# Patient Record
Sex: MALE | Race: WHITE | NOT HISPANIC OR LATINO | Employment: OTHER | URBAN - METROPOLITAN AREA
[De-identification: names, ages, dates, MRNs, and addresses within clinical notes are randomized per-mention and may not be internally consistent; named-entity substitution may affect disease eponyms.]

---

## 2017-08-17 DIAGNOSIS — E03.9 HYPOTHYROIDISM, UNSPECIFIED TYPE: ICD-10-CM

## 2017-08-17 NOTE — TELEPHONE ENCOUNTER
LEVOTHYROXINE     Last Written Prescription Date: 10/25/16  Last Quantity: 90, # refills: 3  Last Office Visit with G, P or [facility] prescribing provider: 10/25/16        TSH   Date Value Ref Range Status   11/11/2016 2.12 0.40 - 4.00 mU/L Final

## 2017-08-22 RX ORDER — LEVOTHYROXINE SODIUM 88 UG/1
88 TABLET ORAL DAILY
Qty: 90 TABLET | Refills: 0 | Status: SHIPPED | OUTPATIENT
Start: 2017-08-22 | End: 2017-11-15

## 2017-10-05 ENCOUNTER — HOSPITAL ENCOUNTER (OUTPATIENT)
Dept: GENERAL RADIOLOGY | Facility: CLINIC | Age: 52
Discharge: HOME | End: 2017-10-05
Attending: FAMILY MEDICINE | Admitting: FAMILY MEDICINE
Payer: COMMERCIAL

## 2017-10-05 ENCOUNTER — OFFICE VISIT (OUTPATIENT)
Dept: FAMILY MEDICINE | Facility: CLINIC | Age: 52
End: 2017-10-05
Payer: COMMERCIAL

## 2017-10-05 VITALS
OXYGEN SATURATION: 98 % | SYSTOLIC BLOOD PRESSURE: 128 MMHG | HEART RATE: 99 BPM | BODY MASS INDEX: 29.85 KG/M2 | DIASTOLIC BLOOD PRESSURE: 60 MMHG | WEIGHT: 218.6 LBS | TEMPERATURE: 96 F

## 2017-10-05 DIAGNOSIS — E03.9 HYPOTHYROIDISM, UNSPECIFIED TYPE: ICD-10-CM

## 2017-10-05 DIAGNOSIS — R07.1 CHEST PAIN ON BREATHING: ICD-10-CM

## 2017-10-05 DIAGNOSIS — J20.9 ACUTE BRONCHITIS, UNSPECIFIED ORGANISM: Primary | ICD-10-CM

## 2017-10-05 LAB
BASOPHILS # BLD AUTO: 0 10E9/L (ref 0–0.2)
BASOPHILS NFR BLD AUTO: 0.5 %
DIFFERENTIAL METHOD BLD: NORMAL
EOSINOPHIL # BLD AUTO: 0.2 10E9/L (ref 0–0.7)
EOSINOPHIL NFR BLD AUTO: 2.7 %
ERYTHROCYTE [DISTWIDTH] IN BLOOD BY AUTOMATED COUNT: 12.7 % (ref 10–15)
HCT VFR BLD AUTO: 49.5 % (ref 40–53)
HGB BLD-MCNC: 16.2 G/DL (ref 13.3–17.7)
IMM GRANULOCYTES # BLD: 0 10E9/L (ref 0–0.4)
IMM GRANULOCYTES NFR BLD: 0.5 %
LYMPHOCYTES # BLD AUTO: 2 10E9/L (ref 0.8–5.3)
LYMPHOCYTES NFR BLD AUTO: 26.5 %
MCH RBC QN AUTO: 31.3 PG (ref 26.5–33)
MCHC RBC AUTO-ENTMCNC: 32.7 G/DL (ref 31.5–36.5)
MCV RBC AUTO: 96 FL (ref 78–100)
MONOCYTES # BLD AUTO: 0.8 10E9/L (ref 0–1.3)
MONOCYTES NFR BLD AUTO: 11 %
NEUTROPHILS # BLD AUTO: 4.4 10E9/L (ref 1.6–8.3)
NEUTROPHILS NFR BLD AUTO: 58.8 %
PLATELET # BLD AUTO: 185 10E9/L (ref 150–450)
RBC # BLD AUTO: 5.17 10E12/L (ref 4.4–5.9)
TROPONIN I SERPL-MCNC: <0.015 UG/L (ref 0–0.04)
TSH SERPL DL<=0.005 MIU/L-ACNC: 1.2 MU/L (ref 0.4–4)
WBC # BLD AUTO: 7.5 10E9/L (ref 4–11)

## 2017-10-05 PROCEDURE — 84484 ASSAY OF TROPONIN QUANT: CPT | Performed by: FAMILY MEDICINE

## 2017-10-05 PROCEDURE — 71020 XR CHEST 2 VW: CPT | Mod: TC

## 2017-10-05 PROCEDURE — 36415 COLL VENOUS BLD VENIPUNCTURE: CPT | Performed by: FAMILY MEDICINE

## 2017-10-05 PROCEDURE — 84443 ASSAY THYROID STIM HORMONE: CPT | Performed by: FAMILY MEDICINE

## 2017-10-05 PROCEDURE — 85025 COMPLETE CBC W/AUTO DIFF WBC: CPT | Performed by: FAMILY MEDICINE

## 2017-10-05 PROCEDURE — 99214 OFFICE O/P EST MOD 30 MIN: CPT | Performed by: FAMILY MEDICINE

## 2017-10-05 RX ORDER — CODEINE PHOSPHATE AND GUAIFENESIN 10; 100 MG/5ML; MG/5ML
1-2 SOLUTION ORAL EVERY 6 HOURS PRN
Qty: 240 ML | Refills: 0 | Status: SHIPPED | OUTPATIENT
Start: 2017-10-05 | End: 2017-10-16

## 2017-10-05 NOTE — NURSING NOTE
"Chief Complaint   Patient presents with     Sinus Problem     Sinus congestion      Chest Pain     chest pain        Initial /60 (BP Location: Right arm, Patient Position: Chair, Cuff Size: Adult Large)  Pulse 99  Temp 96  F (35.6  C) (Tympanic)  Wt 218 lb 9.6 oz (99.2 kg)  SpO2 98%  BMI 29.85 kg/m2 Estimated body mass index is 29.85 kg/(m^2) as calculated from the following:    Height as of 10/25/16: 5' 11.75\" (1.822 m).    Weight as of this encounter: 218 lb 9.6 oz (99.2 kg).  Medication Reconciliation: complete    "

## 2017-10-05 NOTE — PROGRESS NOTES
"  SUBJECTIVE:   Dom Durham is a 52 year old male who presents to clinic today for the following health issues:  Patient c/o sinus pressure x 1 wk.     Patient believes that he may have had a heart attack on Saturday.  States that his heart felt \"fuzzy\" on Saturday. Sunday he was having chest pain.  Patient struck his own chest with his fists in order to stop the chest pain. Patient did not go to the ER.  He is now fatigued.     Acute Illness   Acute illness concerns: Sinus pressure   Onset: 1 wk     Fever: YES    Chills/Sweats: YES    Headache (location?): no     Sinus Pressure:YES    Conjunctivitis:  no    Ear Pain: Ears itch     Rhinorrhea: YES    Congestion: YES    Sore Throat: no      Cough: YES    Wheeze: YES, clears his throat     Decreased Appetite: YES    Nausea: no     Vomiting: no     Diarrhea:  YES    Dysuria/Freq.: no     Fatigue/Achiness: YES    Sick/Strep Exposure: no      Therapies Tried and outcome:             Problem list and histories reviewed & adjusted, as indicated.  Additional history: as documented        Reviewed and updated as needed this visit by clinical staff     Reviewed and updated as needed this visit by Provider        SUBJECTIVE:  Dom  is a 52 year old male who presents for:  One week of sinus pressure and congestion cough noticed some chest pain this weekend. Slightly short of breath. He's had fevers and chills. No history of chest problems or heart problems. Nonsmoker. Chest pain was not with activity. He's been fatigued and rundown as noted above.    Past Medical History:   Diagnosis Date     History of ankle fracture     5 times left, 2 times right     Hypertension      Thyroid dysfunction      Past Surgical History:   Procedure Laterality Date     ANKLE SURGERY  1984     COLONOSCOPY N/A 6/10/2016    Procedure: COLONOSCOPY;  Surgeon: Jarek Chaudhari MD;  Location:  GI     ESOPHAGOSCOPY, GASTROSCOPY, DUODENOSCOPY (EGD), COMBINED  5/24/2013    Procedure: " COMBINED ESOPHAGOSCOPY, GASTROSCOPY, DUODENOSCOPY (EGD), BIOPSY SINGLE OR MULTIPLE;;  Surgeon: Timothy Jennings MD;  Location: [facility]     LAPAROSCOPY DIAGNOSTIC (GENERAL)  8/14/2013    Procedure: LAPAROSCOPY DIAGNOSTIC (GENERAL);  Diagnostic Laparoscopy;  Surgeon: Stu Arias MD;  Location: [facility] OR     SHOULDER SURGERY       Social History   Substance Use Topics     Smoking status: Never Smoker     Smokeless tobacco: Never Used     Alcohol use 0.0 oz/week     0 Standard drinks or equivalent per week      Comment: everyday 5-6 vodka/ 6/10/16 states he isn't drinking     Current Outpatient Prescriptions   Medication Sig Dispense Refill     amoxicillin-clavulanate (AUGMENTIN) 875-125 MG per tablet Take 1 tablet by mouth 2 times daily 20 tablet 0     guaiFENesin-codeine (ROBITUSSIN AC) 100-10 MG/5ML SOLN solution Take 5-10 mLs by mouth every 6 hours as needed for cough 240 mL 0     levothyroxine (SYNTHROID/LEVOTHROID) 88 MCG tablet Take 1 tablet (88 mcg) by mouth daily Appointment needed for additional refills. 90 tablet 0       REVIEW OF SYSTEMS:   5 point ROS negative except as noted above in HPI, including Gen., Resp, CV, GI &  system review.     OBJECTIVE:  Vitals: /60 (BP Location: Right arm, Patient Position: Chair, Cuff Size: Adult Large)  Pulse 99  Temp 96  F (35.6  C) (Tympanic)  Wt 218 lb 9.6 oz (99.2 kg)  SpO2 98%  BMI 29.85 kg/m2  BMI= Body mass index is 29.85 kg/(m^2).  He appears in no distress. Throat with no drainage little reddened. Ears has some serous fluid. Neck supple no adenopathy. Lungs with bilateral wheezing and rhonchi. Frequent coughing with deep breathing. Heart with a regular rhythm S1-S2 no murmur. Extremities normal. Skin clear. Chest x-ray shows no infiltrates. WBC and differential is normal. Hemoglobin normal. Troponin was done and was normal.    ASSESSMENT:  #1 bronchitis #2 chest pain #3 hypothyroidism    PLAN:  I reassured him that I did not think this was cardiac in  origin. He has significant bronchial findings. Plus a negative troponin and the chest pain was not with activity. Will put him on Augmentin 875 twice a day some Robitussin with codeine. He is to follow-up if he developed further chest pain or not improving. He was due for thyroid testing and this was done.        Francisco Quintanilla MD  [facility]

## 2017-10-05 NOTE — MR AVS SNAPSHOT
After Visit Summary   10/5/2017    Dom Durham    MRN: [id number]           Patient Information     Date Of Birth          1965        Visit Information        Provider Department      10/5/2017 8:00 AM Francisco Quintanilla MD [facility]        Today's Diagnoses     Acute bronchitis, unspecified organism    -  1    Chest pain on breathing        Hypothyroidism, unspecified type           Follow-ups after your visit        Your next 10 appointments already scheduled     Oct 09, 2017  9:00 AM CDT   Office Visit with Ibrahima Casper MD   [facility]    [address] [id number] [phone]           Bring a current list of meds and any records pertaining to this visit. For Physicals, please bring immunization records and any forms needing to be filled out. Please arrive 10 minutes early to complete paperwork.            Oct 13, 2017  1:30 PM CDT   Nurse Only with NE FLU CLINIC   [facility] ([facility])    [address] [place] [phone] [phone]              Future tests that were ordered for you today     Open Future Orders        Priority Expected Expires Ordered    XR Chest 2 Views Routine 10/5/2017 10/5/2018 10/5/2017            Who to contact     If you have questions or need follow up information about today's clinic visit or your schedule please contact [facility] directly at [phone].  Normal or non-critical lab and imaging results will be communicated to you by MyChart, letter or phone within 4 business days after the clinic has received the results. If you do not hear from us within 7 days, please contact the clinic through MyChart or phone. If you have a critical or abnormal lab result, we will notify you by phone as soon as possible.  Submit refill requests through [employer] or call your pharmacy and they will forward  "the refill request to us. Please allow 3 business days for your refill to be completed.          Additional Information About Your Visit        Tristar[employer]Tracker Information     [employer] lets you send messages to your doctor, view your test results, renew your prescriptions, schedule appointments and more. To sign up, go to [email or web address] . Click on \"Log in\" on the left side of the screen, which will take you to the Welcome page. Then click on \"Sign up Now\" on the right side of the page.     You will be asked to enter the access code listed below, as well as some personal information. Please follow the directions to create your username and password.     Your access code is: [email or web address]  Expires: 1/3/2018  2:12 PM     Your access code will  in 90 days. If you need help or a new code, please call your [facility] clinic or [phone].        Care EveryWhere ID     This is your Care EveryWhere ID. This could be used by other organizations to access your [facility] medical records  [id number]        Your Vitals Were     Pulse Temperature Pulse Oximetry BMI (Body Mass Index)          99 96  F (35.6  C) (Tympanic) 98% 29.85 kg/m2         Blood Pressure from Last 3 Encounters:   10/05/17 128/60   10/25/16 130/90   06/10/16 119/83    Weight from Last 3 Encounters:   10/05/17 218 lb 9.6 oz (99.2 kg)   10/25/16 234 lb 12.8 oz (106.5 kg)   09/28/15 222 lb 12.8 oz (101.1 kg)              We Performed the Following     CBC with platelets differential     Troponin I     TSH with free T4 reflex          Today's Medication Changes          These changes are accurate as of: 10/5/17  2:12 PM.  If you have any questions, ask your nurse or doctor.               Start taking these medicines.        Dose/Directions    amoxicillin-clavulanate 875-125 MG per tablet   Commonly known as:  AUGMENTIN   Used for:  Acute bronchitis, unspecified organism   Started by:  Francisco Quintanilla MD        Dose:  1 tablet   Take 1 tablet by mouth 2 " times daily   Quantity:  20 tablet   Refills:  0       guaiFENesin-codeine 100-10 MG/5ML Soln solution   Commonly known as:  ROBITUSSIN AC   Used for:  Acute bronchitis, unspecified organism   Started by:  Francisco Quintanilla MD        Dose:  1-2 tsp.   Take 5-10 mLs by mouth every 6 hours as needed for cough   Quantity:  240 mL   Refills:  0            Where to get your medicines      [place] [facility] [place] [address]  [address] [place] [address]     Phone:  [phone]     amoxicillin-clavulanate 875-125 MG per tablet         Some of these will need a paper prescription and others can be bought over the counter.  Ask your nurse if you have questions.     Bring a paper prescription for each of these medications     guaiFENesin-codeine 100-10 MG/5ML Soln solution                Primary Care Provider Office Phone # Fax #    Ibrahima Casper [phone] [address]   [facility] [place] [address]        Equal Access to Services     [facility]: Hadii aad ku hadasho Soomaali, waaxda luqadaha, qaybta kaalmada adeegyada, waxay idiin hayaan becka dunham . So [facility] [place] [phone].    ATENCIÓN: Si habla español, tiene a wallis disposición servicios gratuitos de asistencia lingüística. Llame al [phone].    We comply with applicable federal civil rights laws and [place] laws. We do not discriminate on the basis of race, color, national origin, age, disability, sex, sexual orientation, or gender identity.            Thank you!     Thank you for choosing [facility]  for your care. Our goal is always to provide you with excellent care. Hearing back from our patients is one way we can continue to improve our services. Please take a few minutes to complete the written survey that you may receive in the mail after your visit with us. Thank you!             Your Updated Medication List - Protect others around you:  Learn how to safely use, store and throw away your medicines at www.disposemymeds.org.          This list is accurate as of: 10/5/17  2:12 PM.  Always use your most recent med list.                   Brand Name Dispense Instructions for use Diagnosis    amoxicillin-clavulanate 875-125 MG per tablet    AUGMENTIN    20 tablet    Take 1 tablet by mouth 2 times daily    Acute bronchitis, unspecified organism       guaiFENesin-codeine 100-10 MG/5ML Soln solution    ROBITUSSIN AC    240 mL    Take 5-10 mLs by mouth every 6 hours as needed for cough    Acute bronchitis, unspecified organism       levothyroxine 88 MCG tablet    SYNTHROID/LEVOTHROID    90 tablet    Take 1 tablet (88 mcg) by mouth daily Appointment needed for additional refills.    Hypothyroidism, unspecified type

## 2017-10-09 ENCOUNTER — TELEPHONE (OUTPATIENT)
Dept: FAMILY MEDICINE | Facility: CLINIC | Age: 52
End: 2017-10-09

## 2017-10-09 ENCOUNTER — OFFICE VISIT (OUTPATIENT)
Dept: FAMILY MEDICINE | Facility: CLINIC | Age: 52
End: 2017-10-09
Payer: COMMERCIAL

## 2017-10-09 ENCOUNTER — HOSPITAL ENCOUNTER (OUTPATIENT)
Dept: CT IMAGING | Facility: CLINIC | Age: 52
Discharge: HOME | End: 2017-10-09
Attending: FAMILY MEDICINE | Admitting: FAMILY MEDICINE
Payer: COMMERCIAL

## 2017-10-09 VITALS
HEART RATE: 86 BPM | HEIGHT: 71 IN | OXYGEN SATURATION: 100 % | BODY MASS INDEX: 31.22 KG/M2 | SYSTOLIC BLOOD PRESSURE: 126 MMHG | RESPIRATION RATE: 20 BRPM | TEMPERATURE: 98.3 F | DIASTOLIC BLOOD PRESSURE: 70 MMHG | WEIGHT: 223 LBS

## 2017-10-09 DIAGNOSIS — R05.9 COUGH: Primary | ICD-10-CM

## 2017-10-09 DIAGNOSIS — R05.9 COUGH: ICD-10-CM

## 2017-10-09 DIAGNOSIS — K76.0 STEATOSIS OF LIVER: ICD-10-CM

## 2017-10-09 DIAGNOSIS — R06.02 SHORTNESS OF BREATH: ICD-10-CM

## 2017-10-09 DIAGNOSIS — R53.83 FATIGUE, UNSPECIFIED TYPE: ICD-10-CM

## 2017-10-09 LAB
ALBUMIN SERPL-MCNC: 4.3 G/DL (ref 3.4–5)
ALP SERPL-CCNC: 80 U/L (ref 40–150)
ALT SERPL W P-5'-P-CCNC: 60 U/L (ref 0–70)
ANION GAP SERPL CALCULATED.3IONS-SCNC: 8 MMOL/L (ref 3–14)
AST SERPL W P-5'-P-CCNC: 44 U/L (ref 0–45)
BILIRUB SERPL-MCNC: 0.4 MG/DL (ref 0.2–1.3)
BUN SERPL-MCNC: 11 MG/DL (ref 7–30)
CALCIUM SERPL-MCNC: 9.5 MG/DL (ref 8.5–10.1)
CHLORIDE SERPL-SCNC: 105 MMOL/L (ref 94–109)
CO2 SERPL-SCNC: 26 MMOL/L (ref 20–32)
CREAT SERPL-MCNC: 0.85 MG/DL (ref 0.66–1.25)
FERRITIN SERPL-MCNC: 375 NG/ML (ref 26–388)
GFR SERPL CREATININE-BSD FRML MDRD: >90 ML/MIN/1.7M2
GLUCOSE SERPL-MCNC: 90 MG/DL (ref 70–99)
HETEROPH AB SER QL: NEGATIVE
IRON SATN MFR SERPL: 42 % (ref 15–46)
IRON SERPL-MCNC: 122 UG/DL (ref 35–180)
POTASSIUM SERPL-SCNC: 4 MMOL/L (ref 3.4–5.3)
PROT SERPL-MCNC: 7.8 G/DL (ref 6.8–8.8)
SODIUM SERPL-SCNC: 139 MMOL/L (ref 133–144)
TIBC SERPL-MCNC: 288 UG/DL (ref 240–430)

## 2017-10-09 PROCEDURE — 82728 ASSAY OF FERRITIN: CPT | Performed by: FAMILY MEDICINE

## 2017-10-09 PROCEDURE — 99214 OFFICE O/P EST MOD 30 MIN: CPT | Performed by: FAMILY MEDICINE

## 2017-10-09 PROCEDURE — 86709 HEPATITIS A IGM ANTIBODY: CPT | Performed by: FAMILY MEDICINE

## 2017-10-09 PROCEDURE — 83550 IRON BINDING TEST: CPT | Performed by: FAMILY MEDICINE

## 2017-10-09 PROCEDURE — 87340 HEPATITIS B SURFACE AG IA: CPT | Performed by: FAMILY MEDICINE

## 2017-10-09 PROCEDURE — 71260 CT THORAX DX C+: CPT

## 2017-10-09 PROCEDURE — 83540 ASSAY OF IRON: CPT | Performed by: FAMILY MEDICINE

## 2017-10-09 PROCEDURE — 36415 COLL VENOUS BLD VENIPUNCTURE: CPT | Performed by: FAMILY MEDICINE

## 2017-10-09 PROCEDURE — 25000125 ZZHC RX 250: Performed by: RADIOLOGY

## 2017-10-09 PROCEDURE — 81332 SERPINA1 GENE: CPT | Mod: 90 | Performed by: FAMILY MEDICINE

## 2017-10-09 PROCEDURE — 86708 HEPATITIS A ANTIBODY: CPT | Performed by: FAMILY MEDICINE

## 2017-10-09 PROCEDURE — 86645 CMV ANTIBODY IGM: CPT | Performed by: FAMILY MEDICINE

## 2017-10-09 PROCEDURE — 82103 ALPHA-1-ANTITRYPSIN TOTAL: CPT | Mod: 90 | Performed by: FAMILY MEDICINE

## 2017-10-09 PROCEDURE — 80053 COMPREHEN METABOLIC PANEL: CPT | Performed by: FAMILY MEDICINE

## 2017-10-09 PROCEDURE — 86308 HETEROPHILE ANTIBODY SCREEN: CPT | Performed by: FAMILY MEDICINE

## 2017-10-09 PROCEDURE — 99000 SPECIMEN HANDLING OFFICE-LAB: CPT | Performed by: FAMILY MEDICINE

## 2017-10-09 PROCEDURE — 86704 HEP B CORE ANTIBODY TOTAL: CPT | Performed by: FAMILY MEDICINE

## 2017-10-09 PROCEDURE — 86644 CMV ANTIBODY: CPT | Performed by: FAMILY MEDICINE

## 2017-10-09 PROCEDURE — 86706 HEP B SURFACE ANTIBODY: CPT | Performed by: FAMILY MEDICINE

## 2017-10-09 PROCEDURE — 25000128 H RX IP 250 OP 636: Performed by: RADIOLOGY

## 2017-10-09 RX ORDER — IOPAMIDOL 755 MG/ML
500 INJECTION, SOLUTION INTRAVASCULAR ONCE
Status: COMPLETED | OUTPATIENT
Start: 2017-10-09 | End: 2017-10-09

## 2017-10-09 RX ADMIN — IOPAMIDOL 80 ML: 755 INJECTION, SOLUTION INTRAVENOUS at 11:37

## 2017-10-09 RX ADMIN — SODIUM CHLORIDE 70 ML: 9 INJECTION, SOLUTION INTRAVENOUS at 11:37

## 2017-10-09 ASSESSMENT — PAIN SCALES - GENERAL: PAINLEVEL: NO PAIN (0)

## 2017-10-09 NOTE — MR AVS SNAPSHOT
After Visit Summary   10/9/2017    Dom Durham    MRN: [id number]           Patient Information     Date Of Birth          1965        Visit Information        Provider Department      10/9/2017 9:00 AM Ibrahima Casper MD [facility]        Today's Diagnoses     Cough    -  1    Shortness of breath        Steatosis of liver        Fatigue, unspecified type           Follow-ups after your visit        Your next 10 appointments already scheduled     Oct 12, 2017  8:10 AM CDT   US ABDOMEN COMPLETE with PHUS1   [facility] ([facility])    [address] [id number] [phone]           Please bring a list of your medicines (including vitamins, minerals and over-the-counter drugs). Also, tell your doctor about any allergies you may have. Wear comfortable clothes and leave your valuables at home.  Adults: No eating or drinking for 8 hours before the exam. You may take medicine with a small sip of water.  Children: - Children 6+ years: No food or drink for 6 hours before exam. - Children 1-5 years: No food or drink for 4 hours before exam. - Infants, breast-fed: may have breast milk up to 2 hours before exam. - Infants, formula: may have bottle until 4 hours before exam.  Please call the Imaging Department at your exam site with any questions.            Oct 13, 2017  1:30 PM CDT   Nurse Only with NE FLU CLINIC   [facility]    [address] [place] [id number] [phone]              Future tests that were ordered for you today     Open Future Orders        Priority Expected Expires Ordered    US Abdomen Complete Routine  10/9/2018 10/9/2017    CT Chest Pulmonary Embolism w Contrast Routine  10/9/2018 10/9/2017            Who to contact     If you have questions or need follow up information about today's clinic visit or your schedule please contact  "[facility] directly at [phone].  Normal or non-critical lab and imaging results will be communicated to you by MyChart, letter or phone within 4 business days after the clinic has received the results. If you do not hear from us within 7 days, please contact the clinic through [employer] or phone. If you have a critical or abnormal lab result, we will notify you by phone as soon as possible.  Submit refill requests through [employer] or call your pharmacy and they will forward the refill request to us. Please allow 3 business days for your refill to be completed.          Additional Information About Your Visit        apiOmatharKony Information     [employer] lets you send messages to your doctor, view your test results, renew your prescriptions, schedule appointments and more. To sign up, go to [email or web address] . Click on \"Log in\" on the left side of the screen, which will take you to the Welcome page. Then click on \"Sign up Now\" on the right side of the page.     You will be asked to enter the access code listed below, as well as some personal information. Please follow the directions to create your username and password.     Your access code is: [email or web address]  Expires: 1/3/2018  2:12 PM     Your access code will  in 90 days. If you need help or a new code, please call your [place] clinic or [phone].        Care EveryWhere ID     This is your Care EveryWhere ID. This could be used by other organizations to access your [facility] medical records  [id number]        Your Vitals Were     Pulse Temperature Respirations Height Pulse Oximetry BMI (Body Mass Index)    86 98.3  F (36.8  C) (Temporal) 20 5' 11\" (1.803 m) 100% 31.1 kg/m2       Blood Pressure from Last 3 Encounters:   10/09/17 126/70   10/05/17 128/60   10/25/16 130/90    Weight from Last 3 Encounters:   10/09/17 223 lb (101.2 kg)   10/05/17 218 lb 9.6 oz (99.2 kg)   10/25/16 234 lb 12.8 oz (106.5 kg)              We Performed the " Following     Alpha 1 antitryp mohinder reflex to pheno     CMV Antibody IgG     CMV antibody IgM     Comprehensive metabolic panel     Ferritin     Hepatitis A antibody IgM     Hepatitis Antibody A IgG     Hepatitis B core antibody     Hepatitis B Surface Antibody     Hepatitis B surface antigen     Iron and iron binding capacity     Mononucleosis screen        Primary Care Provider Office Phone # Fax #    Ibrahima Praful Casper [phone] [address] [place] [address]        Equal Access to Services     BRANDON REZA : Hadii aad ku hadasho Soomaali, waaxda luqadaha, qaybta kaalmada adeegyada, waxay idiin hayaan adeeg kharash la'aan ah. So [facility] [phone].    ATENCIÓN: Si pino stark, tiene a wallis disposición servicios gratuitos de asistencia lingüística. Llame al [phone].    We comply with applicable federal civil rights laws and [place] laws. We do not discriminate on the basis of race, color, national origin, age, disability, sex, sexual orientation, or gender identity.            Thank you!     Thank you for choosing [facility]  for your care. Our goal is always to provide you with excellent care. Hearing back from our patients is one way we can continue to improve our services. Please take a few minutes to complete the written survey that you may receive in the mail after your visit with us. Thank you!             Your Updated Medication List - Protect others around you: Learn how to safely use, store and throw away your medicines at www.disposemymeds.org.          This list is accurate as of: 10/9/17  4:51 PM.  Always use your most recent med list.                   Brand Name Dispense Instructions for use Diagnosis    amoxicillin-clavulanate 875-125 MG per tablet    AUGMENTIN    20 tablet    Take 1 tablet by mouth 2 times daily    Acute bronchitis, unspecified organism       guaiFENesin-codeine 100-10 MG/5ML Soln solution    ROBITUSSIN AC    240 mL    Take  5-10 mLs by mouth every 6 hours as needed for cough    Acute bronchitis, unspecified organism       levothyroxine 88 MCG tablet    SYNTHROID/LEVOTHROID    90 tablet    Take 1 tablet (88 mcg) by mouth daily Appointment needed for additional refills.    Hypothyroidism, unspecified type

## 2017-10-09 NOTE — PROGRESS NOTES
"  SUBJECTIVE:   Dom Durham is a 52 year old male who presents to clinic today for the following health issues:      Recheck, bronchitis, Needs note for work.     Problem list and histories reviewed & adjusted, as indicated.  Additional history: as documented    Reviewed and updated as needed this visit by clinical staff  Tobacco  Allergies  Meds  Problems  Med Hx  Surg Hx  Fam Hx  Soc Hx        Reviewed and updated as needed this visit by Provider  Allergies  Meds  Problems       Still not feeling well.  Very fatigued and weak.  dyspnea on exertion without chest pain or pressure.  Gets sweaty when he exerts himself.  Coughs a lot with this.  Feels like it is in his lungs.  His sinuses are clears.  Illness started on 9/28, 11 days ago.      Saw Dr. Quintanilla 4 days ago.  Got Augmentin but it has not helped him much at all.  Has been sleeping a lot due to the fatigue.    Having chills and night sweats.      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      Patient drinks about 4-5 mixed drinks/day, has not been drinking any since illness started 11 days ago.  , monogamous relationship.    TSH done at last visit and was within normal limits.      ROS:  10 point ROS of systems including Constitutional, Eyes, Respiratory, Cardiovascular, Gastroenterology, Genitourinary, Integumentary, Muscularskeletal, Psychiatric were all negative except for pertinent positives noted in my HPI.     OBJECTIVE:                                                    /70  Pulse 86  Temp 98.3  F (36.8  C) (Temporal)  Resp 20  Ht 5' 11\" (1.803 m)  Wt 223 lb (101.2 kg)  SpO2 100%  BMI 31.1 kg/m2  Body mass index is 31.1 kg/(m^2).  GENERAL APPEARANCE: obese, alert, no distress and fatigued  RESP: lungs clear to auscultation - no rales, rhonchi or wheezes  CV: regular rates " and rhythm, normal S1 S2, no S3 or S4 and no murmur, click or rub  ABDOMEN: abdominal obesity, soft, without hepatosplenomegaly or masses and bowel sounds normal    CT of chest:  Impression:   1. No evidence of pulmonary embolus or other findings to suggest an  etiology of the patient's symptoms.  2. Hepatic steatosis.  3. Minimal gynecomastia.  4. Sequela of prior granulomatous disease in the chest and spleen.          ASSESSMENT/PLAN:                                                        ICD-10-CM    1. Cough R05 Alpha 1 antitryp mohinder reflex to pheno     Mononucleosis screen     CMV Antibody IgG     CMV antibody IgM     CANCELED: CT Chest w Contrast   2. Shortness of breath R06.02 Alpha 1 antitryp mohinder reflex to pheno     Mononucleosis screen     CMV Antibody IgG     CMV antibody IgM     CANCELED: CT Chest w Contrast   3. Steatosis of liver K76.0 Alpha 1 antitryp mohinder reflex to pheno     Hepatitis B surface antigen     Hepatitis Antibody A IgG     Hepatitis B Surface Antibody     Hepatitis B core antibody     Hepatitis A antibody IgM     Comprehensive metabolic panel     Ferritin     Iron and iron binding capacity     Mononucleosis screen     CMV Antibody IgG     CMV antibody IgM     CANCELED: Hepatitis C Screen Reflex to HCV RNA Quant and Genotype   4. Fatigue, unspecified type R53.83 Mononucleosis screen     CMV Antibody IgG     CMV antibody IgM     PLAN:  1.  Patient has new onset fever, chills sweats and dyspnea on exertion that started suddenly 11 days ago.  Previously seen 4 days ago and had normal troponin and TSH and was given course of Augmentin without improvement and perhaps has started to feel worse.    2.  CT scan of chest negative for lung/chest causes but did note steatosis of the liver.  Above labs and ultrasound of right upper quadrant ordered to investigate this finding further as well as other potential causes of fatigue.  We did discuss possible causes of his steatosis including his heavy  alcohol use.      Follow up with Provider - we will contact patient once all lab work and ultrasound have been completed to discuss next steps in treatment and/or workup.     Ibrahima Casper MD   [facility]

## 2017-10-09 NOTE — NURSING NOTE
"Chief Complaint   Patient presents with     URI     seen [facility] on 10-5 still not feeling well to go back to work.  He states he does not to go back to work yet.        Initial /70  Pulse 86  Temp 98.3  F (36.8  C) (Temporal)  Resp 20  Ht 5' 11\" (1.803 m)  Wt 223 lb (101.2 kg)  SpO2 100%  BMI 31.1 kg/m2 Estimated body mass index is 31.1 kg/(m^2) as calculated from the following:    Height as of this encounter: 5' 11\" (1.803 m).    Weight as of this encounter: 223 lb (101.2 kg).  Medication Reconciliation: complete    "

## 2017-10-09 NOTE — TELEPHONE ENCOUNTER
Reason for Call:  Other call back    Detailed comments: Pt is calling back stating his employment did not get the letter. Can you please refax it? The fax # was on the bottom of the paper. Please call pt when this has been faxed.     Phone Number Patient can be reached at: Home number on file [phone] (home)    Best Time: anytime    Can we leave a detailed message on this number? YES    Call taken on 10/9/2017 at 2:13 PM by Lucie Slaughter

## 2017-10-10 LAB
CMV IGG SERPL QL IA: 0.2 AI (ref 0–0.8)
CMV IGM SERPL QL IA: <0.2 AI (ref 0–0.8)
HAV IGG SER QL IA: NONREACTIVE
HAV IGM SERPL QL IA: NONREACTIVE
HBV CORE AB SERPL QL IA: NONREACTIVE
HBV SURFACE AB SERPL IA-ACNC: 0.5 M[IU]/ML
HBV SURFACE AG SERPL QL IA: NONREACTIVE

## 2017-10-10 NOTE — TELEPHONE ENCOUNTER
Pt is calling back stating the note was faxed yesterday needs to have a diagnosis. His employment is faxing the note back to you needing a diagnosis. The first note noted bronchitis and they were ok with that, but now yesterday there's no diagnosis again. Please call pt back and advise.

## 2017-10-12 ENCOUNTER — HOSPITAL ENCOUNTER (OUTPATIENT)
Dept: ULTRASOUND IMAGING | Facility: CLINIC | Age: 52
Discharge: HOME | End: 2017-10-12
Attending: FAMILY MEDICINE | Admitting: FAMILY MEDICINE
Payer: COMMERCIAL

## 2017-10-12 DIAGNOSIS — K76.0 STEATOSIS OF LIVER: ICD-10-CM

## 2017-10-12 LAB
A1AT PHENOTYP SERPL-IMP: NORMAL
A1AT SERPL-MCNC: NORMAL MG/DL
A1AT SS SERPL-MCNC: NORMAL G/L
A1AT SZ SERPL-MCNC: NORMAL G/L
A1AT ZZ SERPL-MCNC: NORMAL G/L
SPECIMEN SOURCE: NORMAL

## 2017-10-12 PROCEDURE — 76700 US EXAM ABDOM COMPLETE: CPT

## 2017-10-13 NOTE — PROGRESS NOTES
Please notify patient of results.  We are just waiting for his alpha-1 antitrypsin antibody to come back.  All other labs normal.      Ibrahima Casper MD

## 2017-10-13 NOTE — PROGRESS NOTES
Please notify patient of results.  Ultrasound shows only fatty infiltration of liver.  Please find out how he is feeling.  If not doing well, set up follow-up appointment on Monday.    Ibrahima Casper MD

## 2017-10-16 ENCOUNTER — OFFICE VISIT (OUTPATIENT)
Dept: FAMILY MEDICINE | Facility: CLINIC | Age: 52
End: 2017-10-16
Payer: COMMERCIAL

## 2017-10-16 VITALS
RESPIRATION RATE: 18 BRPM | HEART RATE: 80 BPM | WEIGHT: 217 LBS | TEMPERATURE: 98.4 F | OXYGEN SATURATION: 99 % | DIASTOLIC BLOOD PRESSURE: 72 MMHG | BODY MASS INDEX: 30.38 KG/M2 | HEIGHT: 71 IN | SYSTOLIC BLOOD PRESSURE: 128 MMHG

## 2017-10-16 DIAGNOSIS — T50.905A ADVERSE EFFECT OF DRUG, INITIAL ENCOUNTER: Primary | ICD-10-CM

## 2017-10-16 PROCEDURE — 99213 OFFICE O/P EST LOW 20 MIN: CPT | Performed by: FAMILY MEDICINE

## 2017-10-16 ASSESSMENT — PAIN SCALES - GENERAL: PAINLEVEL: NO PAIN (0)

## 2017-10-16 NOTE — MR AVS SNAPSHOT
"              After Visit Summary   10/16/2017    Dom Durham    MRN: [id number]           Patient Information     Date Of Birth          1965        Visit Information        Provider Department      10/16/2017 2:00 PM Ibrahima Casper MD [facility]        Today's Diagnoses     Adverse effect of drug, initial encounter    -  1       Follow-ups after your visit        Who to contact     If you have questions or need follow up information about today's clinic visit or your schedule please contact [facility] directly at [phone].  Normal or non-critical lab and imaging results will be communicated to you by TaDawebhart, letter or phone within 4 business days after the clinic has received the results. If you do not hear from us within 7 days, please contact the clinic through [employer] or phone. If you have a critical or abnormal lab result, we will notify you by phone as soon as possible.  Submit refill requests through InboundWriter or call your pharmacy and they will forward the refill request to us. Please allow 3 business days for your refill to be completed.          Additional Information About Your Visit        MyChart Information     InboundWriter lets you send messages to your doctor, view your test results, renew your prescriptions, schedule appointments and more. To sign up, go to [email or web address] . Click on \"Log in\" on the left side of the screen, which will take you to the Welcome page. Then click on \"Sign up Now\" on the right side of the page.     You will be asked to enter the access code listed below, as well as some personal information. Please follow the directions to create your username and password.     Your access code is: [email or web address]  Expires: 1/3/2018  2:12 PM     Your access code will  in 90 days. If you need help or a new code, please call your [facility] or [phone].        Care EveryWhere ID     This is your Care EveryWhere ID. This " "could be used by other organizations to access your [facility] medical records  [id number]        Your Vitals Were     Pulse Temperature Respirations Height Pulse Oximetry BMI (Body Mass Index)    80 98.4  F (36.9  C) (Temporal) 18 5' 11\" (1.803 m) 99% 30.27 kg/m2       Blood Pressure from Last 3 Encounters:   10/16/17 128/72   10/09/17 126/70   10/05/17 128/60    Weight from Last 3 Encounters:   10/16/17 217 lb (98.4 kg)   10/09/17 223 lb (101.2 kg)   10/05/17 218 lb 9.6 oz (99.2 kg)              Today, you had the following     No orders found for display         Today's Medication Changes          These changes are accurate as of: 10/16/17  5:06 PM.  If you have any questions, ask your nurse or doctor.               Stop taking these medicines if you haven't already. Please contact your care team if you have questions.     amoxicillin-clavulanate 875-125 MG per tablet   Commonly known as:  AUGMENTIN   Stopped by:  Ibrahima Casper MD           guaiFENesin-codeine 100-10 MG/5ML Soln solution   Commonly known as:  ROBITUSSIN AC   Stopped by:  Ibrahima Casper MD                    Primary Care Provider Office Phone # Fax #    Ibrahima Casper [phone] [address] [place] [address]        Equal Access to Services     [facility] AH: Hadii aad ku hadasho Soomaali, waaxda luqadaha, qaybta kaalmada adeegyada, omega mesa. So [place] [phone].    ATENCIÓN: Si habla español, tiene a wallis disposición servicios gratuitos de asistencia lingüística. Llame al [phone].    We comply with applicable federal civil rights laws and [place] laws. We do not discriminate on the basis of race, color, national origin, age, disability, sex, sexual orientation, or gender identity.            Thank you!     Thank you for choosing [facility]  for your care. Our goal is always to provide you with excellent care. Hearing back from our patients " is one way we can continue to improve our services. Please take a few minutes to complete the written survey that you may receive in the mail after your visit with us. Thank you!             Your Updated Medication List - Protect others around you: Learn how to safely use, store and throw away your medicines at www.disposemymeds.org.          This list is accurate as of: 10/16/17  5:06 PM.  Always use your most recent med list.                   Brand Name Dispense Instructions for use Diagnosis    levothyroxine 88 MCG tablet    SYNTHROID/LEVOTHROID    90 tablet    Take 1 tablet (88 mcg) by mouth daily Appointment needed for additional refills.    Hypothyroidism, unspecified type

## 2017-10-16 NOTE — PROGRESS NOTES
"  SUBJECTIVE:   Dom Durham is a 52 year old male who presents to clinic today for the following health issues:      Med reaction to the Augmentin.  He is better since Friday.      Problem list and histories reviewed & adjusted, as indicated.  Additional history: as documented    Reviewed and updated as needed this visit by clinical staff  Tobacco  Allergies  Meds  Problems  Med Hx  Surg Hx  Fam Hx  Soc Hx        Reviewed and updated as needed this visit by Provider  Allergies  Meds  Problems         Patient here to review labs and ultrasound done for workup of his abdominal pain and shortness of breath.  He notes that he is actually feeling better after he stopped Augmentin prescription that was given to him for bronchitis.  He stopped this medication 3 days ago and he has gradually improved.  All of his labs have come back within normal limits with except of alpha-1-antitripsyn which is still pending.  His shortness of breath has improved.      ultrasound of liver confirmed fatty liver, but no other abnormalities.      ROS:  10 point ROS of systems including Constitutional, Eyes, Respiratory, Cardiovascular, Gastroenterology, Genitourinary, Integumentary, Muscularskeletal, Psychiatric were all negative except for pertinent positives noted in my HPI.     OBJECTIVE:                                                    /72  Pulse 80  Temp 98.4  F (36.9  C) (Temporal)  Resp 18  Ht 5' 11\" (1.803 m)  Wt 217 lb (98.4 kg)  SpO2 99%  BMI 30.27 kg/m2  Body mass index is 30.27 kg/(m^2).  GENERAL APPEARANCE: healthy, alert and no distress  RESP: lungs clear to auscultation - no rales, rhonchi or wheezes  CV: regular rates and rhythm, normal S1 S2, no S3 or S4 and no murmur, click or rub  ABDOMEN: soft, nontender, without hepatosplenomegaly or masses and bowel sounds normal          ASSESSMENT/PLAN:                                                        ICD-10-CM    1. Adverse effect of drug, " initial encounter T88.7XXA      PLAN:  1.  Patient's symptoms seem to have resolved with cessation of Augmentin.  He will continue to monitor his symptoms and follow-up as needed if they do not continue to improve.  He was reassured by all of his normal labs and ultrasound    Ibrahima Casper MD   [facility]

## 2017-10-16 NOTE — LETTER
October 16, 2017    To Whom it may Concern:    Dom Durham was seen today in clinic.  He is improving and I feel that he should be able to return to work on Wednesday, October 18th.  At that point I recommend a trial of full duty and he will follow-up with me only if this does not work out for him.    If you have any further questions, please contact me at the number above.    Sincerely,        Ibrahima Casper MD

## 2017-10-16 NOTE — NURSING NOTE
"Chief Complaint   Patient presents with     Abdominal Pain     stopped the Augmentin  now feels better.         Initial /72  Pulse 80  Temp 98.4  F (36.9  C) (Temporal)  Resp 18  Ht 5' 11\" (1.803 m)  Wt 217 lb (98.4 kg)  SpO2 99%  BMI 30.27 kg/m2 Estimated body mass index is 30.27 kg/(m^2) as calculated from the following:    Height as of this encounter: 5' 11\" (1.803 m).    Weight as of this encounter: 217 lb (98.4 kg).  Medication Reconciliation: complete    "

## 2017-10-17 LAB — LAB SCANNED RESULT: NORMAL

## 2017-10-19 ENCOUNTER — TELEPHONE (OUTPATIENT)
Dept: FAMILY MEDICINE | Facility: CLINIC | Age: 52
End: 2017-10-19

## 2017-10-19 NOTE — TELEPHONE ENCOUNTER
----- Message from Ibrahima Casper MD sent at 10/19/2017  8:49 AM CDT -----  Please notify patient of results.  His alpha 1 antitrypsin testing came back normal.  I hope he is continuing to feel better.      Ibrahima Casper MD

## 2017-10-19 NOTE — TELEPHONE ENCOUNTER
Patient returned call, message per Dr Casper was relayed to patient, patient states he is definitely feeling better, no further questions.  Thank you,  Alejandra Boyce  Patient Representative

## 2017-10-19 NOTE — PROGRESS NOTES
Please notify patient of results.  His alpha 1 antitrypsin testing came back normal.  I hope he is continuing to feel better.      Ibrahima Casper MD

## 2017-11-15 DIAGNOSIS — E03.9 HYPOTHYROIDISM, UNSPECIFIED TYPE: ICD-10-CM

## 2017-11-21 RX ORDER — LEVOTHYROXINE SODIUM 88 UG/1
88 TABLET ORAL DAILY
Qty: 90 TABLET | Refills: 0 | Status: SHIPPED | OUTPATIENT
Start: 2017-11-21 | End: 2018-02-16

## 2017-11-21 NOTE — TELEPHONE ENCOUNTER
Requested Prescriptions   Pending Prescriptions Disp Refills     levothyroxine (SYNTHROID/LEVOTHROID) 88 MCG tablet [Pharmacy Med Name: LEVOTHYROXINE 88 MCG TABLET 88 TAB] 90 tablet 0     Sig: TAKE 1 TABLET (88 MCG) BY MOUTH DAILY APPOINTMENT NEEDED FOR ADDITIONAL REFILLS    Thyroid Protocol Passed    11/20/2017  4:02 PM       Passed - Patient is 12 years or older       Passed - Recent or future visit with authorizing provider's specialty    Patient had office visit in the last year or has a visit in the next 30 days with authorizing provider.  See chart review.              Passed - Normal TSH on file in past 12 months    Recent Labs   Lab Test  10/05/17   0829   TSH  1.20

## 2017-11-21 NOTE — TELEPHONE ENCOUNTER
Prescription approved per [facility] Refill Protocol.    Cheri Nieto RN  [facility]

## 2018-02-16 DIAGNOSIS — E03.9 HYPOTHYROIDISM, UNSPECIFIED TYPE: ICD-10-CM

## 2018-02-16 NOTE — TELEPHONE ENCOUNTER
Last Written Prescription Date:  11/21/17  Last Fill Quantity: 90,  # refills: 0   Last office visit: 10/16/2017 with prescribing provider:  Tremayne   Future Office Visit:

## 2018-02-20 RX ORDER — LEVOTHYROXINE SODIUM 88 UG/1
TABLET ORAL
Qty: 90 TABLET | Refills: 1 | Status: SHIPPED | OUTPATIENT
Start: 2018-02-20 | End: 2018-08-10

## 2018-02-20 NOTE — TELEPHONE ENCOUNTER
"Requested Prescriptions   Pending Prescriptions Disp Refills     levothyroxine (SYNTHROID/LEVOTHROID) 88 MCG tablet [Pharmacy Med Name: LEVOTHYROXINE 88 MCG TABLET 88 TAB] 90 tablet 0     Sig: TAKE ONE TABLET BY MOUTH ONCE DAILY    Thyroid Protocol Passed    2/16/2018  9:54 AM       Passed - Patient is 12 years or older       Passed - Recent or future visit with authorizing provider's specialty    Patient had office visit in the last year or has a visit in the next 30 days with authorizing provider.  See \"Patient Info\" tab in inbasket, or \"Choose Columns\" in Meds & Orders section of the refill encounter.            Passed - Normal TSH on file in past 12 months    Recent Labs   Lab Test  10/05/17   0829   TSH  1.20              Rx refilled per RN protocol.  Lona Anna, MARICRUZN, RN    "

## 2018-08-10 DIAGNOSIS — E03.9 HYPOTHYROIDISM, UNSPECIFIED TYPE: ICD-10-CM

## 2018-08-10 RX ORDER — LEVOTHYROXINE SODIUM 88 UG/1
88 TABLET ORAL DAILY
Qty: 90 TABLET | Refills: 0 | Status: SHIPPED | OUTPATIENT
Start: 2018-08-10 | End: 2018-11-26

## 2018-08-10 NOTE — TELEPHONE ENCOUNTER
Prescription approved per [facility] Refill Protocol.    Ellen Claudio RN

## 2018-08-10 NOTE — TELEPHONE ENCOUNTER
"Requested Prescriptions   Pending Prescriptions Disp Refills     levothyroxine (SYNTHROID/LEVOTHROID) 88 MCG tablet [Pharmacy Med Name: LEVOTHYROXINE 88 MCG TABLET 88 TAB] 90 tablet 1     Sig: TAKE ONE TABLET BY MOUTH ONCE DAILY    Thyroid Protocol Passed    8/10/2018 11:10 AM       Passed - Patient is 12 years or older       Passed - Recent (12 mo) or future (30 days) visit within the authorizing provider's specialty    Patient had office visit in the last 12 months or has a visit in the next 30 days with authorizing provider or within the authorizing provider's specialty.  See \"Patient Info\" tab in inbasket, or \"Choose Columns\" in Meds & Orders section of the refill encounter.           Passed - Normal TSH on file in past 12 months    Recent Labs   Lab Test  10/05/17   0829   TSH  1.20              Last Written Prescription Date:  2/20/2018  Last Fill Quantity: 90,  # refills: 1   Last office visit: 10/16/2017 with prescribing provider:  10/16/2017   Future Office Visit:      "

## 2018-11-26 DIAGNOSIS — E03.9 HYPOTHYROIDISM, UNSPECIFIED TYPE: ICD-10-CM

## 2018-11-26 NOTE — LETTER
November 30, 2018      Dom Durham  [address]  [place] [address]        Dear Dom,     We have been trying to reach you, you are due to be seen to recheck your thyroid. Please call and make an appt.       Sincerely,        Ibrahima Casper MD

## 2018-11-28 RX ORDER — LEVOTHYROXINE SODIUM 88 UG/1
88 TABLET ORAL DAILY
Qty: 30 TABLET | Refills: 0 | Status: SHIPPED | OUTPATIENT
Start: 2018-11-28 | End: 2018-12-14

## 2018-11-28 NOTE — TELEPHONE ENCOUNTER
"Requested Prescriptions   Pending Prescriptions Disp Refills     levothyroxine (SYNTHROID/LEVOTHROID) 88 MCG tablet [Pharmacy Med Name: LEVOTHYROXINE 88MCG TAB 88 TAB] 90 tablet 0     Sig: TAKE 1 TABLET (88 MCG) BY MOUTH DAILY    Thyroid Protocol Failed    11/26/2018 12:10 PM       Failed - Recent (12 mo) or future (30 days) visit within the authorizing provider's specialty    Patient had office visit in the last 12 months or has a visit in the next 30 days with authorizing provider or within the authorizing provider's specialty.  See \"Patient Info\" tab in inbasket, or \"Choose Columns\" in Meds & Orders section of the refill encounter.             Failed - Normal TSH on file in past 12 months    Recent Labs   Lab Test  10/05/17   0829   TSH  1.20             Passed - Patient is 12 years or older        Last Written Prescription Date:  8/10/18  Last Fill Quantity: 90,  # refills: 0   Last office visit: 10/16/2017 with prescribing provider:  Has not been seen for Hypothyroidism since 2016.   Future Office Visit:      Rx refilled per RN protocol.  1 month    Will forward to schedulers to schedule patient for OV.  Lona Anna RN      "

## 2018-11-28 NOTE — TELEPHONE ENCOUNTER
Tried to call pt- no answer & no voicemail to LM. Will try again later.  Thank you,  Sima Goodson- Pt Rep.

## 2018-11-29 NOTE — TELEPHONE ENCOUNTER
2nd attempt to reach pt- still no answer and unable to LM. Routing back to team to send letter.  Thank you,  Sima Goodson- Pt Rep.

## 2018-11-30 NOTE — TELEPHONE ENCOUNTER
Pt called. Informed pt of message below. Pt stated he will call back to [facility] an appt.

## 2018-12-14 ENCOUNTER — OFFICE VISIT (OUTPATIENT)
Dept: FAMILY MEDICINE | Facility: CLINIC | Age: 53
End: 2018-12-14
Payer: COMMERCIAL

## 2018-12-14 VITALS
HEIGHT: 72 IN | SYSTOLIC BLOOD PRESSURE: 120 MMHG | OXYGEN SATURATION: 99 % | DIASTOLIC BLOOD PRESSURE: 68 MMHG | BODY MASS INDEX: 30.48 KG/M2 | WEIGHT: 225 LBS | RESPIRATION RATE: 18 BRPM | TEMPERATURE: 98.1 F | HEART RATE: 68 BPM

## 2018-12-14 DIAGNOSIS — Z71.89 COUNSELING REGARDING ADVANCED DIRECTIVES: ICD-10-CM

## 2018-12-14 DIAGNOSIS — E03.9 HYPOTHYROIDISM, UNSPECIFIED TYPE: Primary | ICD-10-CM

## 2018-12-14 DIAGNOSIS — Z11.4 SCREENING FOR HIV (HUMAN IMMUNODEFICIENCY VIRUS): ICD-10-CM

## 2018-12-14 LAB — TSH SERPL DL<=0.005 MIU/L-ACNC: 2.76 MU/L (ref 0.4–4)

## 2018-12-14 PROCEDURE — 84443 ASSAY THYROID STIM HORMONE: CPT | Performed by: FAMILY MEDICINE

## 2018-12-14 PROCEDURE — 36415 COLL VENOUS BLD VENIPUNCTURE: CPT | Performed by: FAMILY MEDICINE

## 2018-12-14 PROCEDURE — 90750 HZV VACC RECOMBINANT IM: CPT | Performed by: FAMILY MEDICINE

## 2018-12-14 PROCEDURE — 99213 OFFICE O/P EST LOW 20 MIN: CPT | Mod: 25 | Performed by: FAMILY MEDICINE

## 2018-12-14 PROCEDURE — 87389 HIV-1 AG W/HIV-1&-2 AB AG IA: CPT | Performed by: FAMILY MEDICINE

## 2018-12-14 PROCEDURE — 90471 IMMUNIZATION ADMIN: CPT | Performed by: FAMILY MEDICINE

## 2018-12-14 RX ORDER — LEVOTHYROXINE SODIUM 88 UG/1
88 TABLET ORAL DAILY
Qty: 90 TABLET | Refills: 3 | Status: SHIPPED | OUTPATIENT
Start: 2018-12-14 | End: 2019-12-31

## 2018-12-14 SDOH — HEALTH STABILITY: MENTAL HEALTH: HOW OFTEN DO YOU HAVE 6 OR MORE DRINKS ON ONE OCCASION?: WEEKLY

## 2018-12-14 ASSESSMENT — PAIN SCALES - GENERAL: PAINLEVEL: NO PAIN (0)

## 2018-12-14 ASSESSMENT — MIFFLIN-ST. JEOR: SCORE: 1893.59

## 2018-12-14 NOTE — LETTER
December 18, 2018      Dom Durham  [address]  [place] [address]        Dear ,    We are writing to inform you of your test results.    Your test results fall within the expected range(s) or remain unchanged from previous results.  Please continue with current treatment plan.    Resulted Orders   TSH   Result Value Ref Range    TSH 2.76 0.40 - 4.00 mU/L   HIV Antigen Antibody Combo   Result Value Ref Range    HIV Antigen Antibody Combo Nonreactive NR^Nonreactive          Comment:      HIV-1 p24 Ag & HIV-1/HIV-2 Ab Not Detected       If you have any questions or concerns, please call the clinic at the number listed above.       Sincerely,        Ibrahima Casper MD

## 2018-12-14 NOTE — PROGRESS NOTES
SUBJECTIVE:  Jigar is a 53 year old male who comes in today for recheck on his hypothyroidism.  He has not had any issues or problems regarding this disease.  He is due for a TSH recheck.    ROS:  10 point ROS of systems including Constitutional, Eyes, HENT, Respiratory, Cardiovascular, Gastroenterology, Genitourinary, Integumentary, Muscularskeletal, Psychiatric were all negative except for pertinent positives noted in my HPI.     OBJECTIVE:  /68   Pulse 68   Temp 98.1  F (36.7  C) (Temporal)   Resp 18   Ht 1.829 m (6')   Wt 102.1 kg (225 lb)   SpO2 99%   BMI 30.52 kg/m    Body mass index is 30.52 kg/m .  Physical Exam   Constitutional: He appears well-developed and well-nourished.   Cardiovascular: Normal rate, regular rhythm, S1 normal, S2 normal and normal heart sounds.   No murmur heard.  Pulmonary/Chest: Effort normal and breath sounds normal. No respiratory distress. He has no wheezes. He has no rhonchi. He has no rales.   Neurological: He is alert.   Psychiatric: He has a normal mood and affect. His behavior is normal. Judgment and thought content normal.       ASSESSMENT/ORDERS:    ICD-10-CM    1. Hypothyroidism, unspecified type E03.9 levothyroxine (SYNTHROID/LEVOTHROID) 88 MCG tablet     TSH   2. Counseling regarding advanced directives Z71.89 EA ADD'L VACCINE   3. Screening for HIV (human immunodeficiency virus) Z11.4 HIV Antigen Antibody Combo     PLAN:  1.  Patient's Synthroid dose was refilled at current dose and he will get his TSH rechecked today.  2.  Patient is due for other health maintenance as noted above.  He got his first Shingrix dose today and he was encouraged to come back in 2-6 months for his second dose.    Follow up with Provider - Return in about 1 year (around 12/14/2019) for preventative visit (Physical).      Ibrahima Casper MD

## 2018-12-15 LAB — HIV 1+2 AB+HIV1 P24 AG SERPL QL IA: NONREACTIVE

## 2019-12-28 ENCOUNTER — TELEPHONE (OUTPATIENT)
Dept: FAMILY MEDICINE | Facility: CLINIC | Age: 54
End: 2019-12-28

## 2019-12-28 DIAGNOSIS — E03.9 HYPOTHYROIDISM, UNSPECIFIED TYPE: ICD-10-CM

## 2019-12-28 NOTE — LETTER
[address] [address]  [phone]        December 31, 2019    Dom Durham  [address]  [place] [address]          Dear Dom,     This letter is to inform you that we have filled your synthroid x1 cause you are due to see Dr. Casper for a followup visit.  Please call [phone] to make that appt ASAP.     Sincerely,        Ibrahima Casper MD

## 2019-12-31 RX ORDER — LEVOTHYROXINE SODIUM 88 UG/1
88 TABLET ORAL DAILY
Qty: 30 TABLET | Refills: 0 | Status: SHIPPED | OUTPATIENT
Start: 2019-12-31 | End: 2021-10-15

## 2019-12-31 NOTE — TELEPHONE ENCOUNTER
"Requested Prescriptions   Pending Prescriptions Disp Refills     levothyroxine (SYNTHROID/LEVOTHROID) 88 MCG tablet [Pharmacy Med Name: LEVOTHYROXINE SODIUM 88 MCG 88 TAB] 90 tablet 3     Sig: TAKE 1 TABLET (88 MCG) BY MOUTH DAILY   Last Written Prescription Date:  12/14/18  Last Fill Quantity: 90,  # refills: 3   Last office visit: 12/14/2018 with prescribing provider:     Future Office Visit:        Thyroid Protocol Failed - 12/28/2019  9:53 AM        Failed - Recent (12 mo) or future (30 days) visit within the authorizing provider's specialty     Patient has had an office visit with the authorizing provider or a provider within the authorizing providers department within the previous 12 mos or has a future within next 30 days. See \"Patient Info\" tab in inbasket, or \"Choose Columns\" in Meds & Orders section of the refill encounter.              Failed - Normal TSH on file in past 12 months     Recent Labs   Lab Test 12/14/18  1152   TSH 2.76              Passed - Patient is 12 years or older        Passed - Medication is active on med list            "

## 2019-12-31 NOTE — TELEPHONE ENCOUNTER
Rx refilled per RN protocol.  1 month    Will forward to schedulers to schedule patient for OV.  Lona Anna RN

## 2021-10-01 ENCOUNTER — OFFICE VISIT (OUTPATIENT)
Dept: FAMILY MEDICINE | Facility: CLINIC | Age: 56
End: 2021-10-01
Payer: COMMERCIAL

## 2021-10-01 VITALS
HEART RATE: 81 BPM | RESPIRATION RATE: 16 BRPM | TEMPERATURE: 98.6 F | OXYGEN SATURATION: 98 % | SYSTOLIC BLOOD PRESSURE: 155 MMHG | DIASTOLIC BLOOD PRESSURE: 106 MMHG

## 2021-10-01 DIAGNOSIS — B96.89 ACUTE BRONCHITIS, BACTERIAL: Primary | ICD-10-CM

## 2021-10-01 DIAGNOSIS — I10 BENIGN ESSENTIAL HYPERTENSION: ICD-10-CM

## 2021-10-01 DIAGNOSIS — J20.8 ACUTE BRONCHITIS, BACTERIAL: Primary | ICD-10-CM

## 2021-10-01 PROBLEM — L30.9 DERMATITIS OF EXTERNAL EAR: Status: ACTIVE | Noted: 2020-01-24

## 2021-10-01 PROBLEM — H91.93 BILATERAL HEARING LOSS: Status: ACTIVE | Noted: 2020-01-24

## 2021-10-01 PROBLEM — Z86.0100 HX OF COLONIC POLYP: Status: ACTIVE | Noted: 2020-01-24

## 2021-10-01 PROCEDURE — 99214 OFFICE O/P EST MOD 30 MIN: CPT | Performed by: NURSE PRACTITIONER

## 2021-10-01 RX ORDER — AZITHROMYCIN 250 MG/1
TABLET, FILM COATED ORAL
Qty: 6 TABLET | Refills: 0 | Status: SHIPPED | OUTPATIENT
Start: 2021-10-01 | End: 2021-10-06

## 2021-10-01 RX ORDER — AMLODIPINE BESYLATE 5 MG/1
5 TABLET ORAL
COMMUNITY
Start: 2021-09-27 | End: 2021-10-15

## 2021-10-01 NOTE — PATIENT INSTRUCTIONS
I have prescribed an antibiotic for you today. You will take 2 tablets today then 1 tablet daily with food for 5 days total. Please take a probiotic or yogurt while on this medication as this will help protect the good bacteria in your colon. Drink plenty of fluids and rest. I also recommend a nightly humidifier if you have one available.    If symptoms are not improving with treatment plan please return to clinic for further evaluation.

## 2021-10-01 NOTE — PROGRESS NOTES
Assessment & Plan     Acute bronchitis, bacterial  Home care instructions were reviewed with the patient. The risks, benefits and treatment options of prescribed medications or other treatments have been discussed with the patient. The patient verbalized their understanding and should call or follow up if no improvement or if they develop further problems.      - azithromycin (ZITHROMAX) 250 MG tablet; Take 2 tablets (500 mg) by mouth daily for 1 day, THEN 1 tablet (250 mg) daily for 4 days.    Benign essential hypertension  Patient just recently restarted taking Norvasc 5 mg daily he has been monitoring his blood pressure at home.  He will return to clinic when symptoms have improved for follow-up blood pressure management.               Patient Instructions   I have prescribed an antibiotic for you today. You will take 2 tablets today then 1 tablet daily with food for 5 days total. Please take a probiotic or yogurt while on this medication as this will help protect the good bacteria in your colon. Drink plenty of fluids and rest. I also recommend a nightly humidifier if you have one available.    If symptoms are not improving with treatment plan please return to clinic for further evaluation.        No follow-ups on file.    KASIE Agarwal [facility]    Prashanth Kimball is a 56 year old who presents for the following health issues     HPI     Acute Illness  Acute illness concerns: cough  Onset/Duration: 1 week  Symptoms:  Fever: YES- thinks on and off last Thursday, had a covid test on Monday - negative  Chills/Sweats: YES- last weekend  Headache (location?): YES  Sinus Pressure: YES  Conjunctivitis:  no  Ear Pain: no  Rhinorrhea: YES  Congestion: YES  Sore Throat: no  Cough: YES-productive of green sputum, with shortness of breath, improving over time  Wheeze: YES  Decreased Appetite: YES  Nausea: no  Vomiting: no  Diarrhea: no  Dysuria/Freq.: no  Dysuria or Hematuria:  no  Fatigue/Achiness: YES  Sick/Strep Exposure: no  Therapies tried:  advil     Patient with approximately 7-day history of acute lung infection states that he has been coughing up thick mucus and feeling achy and tired.  He does have sinusitis and congestion.  Denies ear pain    Elevated blood pressure today patient states he just restarted his blood pressure medication he had been off for some time but a blood pressure monitor for home states that he has been running high and decided to put himself back on his medication that he took himself off of which is Norvasc 5 mg daily.  Denies chest pain headache.    Review of Systems   Constitutional, HEENT, cardiovascular, pulmonary, GI, , musculoskeletal, neuro, skin, endocrine and psych systems are negative, except as otherwise noted.      Objective    BP (!) 155/106   Pulse 81   Temp 98.6  F (37  C)   Resp 16   SpO2 98%   There is no height or weight on file to calculate BMI.  Physical Exam   GENERAL: alert and fatigued  EYES: Eyes grossly normal to inspection, PERRL and conjunctivae and sclerae normal  HENT: normal cephalic/atraumatic, ear canals and TM's normal, nose and mouth without ulcers or lesions, rhinorrhea yellow, oropharynx clear, oral mucous membranes moist and sinuses: not tender  NECK: no adenopathy, no asymmetry, masses, or scars and thyroid normal to palpation  RESP: bronchial breath sounds throughout  CV: regular rate and rhythm, normal S1 S2, no S3 or S4, no murmur, click or rub, no peripheral edema and peripheral pulses strong  MS: no gross musculoskeletal defects noted, no edema  SKIN: no suspicious lesions or rashes  NEURO: Normal strength and tone, mentation intact and speech normal  PSYCH: mentation appears normal, affect normal/bright

## 2021-10-01 NOTE — LETTER
[address] [address]  Phone: [phone]  Fax: [phone]    October 1, 2021        Dom Durham  [address]  [place] [address]          To whom it may concern:    RE: Dom Durham    Patient was seen and treated today at our clinic and missed work. Due to acute bacterial lung infection. Patient may return to work when symptoms have improved likely 2-5 days.     Please contact me for questions or concerns.      Sincerely,        KASIE Agarwal CNP

## 2021-10-07 ENCOUNTER — NURSE TRIAGE (OUTPATIENT)
Dept: NURSING | Facility: CLINIC | Age: 56
End: 2021-10-07

## 2021-10-07 NOTE — TELEPHONE ENCOUNTER
Diagnosed with lung infection on Friday. Given 5 days of antibiotics and a note to return to work in 2-5 days. Still not feeling well. Looking for more medication/antibiotics AND a note faxed to his work to extend the likely dee of returning in another 2-5 days. When he clears his throat, he chokes. At work he can't continue to swallow what he coughs up and feels sick, lungs hurt.  Fax for work:  [phone]. Attention to Pola Bryan.  Please call patient at home:  [phone]. He saw Danuta Eagle CNP. Dr Ibrahima Casper is his primary.  Thank you,  Gavi Tariq RN  [facility]    Reason for Disposition    [1] Caller has NON-URGENT medication question about med that PCP prescribed AND [2] triager unable to answer question    Additional Information    Negative: Drug overdose and triager unable to answer question    Negative: Caller requesting information unrelated to medicine    Negative: Caller requesting a prescription for Strep throat and has a positive culture result    Negative: Rash while taking a medication or within 3 days of stopping it    Negative: Immunization reaction suspected    Negative: [1] Asthma and [2] having symptoms of asthma (cough, wheezing, etc.)    Negative: [1] Influenza symptoms AND [2] anti-viral med prescription request, such as Tamiflu    Negative: [1] Symptom of illness (e.g., headache, abdominal pain, earache, vomiting) AND [2] more than mild    Negative: MORE THAN A DOUBLE DOSE of a prescription or over-the-counter (OTC) drug    Negative: [1] DOUBLE DOSE (an extra dose or lesser amount) of over-the-counter (OTC) drug AND [2] any symptoms (e.g., dizziness, nausea, pain, sleepiness)    Negative: [1] DOUBLE DOSE (an extra dose or lesser amount) of prescription drug AND [2] any symptoms (e.g., dizziness, nausea, pain, sleepiness)    Negative: Took another person's prescription drug    Negative: [1] DOUBLE DOSE (an extra dose or lesser amount) of prescription drug  "AND [2] NO symptoms (Exception: a double dose of antibiotics)    Negative: Diabetes drug error or overdose (e.g., took wrong type of insulin or took extra dose)    Negative: [1] Request for URGENT new prescription or refill of \"essential\" medication (i.e., likelihood of harm to patient if not taken) AND [2] triager unable to fill per unit policy    Negative: [1] Prescription not at pharmacy AND [2] was prescribed by PCP recently    Negative: [1] Pharmacy calling with prescription questions AND [2] triager unable to answer question    Negative: [1] Caller has URGENT medication question about med that PCP or specialist prescribed AND [2] triager unable to answer question    Protocols used: MEDICATION QUESTION CALL-A-AH      "

## 2021-10-07 NOTE — TELEPHONE ENCOUNTER
Patient needs in-office visit with repeat evaluation and likely a chest x-ray if he is not feeling better.  I cannot see him until tomorrow at 8:30.    Sending to staff to help with scheduling appointment with me or any available provider.    Ibrahima Casper MD

## 2021-10-08 ENCOUNTER — OFFICE VISIT (OUTPATIENT)
Dept: FAMILY MEDICINE | Facility: CLINIC | Age: 56
End: 2021-10-08
Payer: COMMERCIAL

## 2021-10-08 ENCOUNTER — HOSPITAL ENCOUNTER (OUTPATIENT)
Dept: GENERAL RADIOLOGY | Facility: CLINIC | Age: 56
Discharge: HOME | End: 2021-10-08
Attending: FAMILY MEDICINE | Admitting: FAMILY MEDICINE
Payer: COMMERCIAL

## 2021-10-08 VITALS
BODY MASS INDEX: 30.03 KG/M2 | SYSTOLIC BLOOD PRESSURE: 132 MMHG | WEIGHT: 221.4 LBS | TEMPERATURE: 97.9 F | RESPIRATION RATE: 20 BRPM | HEART RATE: 100 BPM | DIASTOLIC BLOOD PRESSURE: 86 MMHG | OXYGEN SATURATION: 99 %

## 2021-10-08 DIAGNOSIS — E03.9 HYPOTHYROIDISM, UNSPECIFIED TYPE: ICD-10-CM

## 2021-10-08 DIAGNOSIS — R05.9 COUGH: ICD-10-CM

## 2021-10-08 DIAGNOSIS — R06.02 SHORTNESS OF BREATH: Primary | ICD-10-CM

## 2021-10-08 DIAGNOSIS — I10 ESSENTIAL HYPERTENSION: ICD-10-CM

## 2021-10-08 DIAGNOSIS — R06.02 SHORTNESS OF BREATH: ICD-10-CM

## 2021-10-08 LAB
ANION GAP SERPL CALCULATED.3IONS-SCNC: 6 MMOL/L (ref 3–14)
BASOPHILS # BLD AUTO: 0.1 10E3/UL (ref 0–0.2)
BASOPHILS NFR BLD AUTO: 1 %
BUN SERPL-MCNC: 18 MG/DL (ref 7–30)
CALCIUM SERPL-MCNC: 8.9 MG/DL (ref 8.5–10.1)
CHLORIDE BLD-SCNC: 105 MMOL/L (ref 94–109)
CHOLEST SERPL-MCNC: 229 MG/DL
CO2 SERPL-SCNC: 25 MMOL/L (ref 20–32)
CREAT SERPL-MCNC: 1.06 MG/DL (ref 0.66–1.25)
CRP SERPL-MCNC: <2.9 MG/L (ref 0–8)
EOSINOPHIL # BLD AUTO: 0.3 10E3/UL (ref 0–0.7)
EOSINOPHIL NFR BLD AUTO: 3 %
ERYTHROCYTE [DISTWIDTH] IN BLOOD BY AUTOMATED COUNT: 12.6 % (ref 10–15)
FASTING STATUS PATIENT QL REPORTED: NO
GFR SERPL CREATININE-BSD FRML MDRD: 77 ML/MIN/1.73M2
GLUCOSE BLD-MCNC: 101 MG/DL (ref 70–99)
HCT VFR BLD AUTO: 47 % (ref 40–53)
HDLC SERPL-MCNC: 50 MG/DL
HGB BLD-MCNC: 16 G/DL (ref 13.3–17.7)
IMM GRANULOCYTES # BLD: 0 10E3/UL
IMM GRANULOCYTES NFR BLD: 0 %
LDLC SERPL CALC-MCNC: 151 MG/DL
LYMPHOCYTES # BLD AUTO: 1.8 10E3/UL (ref 0.8–5.3)
LYMPHOCYTES NFR BLD AUTO: 20 %
MCH RBC QN AUTO: 32.5 PG (ref 26.5–33)
MCHC RBC AUTO-ENTMCNC: 34 G/DL (ref 31.5–36.5)
MCV RBC AUTO: 95 FL (ref 78–100)
MONOCYTES # BLD AUTO: 0.7 10E3/UL (ref 0–1.3)
MONOCYTES NFR BLD AUTO: 8 %
NEUTROPHILS # BLD AUTO: 6.1 10E3/UL (ref 1.6–8.3)
NEUTROPHILS NFR BLD AUTO: 68 %
NONHDLC SERPL-MCNC: 179 MG/DL
NRBC # BLD AUTO: 0 10E3/UL
NRBC BLD AUTO-RTO: 0 /100
PLATELET # BLD AUTO: 191 10E3/UL (ref 150–450)
POTASSIUM BLD-SCNC: 4.4 MMOL/L (ref 3.4–5.3)
RBC # BLD AUTO: 4.93 10E6/UL (ref 4.4–5.9)
SODIUM SERPL-SCNC: 136 MMOL/L (ref 133–144)
TRIGL SERPL-MCNC: 138 MG/DL
TSH SERPL DL<=0.005 MIU/L-ACNC: 3.18 MU/L (ref 0.4–4)
WBC # BLD AUTO: 9 10E3/UL (ref 4–11)

## 2021-10-08 PROCEDURE — 80048 BASIC METABOLIC PNL TOTAL CA: CPT | Performed by: FAMILY MEDICINE

## 2021-10-08 PROCEDURE — 71046 X-RAY EXAM CHEST 2 VIEWS: CPT

## 2021-10-08 PROCEDURE — 36415 COLL VENOUS BLD VENIPUNCTURE: CPT | Performed by: FAMILY MEDICINE

## 2021-10-08 PROCEDURE — 99214 OFFICE O/P EST MOD 30 MIN: CPT | Performed by: FAMILY MEDICINE

## 2021-10-08 PROCEDURE — 80061 LIPID PANEL: CPT | Performed by: FAMILY MEDICINE

## 2021-10-08 PROCEDURE — 85025 COMPLETE CBC W/AUTO DIFF WBC: CPT | Performed by: FAMILY MEDICINE

## 2021-10-08 PROCEDURE — 86140 C-REACTIVE PROTEIN: CPT | Performed by: FAMILY MEDICINE

## 2021-10-08 PROCEDURE — 84443 ASSAY THYROID STIM HORMONE: CPT | Performed by: FAMILY MEDICINE

## 2021-10-08 RX ORDER — PREDNISONE 20 MG/1
60 TABLET ORAL DAILY
Qty: 15 TABLET | Refills: 0 | Status: SHIPPED | OUTPATIENT
Start: 2021-10-08 | End: 2021-10-13

## 2021-10-08 RX ORDER — INHALER, ASSIST DEVICES
SPACER (EA) MISCELLANEOUS
Qty: 1 EACH | Refills: 3 | Status: SHIPPED | OUTPATIENT
Start: 2021-10-08 | End: 2023-01-23

## 2021-10-08 RX ORDER — ALBUTEROL SULFATE 90 UG/1
2 AEROSOL, METERED RESPIRATORY (INHALATION) EVERY 4 HOURS PRN
Qty: 18 G | Refills: 1 | Status: SHIPPED | OUTPATIENT
Start: 2021-10-08 | End: 2023-01-23

## 2021-10-08 ASSESSMENT — PAIN SCALES - GENERAL: PAINLEVEL: NO PAIN (0)

## 2021-10-08 NOTE — LETTER
October 8, 2021    To Whom it may Concern:    Dom Durham was seen today in clinic.  He is unable to work and will be reevaluated on Friday 10/15/2021 to see when he will be able to return and with any restrictions he may need.    If you have any further questions, please contact me at the number above.    Sincerely,        Ibrahima Casper MD

## 2021-10-08 NOTE — PROGRESS NOTES
Assessment & Plan     ASSESSMENT/ORDERS:    ICD-10-CM    1. Shortness of breath  R06.02 Lipid panel reflex to direct LDL Fasting     CRP, inflammation     CBC with platelets and differential     XR Chest 2 Views     Lipid panel reflex to direct LDL Fasting     CRP, inflammation     CBC with platelets and differential     albuterol (PROAIR HFA/PROVENTIL HFA/VENTOLIN HFA) 108 (90 Base) MCG/ACT inhaler     spacer (OPTICHAMBER DOMINIC) holding chamber     predniSONE (DELTASONE) 20 MG tablet     CANCELED: XR Chest 2 Views   2. Cough  R05.9 Lipid panel reflex to direct LDL Fasting     CRP, inflammation     CBC with platelets and differential     XR Chest 2 Views     Lipid panel reflex to direct LDL Fasting     CRP, inflammation     CBC with platelets and differential     albuterol (PROAIR HFA/PROVENTIL HFA/VENTOLIN HFA) 108 (90 Base) MCG/ACT inhaler     spacer (OPTICHAMBER DOMINIC) holding chamber     predniSONE (DELTASONE) 20 MG tablet     CANCELED: XR Chest 2 Views   3. Hypothyroidism, unspecified type  E03.9 TSH with free T4 reflex     TSH with free T4 reflex   4. Essential hypertension  I10 Basic metabolic panel  (Ca, Cl, CO2, Creat, Gluc, K, Na, BUN)     Basic metabolic panel  (Ca, Cl, CO2, Creat, Gluc, K, Na, BUN)     PLAN:  1.  Chest x-ray and labs negative for signs of pneumonia.  Trial of prednisone with albuterol inhaler/spacer to see if possible reactive airway from history of environmental exposure and recent viral upper respiratory illness.  Letter for no work until seen for follow-up evaluation in a week.  Can review hypertension and thyroid medication at that time.                  Return in about 1 week (around 10/15/2021).    Ibrahima Casper MD  [facility] is a 56 year old who presents for the following health issues     HPI     Chief Complaint   Patient presents with     RECHECK     bronchitis; still coughing but better         Patient having  shortness of breath at rest and worsening dyspnea on exertion with coujgh.  Was seen a week ago and diagnosed with bronchitis and given antibiotic.  No chest pressure.  Improving some but knows he is not able to work.  No history of asthma but has brother with it.  No fevers, chills, nausea.  Negative recent COVID-19 testing.    History of hypertension and hypothyroidism.  Has not been seen here for this for a while.  Is interested in changing back to this clinic.      Review of Systems         Objective    BP (!) 128/98   Pulse 100   Temp 97.9  F (36.6  C) (Temporal)   Resp 20   Wt 100.4 kg (221 lb 6.4 oz)   SpO2 99%   BMI 30.03 kg/m    Body mass index is 30.03 kg/m .  Physical Exam  Constitutional:       Appearance: He is well-developed.   HENT:      Head: Normocephalic.      Right Ear: Tympanic membrane, ear canal and external ear normal.      Left Ear: Tympanic membrane, ear canal and external ear normal.      Nose: No mucosal edema or rhinorrhea.      Right Sinus: No maxillary sinus tenderness or frontal sinus tenderness.      Left Sinus: No maxillary sinus tenderness or frontal sinus tenderness.      Mouth/Throat:      Mouth: Mucous membranes are not dry.      Pharynx: Uvula midline. No oropharyngeal exudate.      Tonsils: No tonsillar exudate.   Eyes:      General: Lids are normal.         Right eye: No discharge.         Left eye: No discharge.      Conjunctiva/sclera: Conjunctivae normal.   Neck:      Thyroid: No thyromegaly.   Cardiovascular:      Rate and Rhythm: Normal rate and regular rhythm.      Heart sounds: Normal heart sounds, S1 normal and S2 normal. No murmur heard.     Pulmonary:      Effort: Pulmonary effort is normal. No respiratory distress.      Breath sounds: Normal breath sounds. Decreased air movement present. No decreased breath sounds, wheezing, rhonchi or rales.      Comments: Has obvious shortness of breath when speaking and cannot talk in long sentences.   Musculoskeletal:       Cervical back: Normal range of motion and neck supple.   Lymphadenopathy:      Cervical: No cervical adenopathy.   Skin:     General: Skin is warm.      Findings: No erythema or rash.   Neurological:      Mental Status: He is alert.            Results for orders placed or performed during the hospital encounter of 10/08/21   XR Chest 2 Views     Status: None    Narrative    CHEST TWO VIEWS  10/8/2021 9:30 AM     HISTORY: 56-year-old patient with shortness of breath and cough.       Impression    IMPRESSION: Since October 5, 2017, heart size is normal. No pleural  effusion, pneumothorax, or abnormal area of consolidation.  Irregularity in a lateral left rib, 6th rib, likely a healed rib  fracture.    OSCAR CEDENO MD         SYSTEM ID:  [id number]   Results for orders placed or performed in visit on 10/08/21   TSH with free T4 reflex     Status: Normal   Result Value Ref Range    TSH 3.18 0.40 - 4.00 mU/L   Lipid panel reflex to direct LDL Fasting     Status: Abnormal   Result Value Ref Range    Cholesterol 229 (H) <200 mg/dL    Triglycerides 138 <150 mg/dL    Direct Measure HDL 50 >=40 mg/dL    LDL Cholesterol Calculated 151 (H) <=100 mg/dL    Non HDL Cholesterol 179 (H) <130 mg/dL    Patient Fasting > 8hrs? No     Narrative    Cholesterol  Desirable:  <200 mg/dL    Triglycerides  Normal:  Less than 150 mg/dL  Borderline High:  150-199 mg/dL  High:  200-499 mg/dL  Very High:  Greater than or equal to 500 mg/dL    Direct Measure HDL  Female:  Greater than or equal to 50 mg/dL   Male:  Greater than or equal to 40 mg/dL    LDL Cholesterol  Desirable:  <100mg/dL  Above Desirable:  100-129 mg/dL   Borderline High:  130-159 mg/dL   High:  160-189 mg/dL   Very High:  >= 190 mg/dL    Non HDL Cholesterol  Desirable:  130 mg/dL  Above Desirable:  130-159 mg/dL  Borderline High:  160-189 mg/dL  High:  190-219 mg/dL  Very High:  Greater than or equal to 220 mg/dL   CRP, inflammation     Status: Normal   Result Value Ref Range     CRP Inflammation <2.9 0.0 - 8.0 mg/L   Basic metabolic panel  (Ca, Cl, CO2, Creat, Gluc, K, Na, BUN)     Status: Abnormal   Result Value Ref Range    Sodium 136 133 - 144 mmol/L    Potassium 4.4 3.4 - 5.3 mmol/L    Chloride 105 94 - 109 mmol/L    Carbon Dioxide (CO2) 25 20 - 32 mmol/L    Anion Gap 6 3 - 14 mmol/L    Urea Nitrogen 18 7 - 30 mg/dL    Creatinine 1.06 0.66 - 1.25 mg/dL    Calcium 8.9 8.5 - 10.1 mg/dL    Glucose 101 (H) 70 - 99 mg/dL    GFR Estimate 77 >60 mL/min/1.73m2   CBC with platelets and differential     Status: None   Result Value Ref Range    WBC Count 9.0 4.0 - 11.0 10e3/uL    RBC Count 4.93 4.40 - 5.90 10e6/uL    Hemoglobin 16.0 13.3 - 17.7 g/dL    Hematocrit 47.0 40.0 - 53.0 %    MCV 95 78 - 100 fL    MCH 32.5 26.5 - 33.0 pg    MCHC 34.0 31.5 - 36.5 g/dL    RDW 12.6 10.0 - 15.0 %    Platelet Count 191 150 - 450 10e3/uL    % Neutrophils 68 %    % Lymphocytes 20 %    % Monocytes 8 %    % Eosinophils 3 %    % Basophils 1 %    % Immature Granulocytes 0 %    NRBCs per 100 WBC 0 <1 /100    Absolute Neutrophils 6.1 1.6 - 8.3 10e3/uL    Absolute Lymphocytes 1.8 0.8 - 5.3 10e3/uL    Absolute Monocytes 0.7 0.0 - 1.3 10e3/uL    Absolute Eosinophils 0.3 0.0 - 0.7 10e3/uL    Absolute Basophils 0.1 0.0 - 0.2 10e3/uL    Absolute Immature Granulocytes 0.0 <=0.0 10e3/uL    Absolute NRBCs 0.0 10e3/uL   CBC with platelets and differential     Status: None    Narrative    The following orders were created for panel order CBC with platelets and differential.  Procedure                               Abnormality         Status                     ---------                               -----------         ------                     CBC with platelets and d...[[id number]]                      Final result                 Please view results for these tests on the individual orders.

## 2021-10-08 NOTE — RESULT ENCOUNTER NOTE
"Will have staff send letter to patient with message regarding recent results:  \"Test results indicate your TSH was normal.  The rest of your labs results were discussed at your last visit and are of no concern.\"    Ibrahima Casper MD "

## 2021-10-08 NOTE — LETTER
"October 11, 2021      Dom Durham  [address]  [facility] [address]        Dear ,    We are writing to inform you of your test results.    TSH was normal.  The rest of your labs results were discussed at your last visit and are of no concern.\"       Resulted Orders   TSH with free T4 reflex   Result Value Ref Range    TSH 3.18 0.40 - 4.00 mU/L   Lipid panel reflex to direct LDL Fasting   Result Value Ref Range    Cholesterol 229 (H) <200 mg/dL    Triglycerides 138 <150 mg/dL    Direct Measure HDL 50 >=40 mg/dL    LDL Cholesterol Calculated 151 (H) <=100 mg/dL    Non HDL Cholesterol 179 (H) <130 mg/dL    Patient Fasting > 8hrs? No     Narrative    Cholesterol  Desirable:  <200 mg/dL    Triglycerides  Normal:  Less than 150 mg/dL  Borderline High:  150-199 mg/dL  High:  200-499 mg/dL  Very High:  Greater than or equal to 500 mg/dL    Direct Measure HDL  Female:  Greater than or equal to 50 mg/dL   Male:  Greater than or equal to 40 mg/dL    LDL Cholesterol  Desirable:  <100mg/dL  Above Desirable:  100-129 mg/dL   Borderline High:  130-159 mg/dL   High:  160-189 mg/dL   Very High:  >= 190 mg/dL    Non HDL Cholesterol  Desirable:  130 mg/dL  Above Desirable:  130-159 mg/dL  Borderline High:  160-189 mg/dL  High:  190-219 mg/dL  Very High:  Greater than or equal to 220 mg/dL   CRP, inflammation   Result Value Ref Range    CRP Inflammation <2.9 0.0 - 8.0 mg/L   Basic metabolic panel  (Ca, Cl, CO2, Creat, Gluc, K, Na, BUN)   Result Value Ref Range    Sodium 136 133 - 144 mmol/L    Potassium 4.4 3.4 - 5.3 mmol/L    Chloride 105 94 - 109 mmol/L    Carbon Dioxide (CO2) 25 20 - 32 mmol/L    Anion Gap 6 3 - 14 mmol/L    Urea Nitrogen 18 7 - 30 mg/dL    Creatinine 1.06 0.66 - 1.25 mg/dL    Calcium 8.9 8.5 - 10.1 mg/dL    Glucose 101 (H) 70 - 99 mg/dL    GFR Estimate 77 >60 mL/min/1.73m2      Comment:      As of July 11, 2021, eGFR is calculated by the CKD-EPI creatinine equation, without race adjustment. eGFR " can be influenced by muscle mass, exercise, and diet. The reported eGFR is an estimation only and is only applicable if the renal function is stable.   CBC with platelets and differential   Result Value Ref Range    WBC Count 9.0 4.0 - 11.0 10e3/uL    RBC Count 4.93 4.40 - 5.90 10e6/uL    Hemoglobin 16.0 13.3 - 17.7 g/dL    Hematocrit 47.0 40.0 - 53.0 %    MCV 95 78 - 100 fL    MCH 32.5 26.5 - 33.0 pg    MCHC 34.0 31.5 - 36.5 g/dL    RDW 12.6 10.0 - 15.0 %    Platelet Count 191 150 - 450 10e3/uL    % Neutrophils 68 %    % Lymphocytes 20 %    % Monocytes 8 %    % Eosinophils 3 %    % Basophils 1 %    % Immature Granulocytes 0 %    NRBCs per 100 WBC 0 <1 /100    Absolute Neutrophils 6.1 1.6 - 8.3 10e3/uL    Absolute Lymphocytes 1.8 0.8 - 5.3 10e3/uL    Absolute Monocytes 0.7 0.0 - 1.3 10e3/uL    Absolute Eosinophils 0.3 0.0 - 0.7 10e3/uL    Absolute Basophils 0.1 0.0 - 0.2 10e3/uL    Absolute Immature Granulocytes 0.0 <=0.0 10e3/uL    Absolute NRBCs 0.0 10e3/uL       If you have any questions or concerns, please call the clinic at the number listed above.       Sincerely,      Ibrahima Casper MD

## 2021-10-10 PROBLEM — I10 ESSENTIAL HYPERTENSION: Status: ACTIVE | Noted: 2021-10-10

## 2021-10-15 ENCOUNTER — OFFICE VISIT (OUTPATIENT)
Dept: FAMILY MEDICINE | Facility: CLINIC | Age: 56
End: 2021-10-15
Payer: COMMERCIAL

## 2021-10-15 VITALS
SYSTOLIC BLOOD PRESSURE: 128 MMHG | DIASTOLIC BLOOD PRESSURE: 80 MMHG | RESPIRATION RATE: 20 BRPM | WEIGHT: 220.8 LBS | TEMPERATURE: 97.8 F | OXYGEN SATURATION: 98 % | BODY MASS INDEX: 29.95 KG/M2 | HEART RATE: 72 BPM

## 2021-10-15 DIAGNOSIS — I10 ESSENTIAL HYPERTENSION: ICD-10-CM

## 2021-10-15 DIAGNOSIS — E03.9 HYPOTHYROIDISM, UNSPECIFIED TYPE: ICD-10-CM

## 2021-10-15 DIAGNOSIS — R06.02 SOB (SHORTNESS OF BREATH): ICD-10-CM

## 2021-10-15 DIAGNOSIS — R06.09 DOE (DYSPNEA ON EXERTION): Primary | ICD-10-CM

## 2021-10-15 PROCEDURE — 99214 OFFICE O/P EST MOD 30 MIN: CPT | Performed by: FAMILY MEDICINE

## 2021-10-15 RX ORDER — LEVOTHYROXINE SODIUM 88 UG/1
88 TABLET ORAL DAILY
Qty: 90 TABLET | Refills: 4 | Status: SHIPPED | OUTPATIENT
Start: 2021-10-15 | End: 2022-11-18

## 2021-10-15 RX ORDER — AMLODIPINE BESYLATE 5 MG/1
5 TABLET ORAL DAILY
Qty: 90 TABLET | Refills: 4 | Status: SHIPPED | OUTPATIENT
Start: 2021-10-15 | End: 2022-11-18

## 2021-10-15 ASSESSMENT — PAIN SCALES - GENERAL: PAINLEVEL: NO PAIN (0)

## 2021-10-15 NOTE — LETTER
October 15, 2021    To Whom it may Concern:    Dom Durham was seen today in clinic.  He may return to work without restriction.    If you have any further questions, please contact me at the number above.    Sincerely,        Ibrahima Casper MD

## 2021-10-15 NOTE — PROGRESS NOTES
Assessment & Plan     ASSESSMENT/ORDERS:    ICD-10-CM    1. MOON (dyspnea on exertion)  R06.00 General PFT Lab (Please always keep checked)     Pulmonary Function Test   2. SOB (shortness of breath)  R06.02 General PFT Lab (Please always keep checked)     Pulmonary Function Test   3. Hypothyroidism, unspecified type  E03.9 General PFT Lab (Please always keep checked)     Pulmonary Function Test     levothyroxine (SYNTHROID/LEVOTHROID) 88 MCG tablet   4. Essential hypertension  I10 amLODIPine (NORVASC) 5 MG tablet     PLAN:  1.  Recent history of dyspnea on exertion and shortness of breath with improvement with prednisone and albuterol inhaler.  No past history of asthma and no smoking history.  Recommended pulmonary function testing to better understand the nature of breathing issue.  Follow-up 2 weeks after he has his breathing test to review results and see how things are with frequency of albuterol inhaler.  2.   Medications refilled for all other above noted stable conditions.                  Return in about 2 months (around 12/15/2021) for recheck breathing after testing.    Ibrahima Casper MD  [facility] is a 56 year old who presents for the following health issues     HPI     Concern - recheck pneumonia  Onset: recheck  Description: recheck pneumonia  Intensity: mild  Progression of Symptoms:  improving  Accompanying Signs & Symptoms: none  Previous history of similar problem: no  Precipitating factors:        Worsened by: nothing  Alleviating factors:        Improved by: antibiotics, prednisone, albuterol inhaler  Therapies tried and outcome: Albuterol inhaler, prednisone, antibiotic      He felt albuterol inhaler and prednisone worked well to improve symptoms of dyspnea on exertion and shortness of breath.      Blood pressure better now that he is back on medication.    Review of Systems         Objective    /80 (BP Location: Right arm, Patient  Position: Chair, Cuff Size: Adult Large)   Pulse 72   Temp 97.8  F (36.6  C) (Temporal)   Resp 20   Wt 100.2 kg (220 lb 12.8 oz)   SpO2 98%   BMI 29.95 kg/m    Body mass index is 29.95 kg/m .  Physical Exam  Constitutional:       Appearance: He is well-developed.   Cardiovascular:      Rate and Rhythm: Normal rate and regular rhythm.      Heart sounds: Normal heart sounds, S1 normal and S2 normal. No murmur heard.     Pulmonary:      Effort: Pulmonary effort is normal. No respiratory distress.      Breath sounds: Normal breath sounds. No wheezing, rhonchi or rales.   Neurological:      Mental Status: He is alert.

## 2021-10-15 NOTE — NURSING NOTE
Health Maintenance Due   Topic Date Due     PREVENTIVE CARE VISIT  Never done     MICROALBUMIN  Never done     COVID-19 Vaccine (1) Never done     INFLUENZA VACCINE (1) 09/01/2021     Gely SO LPN

## 2022-01-27 ENCOUNTER — TELEPHONE (OUTPATIENT)
Dept: FAMILY MEDICINE | Facility: CLINIC | Age: 57
End: 2022-01-27

## 2022-01-27 ENCOUNTER — VIRTUAL VISIT (OUTPATIENT)
Dept: FAMILY MEDICINE | Facility: CLINIC | Age: 57
End: 2022-01-27
Payer: COMMERCIAL

## 2022-01-27 DIAGNOSIS — R19.7 DIARRHEA, UNSPECIFIED TYPE: ICD-10-CM

## 2022-01-27 DIAGNOSIS — R06.00 DYSPNEA, UNSPECIFIED TYPE: ICD-10-CM

## 2022-01-27 DIAGNOSIS — G44.209 TENSION HEADACHE: ICD-10-CM

## 2022-01-27 DIAGNOSIS — R50.9 FEVER AND CHILLS: ICD-10-CM

## 2022-01-27 DIAGNOSIS — U07.1 INFECTION DUE TO 2019 NOVEL CORONAVIRUS: Primary | ICD-10-CM

## 2022-01-27 PROCEDURE — 99214 OFFICE O/P EST MOD 30 MIN: CPT | Mod: 95 | Performed by: PHYSICIAN ASSISTANT

## 2022-01-27 RX ORDER — LOPERAMIDE HYDROCHLORIDE 2 MG/1
2 TABLET ORAL 3 TIMES DAILY PRN
Qty: 18 TABLET | Refills: 0 | Status: SHIPPED | OUTPATIENT
Start: 2022-01-27 | End: 2023-01-23

## 2022-01-27 RX ORDER — MELOXICAM 15 MG/1
15 TABLET ORAL DAILY
Qty: 30 TABLET | Refills: 0 | Status: SHIPPED | OUTPATIENT
Start: 2022-01-27 | End: 2023-01-23

## 2022-01-27 NOTE — TELEPHONE ENCOUNTER
Please email AVS and letter from today's visit, January 27, 2022, to the patient's email.    [email or web address]    Thanks,  Aftab Guevara PA-C on 1/27/2022 at 2:44 PM

## 2022-01-27 NOTE — PATIENT INSTRUCTIONS
Patient Education       Coronavirus Disease 2019 (COVID-19): Caring for Yourself or Others  If you or a household member test positive for COVID-19 or have symptoms like fever, cough, fatigue, loss of taste or smell, follow these guidelines for preventing spread of the virus and managing symptoms. This is regardless of your vaccination status.  If you have COVID-19 symptoms    Stay home. Call your healthcare provider and tell them you have COVID-19 symptoms or you have tested positive for COVID-19. Do this before going to any hospital or clinic. Follow your provider's instructions. You will be advised to isolate yourself at home. This is called self-isolation. Isolate even if you don't have COVID-19 symptoms but you test positive. See the CDC's website for current, detailed information about staying home. Also, follow your local area's instructions on testing and staying home.    Stay away from work, school, and public places. Limit physical contact with family members. Limit visitors. Don't kiss anyone or share eating or drinking utensils. Clean surfaces you touch with disinfectant. This is to help prevent the virus from spreading.    If you need to cough or sneeze, do it into a tissue. Then throw the tissue into the trash. If you don't have tissues, cough or sneeze into the bend of your elbow.    Wear a cloth face mask with 2 or more layers of washable, breathable fabric and a nose wire while in public or when indoors with people who don't live with you. Or you can wear a disposable paper mask with a cloth mask on top. Wear the mask so that it covers both your nose, mouth, and chin. Make sure the mask is snug around your nose and the sides of your face.    Don t share food or personal items with people in your household. This includes items like eating and drinking utensils, towels, and bedding.    If you need to go to a hospital or clinic, expect that the healthcare staff will wear protective equipment such as  masks, gowns, gloves, and eye protection. You may be advised to wait in or enter through a separate area. This is to prevent the possible virus from spreading.    Tell the healthcare staff about recent travel. This includes local travel on public transport. Staff may need to find other people you have been in contact with.    Follow all instructions the healthcare staff give you.    If you have been diagnosed with COVID-19    Stay home and isolate. Don t leave your home unless you need to get medical care. Don't go to work, school, or public areas. Don't use public transportation or taxis.    Follow all instructions from your healthcare provider. Call your healthcare provider s office before going. They can prepare and give you instructions. This will help prevent the virus from spreading.    If you need to go to a hospital or clinic, expect that the healthcare staff will wear protective equipment such as masks, gowns, gloves, and eye protection. You may be advised to wait in or enter through a separate area. This is to prevent the possible virus from spreading.    Wear a well-fitted face mask with 2 or more layers and a nose wire. Use either a cloth mask with layers of tightly woven, breathable fabric or a disposable paper mask with a cloth mask on top. This is to protect other people from your germs. If you are not able to wear a mask, your caregivers should. Make sure the mask is snug around your nose and the sides of your face.    Stay away from other people in your home. Stay in a separate room with its own bathroom, if possible.    Stay away from pets and other animals.    Don t share food or personal items with people in your household. This includes items like eating and drinking utensils, towels, and bedding.    If you need to cough or sneeze, do it into a tissue. Then throw the tissue into the trash. If you don't have tissues, cough or sneeze into the bend of your elbow.    Wash your hands  often.    Self-care at home   Prevention  Several vaccines and booster shots are available to prevent COVID-19 or reduce its severity. These vaccines reduce how severe the illness will be if you get the virus. No vaccine is ever 100% effective in preventing any illness, but the COVID-19 vaccines work well and are safe. One vaccine is available for people as young as 5. Booster shots are available for people as young as 12. Expert groups, including ACOG and CDC, advise pregnant or breastfeeding people to be vaccinated. Talk with your healthcare provider about which vaccine is best for you and your family and when you may need a booster shot.  Treatment  Current treatment is mainly aimed at helping your body while it fights the virus. This is known as supportive care. If you have confirmed COVID-19, talk with your healthcare provider. You may qualify for certain medicines approved by the FDA to prevent severe COVID-19 infection.  For serious COVID-19, you may need to stay in the hospital. Supportive care includes:    Getting rest. This helps your body fight the illness.    Staying hydrated.  Drinking liquids is the best way to prevent dehydration. Try to drink 6 to 8 glasses of liquids every day, or as advised by your provider. Also check with your provider about which fluids are best for you. Don't drink fluids that contain caffeine or alcohol.    Taking over-the-counter (OTC) pain medicine. These are used to help ease pain and reduce fever. Follow your healthcare provider's instructions for which OTC medicine to use.  If you've been treated for suspected or confirmed COVID-19, follow all of your healthcare team's instructions. This will include when it's OK to stop self-isolation. You may also get instructions on position changes to help your breathing, such as lying on your belly (prone positioning). If you were treated at a hospital and discharged, you may be sent home with a pulse oximeter. This is a small  electronic device that you clip on your fingertip. It measures the amount of oxygen in your body. Follow your healthcare team's instructions on its use, how they will be in touch with you, and let you know when to call them.  The FDA approved monoclonal antibody therapy for emergency investigational use in certain people who have a positive COVID-19 viral test and have mild to moderate symptoms but are not in the hospital. Your healthcare provider will advise you on whether monoclonal antibody therapy is appropriate for you. It's not approved to prevent COVID-19. It's approved for people 12 years and older who weigh at least 88 pounds (about 40 kgs) and are at high risk for severe COVID-19 and a hospital stay. This includes people who are 65 years and older and people with certain chronic conditions. Monoclonal antibody therapy is not approved for people who:    Are in the hospital with COVID-19, or    Need oxygen therapy for COVID-19, or    Need oxygen therapy for a chronic condition and need to have oxygen flow increased because of COVID-19  If you've had confirmed COVID-19, your healthcare team may ask you to consider donating your plasma. This is called COVID-19 convalescent plasma donation. Plasma from people fully recovered from COVID-19 may contain antibodies to help fight COVID-19 in people who are currently seriously ill with the disease. Experts don't know the safety of COVID-19 convalescent plasma or how well it works. Research continues. The FDA has approved it for emergency use in certain people with serious or life-threatening COVID-19.  Home care for a sick person     Follow all instructions from healthcare staff.    Wash your hands often.    Wear protective clothing as advised.    Make sure the sick person wears a mask. If they can't wear a mask, don't stay in the same room with the person. If you must be in the same room, wear a face mask. When wearing a mask, make sure that it covers both the nose  and mouth.    Keep track of the sick person s symptoms.    Clean home surfaces often with disinfectant. This includes phones, kitchen counters, fridge door handle, bathroom surfaces, and others.    Don t let anyone share household items with the sick person. This includes eating and drinking tools, towels, sheets, or blankets.    Clean fabrics and laundry thoroughly.    Keep other people and pets away from the sick person.    When you can stop isolation  When you have COVID-19, with or without symptoms, you should stay away from other people. This is called isolation.  For normally healthy children or adults with COVID-19 symptoms, CDC advises stopping isolation when all 3 of these are true:  1. You have had no fever for at least 24 hours. This means no fever without medicine that reduces fever, such as acetaminophen, for at least 24 hours.  2. If you have symptoms, your symptoms such as cough or trouble breathing have improved.  3. It has been at least 5 days since your first symptoms started.  For people who have COVID-19 but no symptoms, isolation can stop 5 days after the first positive test. To calculate your 5-day isolation period, day 0 is your first day of symptoms. Day 1 is the first full day after your symptoms developed. You can leave isolation after 5 full days.  People who have tested positive (even without symptoms) should wear a well-fitting mask 5 more days after leaving isolation. This is day 6 through day 10.  If you have a weak immune system and COVID-19, or if you've had severe COVID-19,  your instructions on when to stop isolation will be somewhat different. Some conditions and treatments can cause a weak immune system. These include cancer treatment, bone marrow or organ transplants, and conditions such as HIV or other immune system disorders. You may be advised to self-isolate up to 20 days after your symptoms first started. Your healthcare provider may want to retest you for COVID-19. Follow  your provider's instructions.  CDC mask guidance  Consider the CDC's guidance and your local community's instructions on face masks.    Wear a well-fitting mask that is snug around your nose, mouth, and chin.    Choose a mask with a nose wire. Bend the wire over your nose to fit close to your face. This prevents air from leaking out of the top of the mask.    Cloth masks may help prevent people who have COVID-19 from spreading the virus to others.    Cloth masks are most likely to reduce COVID-19 spread when masks are widely used by people who are out in the public.    Use 2 layers of material for your mask. Wear a cloth mask that has multiple layers of fabric or wear a disposable mask underneath a cloth mask.    Wear a mask inside your house if you live with someone who has symptoms of COVID-19 or has tested positive for COVID-19.    CDC advises all people older than 2 who are not fully vaccinated to wear a mask and stay 6 feet away from others while in public.    CDC advises people with a weak immune system, even if fully vaccinated, to continue wearing masks and to stay 6 feet away from others while in public.    In  through grade 12 classes, CDC advises indoor masking for all teachers, staff, students ages 2 and older, and visitors to schools, regardless of vaccination status.    Like other viruses, the virus that causes COVID-19 changes (mutates) all the time. This leads to variants that are easily spread, including the delta variant. To protect against variants, CDC advises all people over age 2, including those who are fully vaccinated, to wear a mask indoors in public settings if you are in an area of high numbers of COVID-19 cases. See the CDC's county data website for current transmission information in your area.  Certain people should not wear a face mask. This includes:    Children younger than 2 years old    Anyone with a health, developmental, or mental health condition that can be made  worse by wearing a mask    Anyone who is unconscious or unable to remove the face covering without help  See the CDC's mask guidance.  When to call your healthcare provider  Call your healthcare provider right away if a sick person has any of these:    Trouble breathing    Pain or pressure in chest  If a sick person has any of these, call 911:    Trouble breathing that gets worse    Pain or pressure in chest that gets worse    Blue tint to lips or face    Fast or irregular heartbeat    Confusion or trouble waking    Fainting or loss of consciousness    Coughing up blood  Going home from the hospital  If you were diagnosed with COVID-19 and were recently discharged from the hospital:    Follow the instructions above for self-care and isolation.    Follow the hospital healthcare team s specific instructions.    Ask questions if anything is unclear to you. Write down answers so you remember them.  Date last modified: 1/7/2022  [employer] last reviewed this educational content on 12/1/2021 2000-2022 [employer]. All rights reserved. This information is not intended as a substitute for professional medical care. Always follow your healthcare professional's instructions.

## 2022-01-27 NOTE — LETTER
January 27, 2022      Dom Durham  [address]  [facility] [address]        To Whom It May Concern,       Dom was evaluated via virtual visit today, January 27, 2022, for covid 19 infection. He tested positive for covid 19 on Monday 01/24/2022 through a test at work. He has been advised to quarantine for 10 days, or if symptoms persist, until 24 hour symptom free.       Sincerely,        Aftab Guevara PA-C

## 2022-01-27 NOTE — PROGRESS NOTES
Jigar is a 56 year old who is being evaluated via a billable telephone visit.      What phone number would you like to be contacted at? [phone]  How would you like to obtain your AVS? Mail a copy    Assessment & Plan     Infection due to 2019 novel coronavirus  Patient tested positive on 01/24, but symptoms began on 01/21. Unfortunately, he is outside of the window to start oral treatments. He has been managing his illness with rest and OTC therapies. He was given education on the importance of staying well hydrated and maintaining a balanced diet. Educational information attached to AVS.     Dyspnea, unspecified type  He does have an albuterol inhaler, but has not been using it. Educated on the MOA of TAZ. I advised that he use this as he has been experiencing SOB with light activity. If he does not improve as expected he will call and we can add oral steroid. I did advise that the patient  an oximeter and reviewed what the threshold is for seeking emergent care if O2 sats drop too low.     Tension headache  Denies alarm symptoms. Reviewed home therapies for management of headaches including ice/cool compress. Patient will have mobic to use as needed. Cautioned against use of NSAIDs while taking this medication. He can use tylenol every 4-6 hours as needed.   - meloxicam (MOBIC) 15 MG tablet; Take 1 tablet (15 mg) by mouth daily (do not use with other NSAIDs (ibuprofen, advil, motrin, aleve, naproxen, aspirin, etc)    Fever and chills  Patient will continue use of antipyretic medication regularly to control fever. Stressed importance of staying well hydrated and eating balanced diet.   - meloxicam (MOBIC) 15 MG tablet; Take 1 tablet (15 mg) by mouth daily (do not use with other NSAIDs (ibuprofen, advil, motrin, aleve, naproxen, aspirin, etc)    Diarrhea, unspecified type  Patient did have an episode of fecal incontinence while passing gas yesterday. He has not had a BM since this occurrence. He will have  loperamide to use as needed to manage loose stools. Cautioned about overuse of this medication as it can cause constipation. He will operate on bland diet and will slowly introduce normal diet as tolerated.   - loperamide (IMODIUM A-D) 2 MG tablet; Take 1 tablet (2 mg) by mouth 3 times daily as needed for diarrhea    GWENDOLYN Cheung [facility] CARRIE Kimball is a 56 year old who presents for the following health issues     HPI     Acute Illness  Acute illness concerns: Positive covid test at work  Onset/Duration: 6 days  Symptoms:  Fever: no  Chills/Sweats: YES  Headache (location?): YES  Sinus Pressure: no  Conjunctivitis:  no  Ear Pain: no  Rhinorrhea: no  Congestion: no  Sore Throat: no  Cough: YES - a little bit  Wheeze: no  Shortness of Breath  Decreased Appetite: YES  Nausea: no  Vomiting: no  Diarrhea: YES  Dysuria/Freq.: no  Dysuria or Hematuria: no  Fatigue/Achiness: YES  Sick/Strep Exposure: no  Therapies tried and outcome: Advil     Patient is a 56 year old male who calls in today with report of positive covid test through his employer. He said that his symptoms began on Friday with feeling chills. Recalls that after getting home from work he slept for an extended period of time, overnight Friday to Saturday. While sleeping he would wake every couple of hours to change his clothing as he was breaking into sweats. On Sunday the symptoms seemed to improve a small amount, so he felt that he could return to work on Monday 01/24. He says that his employer saw him and made him complete a covid test which returned positive and he was sent home. Over this week he has struggled with headache, gastrointestinal upset and fatigue. It is hard to lay in bed due to discomfort and he reports that he will switche from recliner to bed throughout the day. He has noticed that walking to his recliner, which is in a heated workshop, will cause shortness of breath and fatigue. He has a low  tolerance to light activity which is not surprising. I recommended that he  an oximeter and monitor his oxygen saturation. He will seek emergent care if O2 sats drop below 90%. I also recommended that he begin using his inhaler as he has not done this yet. He was under the impression it was for coughing.     [email or web address]    Review of Systems   Constitutional, HEENT, cardiovascular, pulmonary, gi and gu systems are negative, except as otherwise noted.      Objective           Vitals:  No vitals were obtained today due to virtual visit.    Physical Exam   healthy, no distress and fatigued  PSYCH: Alert and oriented times 3; coherent speech, normal   rate and volume, able to articulate logical thoughts, able   to abstract reason, no tangential thoughts, no hallucinations   or delusions  His affect is normal  RESP: No cough, no audible wheezing, able to talk in full sentences  Remainder of exam unable to be completed due to telephone visits          Phone call duration: 15 minutes

## 2022-02-16 DIAGNOSIS — U07.1 INFECTION DUE TO 2019 NOVEL CORONAVIRUS: Primary | ICD-10-CM

## 2022-02-16 RX ORDER — LEVALBUTEROL TARTRATE 45 UG/1
1-2 AEROSOL, METERED ORAL EVERY 4 HOURS PRN
Qty: 15 G | Refills: 0 | Status: SHIPPED | OUTPATIENT
Start: 2022-02-16 | End: 2023-01-23

## 2022-02-16 NOTE — TELEPHONE ENCOUNTER
Patient's ALBUTEROL (V) INH  isn't covered by insurance this year. The new cover medication is Albutero; sulfate CFC-free aerosol (except NDC [id number]), levalbuterol tartrate CFC-free aerosol.  Thank you.  Edna

## 2022-09-08 ENCOUNTER — TELEPHONE (OUTPATIENT)
Dept: FAMILY MEDICINE | Facility: CLINIC | Age: 57
End: 2022-09-08

## 2022-09-08 DIAGNOSIS — Z86.0100 HX OF COLONIC POLYP: Primary | ICD-10-CM

## 2022-09-08 NOTE — TELEPHONE ENCOUNTER
Pt calling about getting a colonoscopy. He states that he got a letter in the mail stating that he is due. He said they found a polyp at last screening so they told him to come back in 5 years (due last year). He is looking for an order to be placed so he can schedule one.

## 2022-09-08 NOTE — TELEPHONE ENCOUNTER
Orders signed.  Sending back to team to follow-up on notification and/or subsequent scheduling.    Ibrahima Casper MD

## 2022-09-09 ENCOUNTER — TELEPHONE (OUTPATIENT)
Dept: GASTROENTEROLOGY | Facility: CLINIC | Age: 57
End: 2022-09-09

## 2022-09-09 NOTE — TELEPHONE ENCOUNTER
Let patient know the colonoscopy order was in and speciality clinic should be call. Also asked if he would like to schedule his preventative care visit. He is going to call at a later time.  Edna WHITTINGTON

## 2022-09-09 NOTE — TELEPHONE ENCOUNTER
Screening Questions    BlueKIND OF PREP RedLOCATION [review exclusion criteria] GreenSEDATION TYPE      1. Are you active on mychart? N    2. What insurance is in the chart? BCBS     3.   Ordering/Referring Provider: Ibrahima Casper MD    4. BMI   (If greater than 40 review exclusion criteria [PAC APPT IF [MAC] @ [facility])  29.8  [If yes, BMI OVER 40-EXTENDED PREP]      **(Sedation review/consideration needed)**  Do you have a legal guardian or Medical Power of    and/or are you able to give consent for your medical care?     Y SELF    5. Have you had a positive covid test in the last 90 days?   N -     6.  Are you currently on dialysis?   N [ If yes, G-PREP & HOSPITAL setting ONLY]     7.  Do you have chronic kidney disease?  N [ If yes, G-PREP ]    8.   Do you have a diagnosis of diabetes?   N   [ If yes, G-PREP ]    9.  On a regular basis do you go 3-5 days between bowel movements?   N   [ If yes, EXTENDED PREP]    10.  Are you taking any prescription pain medications on a routine schedule?    N -  [ If yes, EXTENDED PREP] [If yes, MAC]      11.   Do you have any chemical dependencies such as alcohol, street drugs, or methadone?    N [If yes, MAC]    12.   Do you have any history of post-traumatic stress syndrome, severe anxiety or history of psychosis?    N  [If yes, MAC]    13.  [FEMALES] Are you currently pregnant?     If yes, how many weeks?       Respiratory/Heart Screening:  [If yes to any of the following HOSPITAL setting only]     14. Do you have Pulmonary Hypertension [Lungs]?   N       15. Do you have UNCONTROLLED asthma?   N     16.  Do you use daily home oxygen?  N      17. Do you have mod to severe Obstructive Sleep Apnea?         (OKAY @  [facility]  [facility]  [facility]  [facility]  [facility] - if pt is not on OXYGEN)  N      18.   Have you had a heart or lung transplant?   N      19.   Have you had a stroke or Transient ischemic attack (TIA - aka  mini stroke ) within 6 months?  (If yes, please review exclusion  criteria)  N     20.   In the past 6 months, have you had any heart related issues including cardiomyopathy or heart attack?   N           If yes, did it require cardiac stenting or other implantable device?   N      21.   Do you have any implantable devices in your body (pacemaker, defib, LVAD)? (If yes, please review exclusion criteria)  N   22.  Do you take the medication Phentermine?  NO        23. Do you take nitroglycerin?   N           If yes, how often?   (if yes, HOSPITAL setting ONLY)    24.  Are you currently taking any blood thinners?    [If yes, INFORM patient to follw  w/ ORDERING PROVIDER FOR BRIDGING INSTRUCTIONS]     N    25.   Do you transfer independently?                (If NO, please HOSPITAL setting ONLY)  Y    26.   Preferred LOCAL Pharmacy for Pre Prescription:      [facility]      Scheduling Details  (Please ask for phone number if not scheduled by patient)      Caller : Jer  Date of Procedure: 12/1/22  Surgeon: Refugio  Location:         Sedation Type: MAC l   Conscious Sedation- Needs  for 6 hours after the procedure  MAC/General-Needs  for 24 hours after procedure    n :[Pre-op Required] at [facility]  [facility] and OR for MAC sedation   (advise patient they will need a pre-op WITH IN 30 DAYS of procedure date)     Type of Procedure Scheduled:   Lower Endoscopy [Colonoscopy]    Which Colonoscopy Prep was Sent?:   [facility] ALBRIGHT CF PATIENTS & [facility] PATIENTS NEEDS EXTENDED PREP       Informed patient they will need an adult  y  Cannot take any type of public or medical transportation alone    Pre-Procedure [facility]id test [facility] or Externally: at home test  **INFORMED OF HOME TESTING & LAB OPTION**        Confirmed Nurse will call to complete assessment y    Additional comments:

## 2022-09-12 RX ORDER — BISACODYL 5 MG
TABLET, DELAYED RELEASE (ENTERIC COATED) ORAL
Qty: 4 TABLET | Refills: 0 | Status: SHIPPED | OUTPATIENT
Start: 2022-09-12 | End: 2023-07-13

## 2022-11-15 DIAGNOSIS — I10 ESSENTIAL HYPERTENSION: ICD-10-CM

## 2022-11-15 DIAGNOSIS — E03.9 HYPOTHYROIDISM, UNSPECIFIED TYPE: ICD-10-CM

## 2022-11-17 NOTE — TELEPHONE ENCOUNTER
"Requested Prescriptions   Pending Prescriptions Disp Refills    amLODIPine (NORVASC) 5 MG tablet [Pharmacy Med Name: amlodipine 5 mg tablet] 90 tablet 4     Sig: TAKE ONE TABLET (5 MG) BY MOUTH DAILY       Calcium Channel Blockers Protocol  Failed - 11/15/2022  9:30 AM        Failed - Blood pressure under 140/90 in past 12 months     BP Readings from Last 3 Encounters:   10/15/21 128/80   10/08/21 132/86   10/01/21 (!) 155/106                 Failed - Normal serum creatinine on file in past 12 months     Recent Labs   Lab Test 10/08/21  0941   CR 1.06       Ok to refill medication if creatinine is low          Passed - Recent (12 mo) or future (30 days) visit within the authorizing provider's specialty     Patient has had an office visit with the authorizing provider or a provider within the authorizing providers department within the previous 12 mos or has a future within next 30 days. See \"Patient Info\" tab in inbasket, or \"Choose Columns\" in Meds & Orders section of the refill encounter.              Passed - Medication is active on med list        Passed - Patient is age 18 or older          levothyroxine (SYNTHROID/LEVOTHROID) 88 MCG tablet [Pharmacy Med Name: levothyroxine 88 mcg tablet] 90 tablet 4     Sig: TAKE ONE TABLET (88 MCG) BY MOUTH DAILY       Thyroid Protocol Failed - 11/15/2022  9:30 AM        Failed - Normal TSH on file in past 12 months     Recent Labs   Lab Test 10/08/21  0941   TSH 3.18              Passed - Patient is 12 years or older        Passed - Recent (12 mo) or future (30 days) visit within the authorizing provider's specialty     Patient has had an office visit with the authorizing provider or a provider within the authorizing providers department within the previous 12 mos or has a future within next 30 days. See \"Patient Info\" tab in inbasket, or \"Choose Columns\" in Meds & Orders section of the refill encounter.              Passed - Medication is active on med list       "       Sima Douglass, BSN, RN

## 2022-11-18 ENCOUNTER — TELEPHONE (OUTPATIENT)
Dept: FAMILY MEDICINE | Facility: CLINIC | Age: 57
End: 2022-11-18

## 2022-11-18 RX ORDER — LEVOTHYROXINE SODIUM 88 UG/1
TABLET ORAL
Qty: 90 TABLET | Refills: 0 | Status: SHIPPED | OUTPATIENT
Start: 2022-11-18 | End: 2023-01-23

## 2022-11-18 RX ORDER — AMLODIPINE BESYLATE 5 MG/1
TABLET ORAL
Qty: 90 TABLET | Refills: 0 | Status: SHIPPED | OUTPATIENT
Start: 2022-11-18 | End: 2023-01-23

## 2022-11-18 NOTE — TELEPHONE ENCOUNTER
Patient needs to come in once a year to review all of his medications and have his blood pressure screened and we will discuss all of the labs he needs to have done as part of his medications and any other screenings he is due to have completed.    He can schedule this as a preventative visit, his insurance will cover this at no cost to him and I gave him a 90 day supply to allow him plenty of time to get this scheduled.    Will have staff notify patient.    Ibrahima Casper MD

## 2022-11-18 NOTE — TELEPHONE ENCOUNTER
Patient was notified of his medication being sent to the pharmacy for 90 day supply on both. Patient is questioning if after the 90 days is he to have labs again? Writer tried to schedule him an office visit and patient declined, stating he is doing fine and would prefer to just do labs if that is what is needed. Please advise. Gricel Gonsalves LPN

## 2022-11-18 NOTE — LETTER
[facility] [address] [address]  Phone: [phone]  Fax: [phone]        November 23, 2022      Dom Durham                                                                                                                                [address] [facility] [address]            Dear Mr. Durham,    We are concerned about your health care.  We recently provided you with a medication refill.  Many medications require routine follow-up with your Doctor.      At this time we ask that you schedule a visit with your primary care team         Thank you   [facility] Care Team  [phone]

## 2022-11-18 NOTE — TELEPHONE ENCOUNTER
Medication refilled x1.  Needs appointment for further refills.  Will have staff notify patient.    Ibrahima Casper MD

## 2022-11-18 NOTE — TELEPHONE ENCOUNTER
Reason for Call:  Other call back/lab appt     Detailed comments: Pt calling to request Levothyroxine 88mcg and Amlodipine 5mg. Pls send to:  [facility] [place] - [address]  Pt also inquiring if he needs lab- if Y, pt would prefer appt before or on 12/1. (scheduled for colonoscopy) that day. Pls call and advise. Ty/kt    Phone Number Patient can be reached at: Home number on file [phone] (home)    Best Time: Anytime    Can we leave a detailed message on this number? Not Applicable    Call taken on 11/18/2022 at 4:32 PM by Yen Appiah

## 2022-11-25 RX ORDER — BISACODYL 5 MG/1
5 TABLET, DELAYED RELEASE ORAL ONCE
Qty: 4 TABLET | Refills: 0 | Status: SHIPPED | OUTPATIENT
Start: 2022-11-25 | End: 2022-11-25

## 2022-11-29 ENCOUNTER — TELEPHONE (OUTPATIENT)
Dept: GASTROENTEROLOGY | Facility: CLINIC | Age: 57
End: 2022-11-29

## 2022-11-29 NOTE — TELEPHONE ENCOUNTER
Caller: Jigar    Procedure: Colon    Date, Location, and Surgeon of Procedure Cancelled: 12/1/22  Shannon    Ordering Provider:Tremayne    Reason for cancel (please be detailed, any staff messages or encounters to note?): Patient sick        Rescheduled: Yes     If rescheduled:    Date: 2/9/23   Location:    Prep Resent: No changes (changes to prep?)   Covid Test Rescheduled: No   Note any change or update to original order/sedation: None

## 2023-01-23 ENCOUNTER — OFFICE VISIT (OUTPATIENT)
Dept: FAMILY MEDICINE | Facility: CLINIC | Age: 58
End: 2023-01-23
Payer: COMMERCIAL

## 2023-01-23 ENCOUNTER — TELEPHONE (OUTPATIENT)
Dept: FAMILY MEDICINE | Facility: CLINIC | Age: 58
End: 2023-01-23

## 2023-01-23 VITALS
BODY MASS INDEX: 29.82 KG/M2 | SYSTOLIC BLOOD PRESSURE: 144 MMHG | DIASTOLIC BLOOD PRESSURE: 90 MMHG | OXYGEN SATURATION: 98 % | TEMPERATURE: 96.2 F | HEART RATE: 64 BPM | HEIGHT: 73 IN | WEIGHT: 225 LBS

## 2023-01-23 DIAGNOSIS — K76.0 FATTY LIVER: ICD-10-CM

## 2023-01-23 DIAGNOSIS — E03.9 HYPOTHYROIDISM, UNSPECIFIED TYPE: ICD-10-CM

## 2023-01-23 DIAGNOSIS — I10 ESSENTIAL HYPERTENSION: Primary | ICD-10-CM

## 2023-01-23 LAB
ALBUMIN SERPL BCG-MCNC: 4.6 G/DL (ref 3.5–5.2)
ALP SERPL-CCNC: 89 U/L (ref 40–129)
ALT SERPL W P-5'-P-CCNC: 123 U/L (ref 10–50)
ANION GAP SERPL CALCULATED.3IONS-SCNC: 10 MMOL/L (ref 7–15)
AST SERPL W P-5'-P-CCNC: 88 U/L (ref 10–50)
BILIRUB SERPL-MCNC: 0.9 MG/DL
BUN SERPL-MCNC: 10 MG/DL (ref 8–23)
CALCIUM SERPL-MCNC: 9.6 MG/DL (ref 8.6–10)
CHLORIDE SERPL-SCNC: 105 MMOL/L (ref 98–107)
CHOLEST SERPL-MCNC: 198 MG/DL
CREAT SERPL-MCNC: 0.87 MG/DL (ref 0.67–1.17)
CREAT UR-MCNC: 240.8 MG/DL
DEPRECATED HCO3 PLAS-SCNC: 26 MMOL/L (ref 22–29)
GFR SERPL CREATININE-BSD FRML MDRD: >90 ML/MIN/1.73M2
GLUCOSE SERPL-MCNC: 95 MG/DL (ref 70–99)
HDLC SERPL-MCNC: 47 MG/DL
LDLC SERPL CALC-MCNC: 133 MG/DL
MICROALBUMIN UR-MCNC: 15.5 MG/L
MICROALBUMIN/CREAT UR: 6.44 MG/G CR (ref 0–17)
NONHDLC SERPL-MCNC: 151 MG/DL
POTASSIUM SERPL-SCNC: 4.3 MMOL/L (ref 3.4–5.3)
PROT SERPL-MCNC: 7.1 G/DL (ref 6.4–8.3)
SODIUM SERPL-SCNC: 141 MMOL/L (ref 136–145)
TRIGL SERPL-MCNC: 90 MG/DL
TSH SERPL DL<=0.005 MIU/L-ACNC: 2.08 UIU/ML (ref 0.3–4.2)

## 2023-01-23 PROCEDURE — 99214 OFFICE O/P EST MOD 30 MIN: CPT | Performed by: FAMILY MEDICINE

## 2023-01-23 PROCEDURE — 84443 ASSAY THYROID STIM HORMONE: CPT | Performed by: FAMILY MEDICINE

## 2023-01-23 PROCEDURE — 36415 COLL VENOUS BLD VENIPUNCTURE: CPT | Performed by: FAMILY MEDICINE

## 2023-01-23 PROCEDURE — 82043 UR ALBUMIN QUANTITATIVE: CPT | Performed by: FAMILY MEDICINE

## 2023-01-23 PROCEDURE — 80053 COMPREHEN METABOLIC PANEL: CPT | Performed by: FAMILY MEDICINE

## 2023-01-23 PROCEDURE — 82570 ASSAY OF URINE CREATININE: CPT | Performed by: FAMILY MEDICINE

## 2023-01-23 PROCEDURE — 80061 LIPID PANEL: CPT | Performed by: FAMILY MEDICINE

## 2023-01-23 RX ORDER — AMLODIPINE BESYLATE 5 MG/1
5 TABLET ORAL DAILY
Qty: 90 TABLET | Refills: 4 | Status: SHIPPED | OUTPATIENT
Start: 2023-01-23 | End: 2023-02-21

## 2023-01-23 RX ORDER — LEVOTHYROXINE SODIUM 88 UG/1
88 TABLET ORAL DAILY
Qty: 90 TABLET | Refills: 4 | Status: SHIPPED | OUTPATIENT
Start: 2023-01-23 | End: 2024-02-26

## 2023-01-23 ASSESSMENT — PAIN SCALES - GENERAL: PAINLEVEL: NO PAIN (0)

## 2023-01-23 NOTE — LETTER
[address] [address]  [phone]        February 8, 2023    Dom Durham  [address]  [facility] [address]          Dear Dom,    We have attempted to reach you by phone as well. Dr. Casper is wondering how your blood pressure readings have been at home. Please call the clinic at [phone] option 2 to update the clinic.     Sincerely,        Ibrahima Casper MD

## 2023-01-23 NOTE — TELEPHONE ENCOUNTER
Per Dr. Casper, please contact patient to see how his blood pressure readings have been at home and route back to him.    Zohreh Taylor, CMA

## 2023-01-23 NOTE — PROGRESS NOTES
"  Assessment & Plan     ASSESSMENT/ORDERS:    ICD-10-CM    1. Essential hypertension  I10 Albumin Random Urine Quantitative with Creat Ratio     Lipid panel reflex to direct LDL Fasting     Comprehensive metabolic panel (BMP + Alb, Alk Phos, ALT, AST, Total. Bili, TP)     amLODIPine (NORVASC) 5 MG tablet      2. Hypothyroidism, unspecified type  E03.9 TSH     levothyroxine (SYNTHROID/LEVOTHROID) 88 MCG tablet      3. Fatty liver  K76.0 Comprehensive metabolic panel (BMP + Alb, Alk Phos, ALT, AST, Total. Bili, TP)        PLAN:  1.  Blood pressure elevated, he will check blood pressure at home and get back to us.  Will have staff check on him in 2 weeks.  2.  Medications refilled for all other above noted stable conditions.  Labs ordered as noted above.  May need to consider ARB if albuminuria present.             BMI:   Estimated body mass index is 30.1 kg/m  as calculated from the following:    Height as of this encounter: 1.842 m (6' 0.5\").    Weight as of this encounter: 102.1 kg (225 lb).   Weight management plan: Discussed healthy diet and exercise guidelines        Return in about 1 year (around 1/23/2024) for next preventative visit (Physical).    Ibrahima Casper MD  [facility] CARRIE Kimball is a 57 year old presenting for the following health issues:  Recheck Medication (Lab work as well)      History of Present Illness       Headaches:   Since the patient's last clinic visit, headaches are: no change  The patient is getting headaches:  Everyday  He is able to do normal daily activities when he has a migraine.  The patient is taking the following rescue/relief medications:  Ibuprofen (Advil, Motrin)   Patient states \"The relief is inconsistent\" from the rescue/relief medications.   The patient is taking the following medications to prevent migraines:  No medications to prevent migraines  In the past 4 weeks, the patient has gone to an Urgent Care or Emergency Room 0 times " "times due to headaches.    Reason for visit:  Meds    He eats 2-3 servings of fruits and vegetables daily.He consumes 3 sweetened beverage(s) daily.He exercises with enough effort to increase his heart rate 10 to 19 minutes per day.  He exercises with enough effort to increase his heart rate 7 days per week.   He is taking medications regularly.           No concerns, blood pressure has been okay at home.  Elevated here today.      History of thyroid disease, on levothyroxine.    Review of Systems         Objective    BP (!) 144/90   Pulse 64   Temp (!) 96.2  F (35.7  C) (Tympanic)   Ht 1.842 m (6' 0.5\")   Wt 102.1 kg (225 lb)   SpO2 98%   BMI 30.10 kg/m    Body mass index is 30.1 kg/m .  Physical Exam  Constitutional:       Appearance: He is well-developed. He is obese.   Cardiovascular:      Rate and Rhythm: Normal rate and regular rhythm.      Heart sounds: Normal heart sounds, S1 normal and S2 normal. No murmur heard.  Pulmonary:      Effort: Pulmonary effort is normal. No respiratory distress.      Breath sounds: Normal breath sounds. No wheezing, rhonchi or rales.   Neurological:      Mental Status: He is alert.                            "

## 2023-01-25 ENCOUNTER — TELEPHONE (OUTPATIENT)
Dept: FAMILY MEDICINE | Facility: CLINIC | Age: 58
End: 2023-01-25
Payer: COMMERCIAL

## 2023-01-25 NOTE — TELEPHONE ENCOUNTER
We have attempted to reach patient by phone, unable to leave message line was busy. Please try again later.     Celine Crawford MA

## 2023-01-25 NOTE — TELEPHONE ENCOUNTER
"----- Message from Ibrahima Casper MD sent at 1/24/2023 10:04 PM CST -----  Will have staff call patient with message regarding recent results:  \"Test results indicate liver function tests are elevated and I recommend he see a liver specialist to evaluate him further for monitoring his nonalcoholic fatty liver disease.  The rest of his labs were good.\"    Ibrahima Casper MD   "

## 2023-01-25 NOTE — RESULT ENCOUNTER NOTE
"Will have staff call patient with message regarding recent results:  \"Test results indicate liver function tests are elevated and I recommend he see a liver specialist to evaluate him further for monitoring his nonalcoholic fatty liver disease.  The rest of his labs were good.\"    Ibrahima Casper MD "

## 2023-01-25 NOTE — LETTER
"January 27, 2023      Jigar Durham  [address]  [facility] [place] [address]        Dear ,    We are writing to inform you of your test results.    \"Test results indicate liver function tests are elevated and I recommend he see a liver specialist to evaluate him further for monitoring his nonalcoholic fatty liver disease.  The rest of his labs were good.\"     Ibrahima Casper MD     Resulted Orders   Albumin Random Urine Quantitative with Creat Ratio   Result Value Ref Range    Albumin Urine mg/L 15.5 mg/L      Comment:      The reference ranges have not been established in urine albumin. The results should be integrated into the clinical context for interpretation.    Albumin Urine mg/g Cr 6.44 0.00 - 17.00 mg/g Cr      Comment:      Microalbuminuria is defined as an albumin:creatinine ratio of 17 to 299 for males and 25 to 299 for females. A ratio of albumin:creatinine of 300 or higher is indicative of overt proteinuria.  Due to biologic variability, positive results should be confirmed by a second, first-morning random or 24-hour timed urine specimen. If there is discrepancy, a third specimen is recommended. When 2 out of 3 results are in the microalbuminuria range, this is evidence for incipient nephropathy and warrants increased efforts at glucose control, blood pressure control, and institution of therapy with an angiotensin-converting-enzyme (ACE) inhibitor (if the patient can tolerate it).      Creatinine Urine mg/dL 240.8 mg/dL      Comment:      The reference ranges have not been established in urine creatinine. The results should be integrated into the clinical context for interpretation.   Lipid panel reflex to direct LDL Fasting   Result Value Ref Range    Cholesterol 198 <200 mg/dL    Triglycerides 90 <150 mg/dL    Direct Measure HDL 47 >=40 mg/dL    LDL Cholesterol Calculated 133 (H) <=100 mg/dL    Non HDL Cholesterol 151 (H) <130 mg/dL    Narrative    Cholesterol  Desirable:  <200 " mg/dL    Triglycerides  Normal:  Less than 150 mg/dL  Borderline High:  150-199 mg/dL  High:  200-499 mg/dL  Very High:  Greater than or equal to 500 mg/dL    Direct Measure HDL  Female:  Greater than or equal to 50 mg/dL   Male:  Greater than or equal to 40 mg/dL    LDL Cholesterol  Desirable:  <100mg/dL  Above Desirable:  100-129 mg/dL   Borderline High:  130-159 mg/dL   High:  160-189 mg/dL   Very High:  >= 190 mg/dL    Non HDL Cholesterol  Desirable:  130 mg/dL  Above Desirable:  130-159 mg/dL  Borderline High:  160-189 mg/dL  High:  190-219 mg/dL  Very High:  Greater than or equal to 220 mg/dL   TSH   Result Value Ref Range    TSH 2.08 0.30 - 4.20 uIU/mL   Comprehensive metabolic panel (BMP + Alb, Alk Phos, ALT, AST, Total. Bili, TP)   Result Value Ref Range    Sodium 141 136 - 145 mmol/L    Potassium 4.3 3.4 - 5.3 mmol/L    Chloride 105 98 - 107 mmol/L    Carbon Dioxide (CO2) 26 22 - 29 mmol/L    Anion Gap 10 7 - 15 mmol/L    Urea Nitrogen 10.0 8.0 - 23.0 mg/dL    Creatinine 0.87 0.67 - 1.17 mg/dL    Calcium 9.6 8.6 - 10.0 mg/dL    Glucose 95 70 - 99 mg/dL    Alkaline Phosphatase 89 40 - 129 U/L    AST 88 (H) 10 - 50 U/L     (H) 10 - 50 U/L    Protein Total 7.1 6.4 - 8.3 g/dL    Albumin 4.6 3.5 - 5.2 g/dL    Bilirubin Total 0.9 <=1.2 mg/dL    GFR Estimate >90 >60 mL/min/1.73m2      Comment:      Effective December 21, 2021 eGFRcr in adults is calculated using the 2021 CKD-EPI creatinine equation which includes age and gender (Kate et al., NEJM, DOI: 10.1056/JVOAwn6599456)       If you have any questions or concerns, please call the clinic at the number listed above.

## 2023-01-27 NOTE — TELEPHONE ENCOUNTER
2nd attempt, unable to reach patient, letter sent with results.     Closing encounter.   Celine Crawford MA

## 2023-02-07 NOTE — TELEPHONE ENCOUNTER
Attempted to reach patient, unable to leave message. Please relay below and gather information needed.     Celine Crawford MA

## 2023-02-08 NOTE — TELEPHONE ENCOUNTER
2nd attempt, unable to reach patient, Routing to provider to advise what to do for next step patient does not have mychart. There is only one number listed and unable to get through.     Would you like us to send a letter?     Celine Crawford MA

## 2023-02-08 NOTE — TELEPHONE ENCOUNTER
Send him a letter and postpone this message for 2 weeks as a reminder to try and call him back again.    Ibrahima Casper MD

## 2023-02-09 ENCOUNTER — HOSPITAL ENCOUNTER (OUTPATIENT)
Facility: CLINIC | Age: 58
Discharge: HOME | End: 2023-02-09
Attending: SURGERY | Admitting: SURGERY
Payer: COMMERCIAL

## 2023-02-09 ENCOUNTER — TELEPHONE (OUTPATIENT)
Dept: FAMILY MEDICINE | Facility: CLINIC | Age: 58
End: 2023-02-09
Payer: COMMERCIAL

## 2023-02-09 ENCOUNTER — ANESTHESIA (OUTPATIENT)
Dept: GASTROENTEROLOGY | Facility: CLINIC | Age: 58
End: 2023-02-09
Payer: COMMERCIAL

## 2023-02-09 ENCOUNTER — ANESTHESIA EVENT (OUTPATIENT)
Dept: GASTROENTEROLOGY | Facility: CLINIC | Age: 58
End: 2023-02-09
Payer: COMMERCIAL

## 2023-02-09 VITALS
OXYGEN SATURATION: 96 % | TEMPERATURE: 98.2 F | HEART RATE: 64 BPM | SYSTOLIC BLOOD PRESSURE: 139 MMHG | RESPIRATION RATE: 20 BRPM | DIASTOLIC BLOOD PRESSURE: 99 MMHG

## 2023-02-09 DIAGNOSIS — Z12.11 SPECIAL SCREENING FOR MALIGNANT NEOPLASMS, COLON: Primary | ICD-10-CM

## 2023-02-09 LAB — COLONOSCOPY: NORMAL

## 2023-02-09 PROCEDURE — 370N000017 HC ANESTHESIA TECHNICAL FEE, PER MIN: Performed by: SURGERY

## 2023-02-09 PROCEDURE — G0105 COLORECTAL SCRN; HI RISK IND: HCPCS | Performed by: SURGERY

## 2023-02-09 PROCEDURE — 258N000003 HC RX IP 258 OP 636: Performed by: NURSE ANESTHETIST, CERTIFIED REGISTERED

## 2023-02-09 PROCEDURE — 250N000009 HC RX 250: Performed by: NURSE ANESTHETIST, CERTIFIED REGISTERED

## 2023-02-09 PROCEDURE — 250N000011 HC RX IP 250 OP 636: Performed by: NURSE ANESTHETIST, CERTIFIED REGISTERED

## 2023-02-09 PROCEDURE — 45378 DIAGNOSTIC COLONOSCOPY: CPT | Performed by: SURGERY

## 2023-02-09 RX ORDER — NALOXONE HYDROCHLORIDE 0.4 MG/ML
0.2 INJECTION, SOLUTION INTRAMUSCULAR; INTRAVENOUS; SUBCUTANEOUS
Status: DISCONTINUED | OUTPATIENT
Start: 2023-02-09 | End: 2023-02-09 | Stop reason: HOSPADM

## 2023-02-09 RX ORDER — LIDOCAINE HYDROCHLORIDE 20 MG/ML
INJECTION, SOLUTION INFILTRATION; PERINEURAL PRN
Status: DISCONTINUED | OUTPATIENT
Start: 2023-02-09 | End: 2023-02-09

## 2023-02-09 RX ORDER — ONDANSETRON 4 MG/1
4 TABLET, ORALLY DISINTEGRATING ORAL EVERY 6 HOURS PRN
Status: DISCONTINUED | OUTPATIENT
Start: 2023-02-09 | End: 2023-02-09 | Stop reason: HOSPADM

## 2023-02-09 RX ORDER — ONDANSETRON 2 MG/ML
4 INJECTION INTRAMUSCULAR; INTRAVENOUS EVERY 6 HOURS PRN
Status: DISCONTINUED | OUTPATIENT
Start: 2023-02-09 | End: 2023-02-09 | Stop reason: HOSPADM

## 2023-02-09 RX ORDER — FLUMAZENIL 0.1 MG/ML
0.2 INJECTION, SOLUTION INTRAVENOUS
Status: DISCONTINUED | OUTPATIENT
Start: 2023-02-09 | End: 2023-02-09 | Stop reason: HOSPADM

## 2023-02-09 RX ORDER — SODIUM CHLORIDE, SODIUM LACTATE, POTASSIUM CHLORIDE, CALCIUM CHLORIDE 600; 310; 30; 20 MG/100ML; MG/100ML; MG/100ML; MG/100ML
INJECTION, SOLUTION INTRAVENOUS CONTINUOUS
Status: DISCONTINUED | OUTPATIENT
Start: 2023-02-09 | End: 2023-02-09 | Stop reason: HOSPADM

## 2023-02-09 RX ORDER — PROPOFOL 10 MG/ML
INJECTION, EMULSION INTRAVENOUS CONTINUOUS PRN
Status: DISCONTINUED | OUTPATIENT
Start: 2023-02-09 | End: 2023-02-09

## 2023-02-09 RX ORDER — PROCHLORPERAZINE MALEATE 5 MG
10 TABLET ORAL EVERY 6 HOURS PRN
Status: DISCONTINUED | OUTPATIENT
Start: 2023-02-09 | End: 2023-02-09 | Stop reason: HOSPADM

## 2023-02-09 RX ORDER — LIDOCAINE 40 MG/G
CREAM TOPICAL
Status: DISCONTINUED | OUTPATIENT
Start: 2023-02-09 | End: 2023-02-09 | Stop reason: HOSPADM

## 2023-02-09 RX ORDER — ONDANSETRON 2 MG/ML
4 INJECTION INTRAMUSCULAR; INTRAVENOUS
Status: DISCONTINUED | OUTPATIENT
Start: 2023-02-09 | End: 2023-02-09 | Stop reason: HOSPADM

## 2023-02-09 RX ORDER — NALOXONE HYDROCHLORIDE 0.4 MG/ML
0.4 INJECTION, SOLUTION INTRAMUSCULAR; INTRAVENOUS; SUBCUTANEOUS
Status: DISCONTINUED | OUTPATIENT
Start: 2023-02-09 | End: 2023-02-09 | Stop reason: HOSPADM

## 2023-02-09 RX ORDER — PROPOFOL 10 MG/ML
INJECTION, EMULSION INTRAVENOUS PRN
Status: DISCONTINUED | OUTPATIENT
Start: 2023-02-09 | End: 2023-02-09

## 2023-02-09 RX ADMIN — LIDOCAINE HYDROCHLORIDE 50 MG: 20 INJECTION, SOLUTION INFILTRATION; PERINEURAL at 07:33

## 2023-02-09 RX ADMIN — PROPOFOL 50 MG: 10 INJECTION, EMULSION INTRAVENOUS at 07:33

## 2023-02-09 RX ADMIN — SODIUM CHLORIDE, POTASSIUM CHLORIDE, SODIUM LACTATE AND CALCIUM CHLORIDE: 600; 310; 30; 20 INJECTION, SOLUTION INTRAVENOUS at 07:01

## 2023-02-09 RX ADMIN — PROPOFOL 20 MG: 10 INJECTION, EMULSION INTRAVENOUS at 07:35

## 2023-02-09 RX ADMIN — PROPOFOL 30 MG: 10 INJECTION, EMULSION INTRAVENOUS at 07:34

## 2023-02-09 RX ADMIN — PROPOFOL 200 MCG/KG/MIN: 10 INJECTION, EMULSION INTRAVENOUS at 07:33

## 2023-02-09 ASSESSMENT — LIFESTYLE VARIABLES: TOBACCO_USE: 0

## 2023-02-09 ASSESSMENT — ACTIVITIES OF DAILY LIVING (ADL): ADLS_ACUITY_SCORE: 35

## 2023-02-09 NOTE — TELEPHONE ENCOUNTER
----- Message from Sarah Jacques RN sent at 2/9/2023  8:15 AM CST -----  Pt here for colonoscopy today. DBP .  Pt reports his dBP also running  at home since his visit with you on 1/23.  This is a courtesy update on behalf of Eileen

## 2023-02-09 NOTE — ANESTHESIA CARE TRANSFER NOTE
Patient: Dom Durham    Procedure: Procedure(s):  COLONOSCOPY       Diagnosis: Hx of colonic polyp [Z86.010]  Diagnosis Additional Information: No value filed.    Anesthesia Type:   MAC     Note:    Oropharynx: oropharynx clear of all foreign objects and spontaneously breathing  Level of Consciousness: awake  Oxygen Supplementation: room air    Independent Airway: airway patency satisfactory and stable  Dentition: dentition unchanged  Vital Signs Stable: post-procedure vital signs reviewed and stable  Report to RN Given: handoff report given  Patient transferred to: Phase II    Handoff Report: Identifed the Patient, Identified the Reponsible Provider, Reviewed the pertinent medical history, Discussed the surgical course, Reviewed Intra-OP anesthesia mangement and issues during anesthesia, Set expectations for post-procedure period and Allowed opportunity for questions and acknowledgement of understanding      Vitals:  Vitals Value Taken Time   /95 02/09/23 0759   Temp     Pulse 66 02/09/23 0759   Resp     SpO2 99 % 02/09/23 0802   Vitals shown include unvalidated device data.    Electronically Signed By: KASIE Abbott CRNA  February 9, 2023  8:10 AM

## 2023-02-09 NOTE — DISCHARGE INSTRUCTIONS
[facility]    Home Care Following Endoscopy    Dr. Huff      Activity:  You have just undergone an endoscopic procedure usually performed with conscious sedation.  Today you received Monitored anesthesia Care (MAC). Do not work or operate machinery (including a car) or drinkalcohol (including beer) or sign legal papers for at least 12 hours.    I encourage you to walk and attempt to pass this air as soon as possible.    Diet:  Return to the diet you were on before your procedure but eat lightly for the first 12-24 hours.  Drink plenty of water.  Resume any regular medications unless otherwise advised by your physician.     You had a CLEAN screen today.  Pain:  You may take Tylenol or Ibuprofen as needed for pain.  Expected Recovery:  You can expect some mild abdominal fullness and/or discomfort due to the air used to inflate your intestinal tract.      Call Your Physician if You Have:    After Colonoscopy:  Worsening persisting abdominal pain which is worse with activity.  Fevers (>101 degrees F), chills or shakes.  Passage of continued blood with bowel movements.     Any questions or concerns about your recovery, please call [phone] or after hours [phone] [facility] Nurse Advice Line (24 hr line).    Follow-up Care:     You should follow up with your Primary Provider as needed.

## 2023-02-09 NOTE — LETTER
[address] [address]  Phone: [phone]  Fax: [phone]        February 10, 2023      Dom Durham                                                                                                                                [address] [facility] [address]            Dear  Anjugulshan,    We have attempted to reach you but have been unsuccessful. Please call the clinic at your convenience to discuss elevated blood pressure and what medications need to be adjusted or added to get back to normal level. If you have any questions or concerns let us know.     Thank you   [facility] Care Team  [phone]

## 2023-02-09 NOTE — ANESTHESIA PREPROCEDURE EVALUATION
Anesthesia Pre-Procedure Evaluation    Patient: Dom Durham   MRN: [id number] : 1965        Procedure : Procedure(s):  COLONOSCOPY          Past Medical History:   Diagnosis Date     History of ankle fracture     5 times left, 2 times right     Hypertension      Thyroid dysfunction       Past Surgical History:   Procedure Laterality Date     ANKLE SURGERY       COLONOSCOPY N/A 6/10/2016    Procedure: COLONOSCOPY;  Surgeon: Jarek Chaudhari MD;  Location: [facility]     ESOPHAGOSCOPY, GASTROSCOPY, DUODENOSCOPY (EGD), COMBINED  2013    Procedure: COMBINED ESOPHAGOSCOPY, GASTROSCOPY, DUODENOSCOPY (EGD), BIOPSY SINGLE OR MULTIPLE;;  Surgeon: Timothy Jennings MD;  Location: [facility]     LAPAROSCOPY DIAGNOSTIC (GENERAL)  2013    Procedure: LAPAROSCOPY DIAGNOSTIC (GENERAL);  Diagnostic Laparoscopy;  Surgeon: Stu Arias MD;  Location: [facility]     SHOULDER SURGERY        Allergies   Allergen Reactions     Augmentin      Dilaudid [Hydromorphone]       Social History     Tobacco Use     Smoking status: Never     Smokeless tobacco: Never   Substance Use Topics     Alcohol use: Yes     Alcohol/week: 6.0 standard drinks     Types: 6 Shots of liquor per week      Wt Readings from Last 1 Encounters:   23 102.1 kg (225 lb)        Anesthesia Evaluation   Pt has had prior anesthetic.     No history of anesthetic complications       ROS/MED HX  ENT/Pulmonary:    (-) tobacco use   Neurologic:  - neg neurologic ROS     Cardiovascular:     (+) hypertension-----Previous cardiac testing   Echo: Date: Results:    Stress Test: Date: Results:    ECG Reviewed: Date: 13 Results:  NSR  Cath: Date: Results:      METS/Exercise Tolerance:     Hematologic:  - neg hematologic  ROS     Musculoskeletal:  - neg musculoskeletal ROS     GI/Hepatic:  - neg GI/hepatic ROS   (+) liver disease,  (-) GERD   Renal/Genitourinary:  - neg Renal ROS     Endo:     (+) thyroid problem, hypothyroidism,     Psychiatric/Substance  Use:  - neg psychiatric ROS     Infectious Disease:  - neg infectious disease ROS     Malignancy:  - neg malignancy ROS     Other:  - neg other ROS          Physical Exam    Airway        Mallampati: II   TM distance: > 3 FB   Neck ROM: full   Mouth opening: > 3 cm    Respiratory Devices and Support         Dental       (+) Modest Abnormalities - crowns, retainers, 1 or 2 missing teeth      Cardiovascular   cardiovascular exam normal       Rhythm and rate: regular and normal     Pulmonary   pulmonary exam normal        breath sounds clear to auscultation           OUTSIDE LABS:  CBC:   Lab Results   Component Value Date    WBC 9.0 10/08/2021    WBC 7.5 10/05/2017    HGB 16.0 10/08/2021    HGB 16.2 10/05/2017    HCT 47.0 10/08/2021    HCT 49.5 10/05/2017     10/08/2021     10/05/2017     BMP:   Lab Results   Component Value Date     01/23/2023     10/08/2021    POTASSIUM 4.3 01/23/2023    POTASSIUM 4.4 10/08/2021    CHLORIDE 105 01/23/2023    CHLORIDE 105 10/08/2021    CO2 26 01/23/2023    CO2 25 10/08/2021    BUN 10.0 01/23/2023    BUN 18 10/08/2021    CR 0.87 01/23/2023    CR 1.06 10/08/2021    GLC 95 01/23/2023     (H) 10/08/2021     COAGS: No results found for: PTT, INR, FIBR  POC: No results found for: BGM, HCG, HCGS  HEPATIC:   Lab Results   Component Value Date    ALBUMIN 4.6 01/23/2023    PROTTOTAL 7.1 01/23/2023     (H) 01/23/2023    AST 88 (H) 01/23/2023    ALKPHOS 89 01/23/2023    BILITOTAL 0.9 01/23/2023     OTHER:   Lab Results   Component Value Date    SHELIA 9.6 01/23/2023    LIPASE 154 08/24/2015    AMYLASE 84 08/24/2015    TSH 2.08 01/23/2023    T4 0.88 09/28/2015    CRP <2.9 10/08/2021       Anesthesia Plan    ASA Status:  2   NPO Status:  NPO Appropriate    Anesthesia Type: MAC.     - Reason for MAC: straight local not clinically adequate   Induction: Intravenous, Propofol.   Maintenance: TIVA.        Consents    Anesthesia Plan(s) and associated risks,  benefits, and realistic alternatives discussed. Questions answered and patient/representative(s) expressed understanding.    - Discussed:     - Discussed with:  Patient      - Extended Intubation/Ventilatory Support Discussed: No.      - Patient is DNR/DNI Status: No    Use of blood products discussed: No .     Postoperative Care       PONV prophylaxis: Background Propofol Infusion     Comments:    Other Comments: The risks and benefits of anesthesia, and the alternatives where applicable, have been discussed with the patient, and they wish to proceed.            KASIE Abbott CRNA

## 2023-02-09 NOTE — TELEPHONE ENCOUNTER
Needs a telephone, video, or office-based visit to discuss his elevated blood pressure and what medications we need to adjust/add to get back to normal level.    Will have staff notify patient and help schedule.    Ibrahima Casper MD

## 2023-02-09 NOTE — H&P
Patient seen for Endoscopy    HPI:  Patient is a 57 year old male with hx polyps. Not taking blood thinning medications. No MI or CVA history. No issues with previous sedation. No recent acute illness.    Review Of Systems    Skin: negative  Ears/Nose/Throat: negative  Respiratory: No shortness of breath, dyspnea on exertion, cough, or hemoptysis  Cardiovascular: negative  Gastrointestinal: negative  Genitourinary: negative  Musculoskeletal: negative  Neurologic: negative  Hematologic/Lymphatic/Immunologic: negative  Endocrine: negative      Past Medical History:   Diagnosis Date     History of ankle fracture     5 times left, 2 times right     Hypertension      Thyroid dysfunction        Past Surgical History:   Procedure Laterality Date     ANKLE SURGERY  1984     COLONOSCOPY N/A 6/10/2016    Procedure: COLONOSCOPY;  Surgeon: Jarek Chaudhari MD;  Location: [facility]     ESOPHAGOSCOPY, GASTROSCOPY, DUODENOSCOPY (EGD), COMBINED  5/24/2013    Procedure: COMBINED ESOPHAGOSCOPY, GASTROSCOPY, DUODENOSCOPY (EGD), BIOPSY SINGLE OR MULTIPLE;;  Surgeon: Timothy Jennings MD;  Location: [facility]     LAPAROSCOPY DIAGNOSTIC (GENERAL)  8/14/2013    Procedure: LAPAROSCOPY DIAGNOSTIC (GENERAL);  Diagnostic Laparoscopy;  Surgeon: Stu Arias MD;  Location: [facility]     SHOULDER SURGERY         Family History   Problem Relation Age of Onset     Hypertension Father      Prostate Cancer Father      Cancer Father         primary not known     Thyroid Disease Sister      Liver Disease Brother      Liver Disease Brother      Colon Cancer No family hx of        Social History     Socioeconomic History     Marital status:      Spouse name: Not on file     Number of children: Not on file     Years of education: Not on file     Highest education level: Not on file   Occupational History     Not on file   Tobacco Use     Smoking status: Never     Smokeless tobacco: Never   Vaping Use     Vaping Use: Never used   Substance and Sexual  Activity     Alcohol use: Yes     Alcohol/week: 6.0 standard drinks     Types: 6 Shots of liquor per week     Drug use: No     Sexual activity: Yes     Partners: Female   Other Topics Concern     Parent/sibling w/ CABG, MI or angioplasty before 65F 55M? Not Asked   Social History Narrative     Not on file     Social Determinants of Health     Financial Resource Strain: Not on file   Food Insecurity: Not on file   Transportation Needs: Not on file   Physical Activity: Not on file   Stress: Not on file   Social Connections: Not on file   Intimate Partner Violence: Not on file   Housing Stability: Not on file       Current Outpatient Medications   Medication Sig Dispense Refill     bisacodyl (DULCOLAX) 5 MG EC tablet Take 2 tablets at 3 pm the day before your procedure. If your procedure is before 11 am, take 2 additional tablets at 8 pm. If your procedure is after 11 am, take 2 additional tablets at 6 am. For additional instructions refer to your colonoscopy prep instructions. (Patient not taking: Reported on 1/23/2023) 4 tablet 0     polyethylene glycol (GOLYTELY) 236 g suspension The night before the exam at 6 pm drink an 8-ounce glass every 15 minutes until the jug is half empty. If you arrive before 11 AM: Drink the other half of the Golytely jug at 11 PM night before procedure. If you arrive after 11 AM: Drink the other half of the Golytely jug at 6 AM day of procedure. For additional instructions refer to your colonoscopy prep instructions. (Patient not taking: Reported on 1/23/2023) 4000 mL 0       Medications and history reviewed    Physical exam:  Vitals: BP (!) 150/105   Pulse 88   Temp 98.2  F (36.8  C) (Temporal)   Resp 16   SpO2 96%   BMI= There is no height or weight on file to calculate BMI.    Constitutional: Healthy, alert, non-distressed   Head: Normo-cephalic, atraumatic, no lesions, masses or tenderness   Cardiovascular: RRR, no new murmurs, +S1, +S2 heart sounds, no clicks, rubs or gallops    Respiratory: CTAB, no rales, rhonchi or wheezing, equal chest rise, good respiratory effort   Gastrointestinal: Soft, non-tender, non distended, no rebound rigidity or guarding, no masses or hernias palpated   : Deferred  Musculoskeletal: Moves all extremities, normal  strength, no deformities noted   Skin: No suspicious lesions or rashes   Psychiatric: Mentation appears normal, affect appropriate   Hematologic/Lymphatic/Immunologic: Normal cervical and supraclavicular lymph nodes   Patient able to get up on table without difficulty.    Labs show:  No results found for this or any previous visit (from the past 24 hour(s)).    Assessment: Endoscopy  Plan: Pt cleared for anesthesia for proposed procedure.    Vitaliy Huff, DO

## 2023-02-09 NOTE — ANESTHESIA POSTPROCEDURE EVALUATION
Patient: Dom Durham    Procedure: Procedure(s):  COLONOSCOPY       Anesthesia Type:  MAC    Note:  Disposition: Outpatient   Postop Pain Control: Uneventful            Sign Out: Well controlled pain   PONV: No   Neuro/Psych: Uneventful            Sign Out: Acceptable/Baseline neuro status   Airway/Respiratory: Uneventful            Sign Out: Acceptable/Baseline resp. status   CV/Hemodynamics: Uneventful            Sign Out: Acceptable CV status   Other NRE: NONE   DID A NON-ROUTINE EVENT OCCUR? No    Event details/Postop Comments:  Pt was happy with anesthesia care.  No complications.  I will follow up with the pt if needed.           Last vitals:  Vitals Value Taken Time   /95 02/09/23 0759   Temp     Pulse 66 02/09/23 0759   Resp     SpO2 99 % 02/09/23 0802   Vitals shown include unvalidated device data.    Electronically Signed By: KASIE Abbott CRNA  February 9, 2023  8:10 AM

## 2023-02-16 ENCOUNTER — TELEPHONE (OUTPATIENT)
Dept: FAMILY MEDICINE | Facility: CLINIC | Age: 58
End: 2023-02-16
Payer: COMMERCIAL

## 2023-02-16 DIAGNOSIS — I10 ESSENTIAL HYPERTENSION: ICD-10-CM

## 2023-02-16 NOTE — TELEPHONE ENCOUNTER
"Message sent from patient of his blood pressure readings. Did message patient on sending future messages through his [employer] to his care team and not to customer service.  Gricel Gonsalves LPN      From: Dom Durham \"Jigar\"   Sent: 2/15/2023   5:37 PM CST   To:  [facility]   Subject: Blood pressure                                     Topic: Tell us how we are doing.       I was told to join My chart and put in my blood pressure readings   Just now I have readings of 161/93  148/91  157/93 136/86 129/99  I did them one after another,  If this is not the way to send my DrJordan a message   Please educate me    Thank You    Jigar Durham        "

## 2023-02-18 ENCOUNTER — TELEPHONE (OUTPATIENT)
Dept: FAMILY MEDICINE | Facility: CLINIC | Age: 58
End: 2023-02-18
Payer: COMMERCIAL

## 2023-02-18 ENCOUNTER — TELEPHONE (OUTPATIENT)
Dept: NURSING | Facility: CLINIC | Age: 58
End: 2023-02-18
Payer: COMMERCIAL

## 2023-02-18 NOTE — TELEPHONE ENCOUNTER
Pt calling about a $400+ bill he received. Charge is related to 1/23 visit with PCP [facility]. Pt states he is not capable of paying this bill.    Pt states this was for his annual physical. FNA advised that he was charged for a visit re: hypertension.    Pt requests if clinic/PCP can change visit to annual physical instead of an acute visit.    Kindly call pt [phone].    Tayla Upton RN/[place] Nurse Advisor

## 2023-02-18 NOTE — TELEPHONE ENCOUNTER
Reason for Call:  Request for results:    Name of test or procedure: LAB results     Date of test of procedure: 01/23    Location of the test or procedure: [facility]    OK to leave the result message on voice mail or with a family member? N/A    Phone number Patient can be reached at:  Home number on file [phone] (home)    Additional comments: PT STATES THAT THE RESULTS WERE SENT OUT, BUT PAGE 2 AND 3 ARE BLANK    Call taken on 2/18/2023 at 4:58 PM by Jah Acosta

## 2023-02-20 NOTE — TELEPHONE ENCOUNTER
New copy of labs printed and placed up to be mailed to the patient. Gricel Gonsalves LPN     hand grasp, leg strength strong and equal bilaterally

## 2023-02-21 ENCOUNTER — MYC MEDICAL ADVICE (OUTPATIENT)
Dept: FAMILY MEDICINE | Facility: CLINIC | Age: 58
End: 2023-02-21
Payer: COMMERCIAL

## 2023-02-21 RX ORDER — AMLODIPINE BESYLATE 10 MG/1
10 TABLET ORAL DAILY
Qty: 30 TABLET | Refills: 1 | Status: SHIPPED | OUTPATIENT
Start: 2023-02-21 | End: 2023-07-11

## 2023-02-21 NOTE — TELEPHONE ENCOUNTER
He needs to increase amlodipine to 10 mg/day.  New prescription sent to his pharmacy reflecting dose change.    Will have staff notify patient and he will need to submit new blood pressure readings in the next 2 weeks.    Ibrahima Casper MD

## 2023-02-22 ENCOUNTER — MYC MEDICAL ADVICE (OUTPATIENT)
Dept: FAMILY MEDICINE | Facility: CLINIC | Age: 58
End: 2023-02-22
Payer: COMMERCIAL

## 2023-02-22 NOTE — TELEPHONE ENCOUNTER
Dr. Casper, sending as an FYI. It sounds like he has checked with an , I did also give him the billing number. Wasn't sure if there is any thoughts you had about this? Thank you!

## 2023-02-24 NOTE — TELEPHONE ENCOUNTER
Patient did not come to see me for a preventative visit, he came for management of his chronic disease.  I cannot bill for preventative care that did not happen, that would be fraudulent.    Ibrahima Casper MD

## 2023-04-16 ENCOUNTER — HEALTH MAINTENANCE LETTER (OUTPATIENT)
Age: 58
End: 2023-04-16

## 2023-07-08 DIAGNOSIS — I10 ESSENTIAL HYPERTENSION: ICD-10-CM

## 2023-07-11 RX ORDER — AMLODIPINE BESYLATE 10 MG/1
TABLET ORAL
Qty: 30 TABLET | Refills: 0 | Status: SHIPPED | OUTPATIENT
Start: 2023-07-11 | End: 2023-07-11

## 2023-07-11 RX ORDER — AMLODIPINE BESYLATE 10 MG/1
10 TABLET ORAL DAILY
Qty: 90 TABLET | Refills: 0 | Status: SHIPPED | OUTPATIENT
Start: 2023-07-11 | End: 2023-07-13

## 2023-07-11 NOTE — TELEPHONE ENCOUNTER
"Routing refill request to provider for review/approval because:    Requested Prescriptions   Pending Prescriptions Disp Refills    amLODIPine (NORVASC) 10 MG tablet [Pharmacy Med Name: amlodipine 10 mg tablet] 30 tablet 0     Sig: TAKE ONE TABLET BY MOUTH EVERY DAY       Calcium Channel Blockers Protocol  Failed - 7/8/2023 11:19 AM        Failed - Blood pressure under 140/90 in past 12 months     BP Readings from Last 3 Encounters:   02/09/23 (!) 139/99   01/23/23 (!) 144/90   10/15/21 128/80                 Passed - Recent (12 mo) or future (30 days) visit within the authorizing provider's specialty     Patient has had an office visit with the authorizing provider or a provider within the authorizing providers department within the previous 12 mos or has a future within next 30 days. See \"Patient Info\" tab in inbasket, or \"Choose Columns\" in Meds & Orders section of the refill encounter.              Passed - Medication is active on med list        Passed - Patient is age 18 or older        Passed - Normal serum creatinine on file in past 12 months     Recent Labs   Lab Test 01/23/23  0804   CR 0.87       Ok to refill medication if creatinine is low                     "

## 2023-07-11 NOTE — TELEPHONE ENCOUNTER
Amlodipine refilled as requested.  Would like to know if his blood pressure has gotten below 140/90.  If he has been checking at home and it has improved, can call us or he can schedule a nurse visit.    Once at goal I can add more refills.

## 2023-07-12 NOTE — TELEPHONE ENCOUNTER
Spoke with patient and informed of note below. Patient states that his recent blood pressure was 130/86 at home. Routing as TATIANNA.     Celine Crawford MA

## 2023-07-13 RX ORDER — AMLODIPINE BESYLATE 10 MG/1
10 TABLET ORAL DAILY
Qty: 90 TABLET | Refills: 1 | Status: SHIPPED | OUTPATIENT
Start: 2023-07-13 | End: 2023-09-07

## 2023-07-13 NOTE — TELEPHONE ENCOUNTER
Will have staff notify patient that 90 day supply and refill were sent.  Will plan to see him early January.    Ibrahima Casper MD

## 2023-07-13 NOTE — TELEPHONE ENCOUNTER
Spoke with patient and informed of note below. Patient understood but states that his bp this am when he woke up was elevated again at 150/100.       Celine Crawford MA

## 2023-07-14 NOTE — TELEPHONE ENCOUNTER
Would recommend telephone, video, or office-based visit to review hypertension management if blood pressures are continued to be elvevated consistently.  Will have staff notify patient.     Ibrahima Casper MD

## 2023-09-07 ENCOUNTER — ALLIED HEALTH/NURSE VISIT (OUTPATIENT)
Dept: FAMILY MEDICINE | Facility: CLINIC | Age: 58
End: 2023-09-07
Payer: COMMERCIAL

## 2023-09-07 VITALS — DIASTOLIC BLOOD PRESSURE: 82 MMHG | SYSTOLIC BLOOD PRESSURE: 110 MMHG

## 2023-09-07 DIAGNOSIS — I10 ESSENTIAL HYPERTENSION: Primary | ICD-10-CM

## 2023-09-07 PROCEDURE — 99207 PR NO CHARGE NURSE ONLY: CPT

## 2023-09-07 RX ORDER — AMLODIPINE BESYLATE 10 MG/1
10 TABLET ORAL DAILY
Qty: 90 TABLET | Refills: 1 | Status: ON HOLD | OUTPATIENT
Start: 2023-09-07 | End: 2023-09-15

## 2023-09-07 NOTE — PROGRESS NOTES
Medication refilled.  Follow-up in 6 months with me for recheck and annual labs.    Will have staff notify patient.    Ibrahima Casper MD

## 2023-09-07 NOTE — PROGRESS NOTES
Dom Durham is a 58 year old patient who comes in today for a Blood Pressure check.  Initial BP:  /82      Data Unavailable  Disposition: follow-up as previously indicated by provider and results routed to provider  patient is out of meds tomorrow. He stated he needed to do this appt to get a refill?    Isadora Ralph MA 9/7/2023

## 2023-09-08 NOTE — PROGRESS NOTES
MA called patient and notified providers message. We also scheduled an appoint in 6 months.       Rosio Guillory MA

## 2023-09-13 ENCOUNTER — APPOINTMENT (OUTPATIENT)
Dept: CT IMAGING | Facility: CLINIC | Age: 58
End: 2023-09-13
Attending: FAMILY MEDICINE
Payer: COMMERCIAL

## 2023-09-13 ENCOUNTER — HOSPITAL ENCOUNTER (EMERGENCY)
Facility: CLINIC | Age: 58
Discharge: ANOTHER HOSPITAL | End: 2023-09-13
Attending: FAMILY MEDICINE | Admitting: FAMILY MEDICINE
Payer: COMMERCIAL

## 2023-09-13 ENCOUNTER — APPOINTMENT (OUTPATIENT)
Dept: GENERAL RADIOLOGY | Facility: CLINIC | Age: 58
End: 2023-09-13
Attending: FAMILY MEDICINE
Payer: COMMERCIAL

## 2023-09-13 ENCOUNTER — NURSE TRIAGE (OUTPATIENT)
Dept: FAMILY MEDICINE | Facility: CLINIC | Age: 58
End: 2023-09-13

## 2023-09-13 ENCOUNTER — HOSPITAL ENCOUNTER (INPATIENT)
Facility: CLINIC | Age: 58
LOS: 1 days | Discharge: HOME | End: 2023-09-15
Attending: INTERNAL MEDICINE | Admitting: INTERNAL MEDICINE
Payer: COMMERCIAL

## 2023-09-13 VITALS
DIASTOLIC BLOOD PRESSURE: 94 MMHG | TEMPERATURE: 97.8 F | WEIGHT: 221 LBS | OXYGEN SATURATION: 96 % | RESPIRATION RATE: 13 BRPM | HEART RATE: 81 BPM | SYSTOLIC BLOOD PRESSURE: 130 MMHG | BODY MASS INDEX: 29.56 KG/M2

## 2023-09-13 DIAGNOSIS — R06.02 SHORTNESS OF BREATH: ICD-10-CM

## 2023-09-13 DIAGNOSIS — I48.91 NEW ONSET A-FIB (H): Primary | ICD-10-CM

## 2023-09-13 DIAGNOSIS — I48.91 ATRIAL FIBRILLATION WITH RAPID VENTRICULAR RESPONSE (H): ICD-10-CM

## 2023-09-13 DIAGNOSIS — I42.9 CARDIOMYOPATHY, UNSPECIFIED TYPE (H): ICD-10-CM

## 2023-09-13 LAB
ALBUMIN SERPL BCG-MCNC: 4.1 G/DL (ref 3.5–5.2)
ALP SERPL-CCNC: 74 U/L (ref 40–129)
ALT SERPL W P-5'-P-CCNC: 52 U/L (ref 0–70)
ANION GAP SERPL CALCULATED.3IONS-SCNC: 14 MMOL/L (ref 7–15)
AST SERPL W P-5'-P-CCNC: 46 U/L (ref 0–45)
BASE EXCESS BLDV CALC-SCNC: 2.1 MMOL/L (ref -7.7–1.9)
BASOPHILS # BLD AUTO: 0.1 10E3/UL (ref 0–0.2)
BASOPHILS NFR BLD AUTO: 1 %
BILIRUB SERPL-MCNC: 2 MG/DL
BUN SERPL-MCNC: 9 MG/DL (ref 8–23)
CALCIUM SERPL-MCNC: 9.7 MG/DL (ref 8.8–10.2)
CHLORIDE SERPL-SCNC: 103 MMOL/L (ref 98–107)
CREAT SERPL-MCNC: 1.04 MG/DL (ref 0.67–1.17)
D DIMER PPP FEU-MCNC: 0.72 UG/ML FEU (ref 0–0.5)
DEPRECATED HCO3 PLAS-SCNC: 23 MMOL/L (ref 22–29)
EGFRCR SERPLBLD CKD-EPI 2021: 82 ML/MIN/1.73M2
EOSINOPHIL # BLD AUTO: 0 10E3/UL (ref 0–0.7)
EOSINOPHIL NFR BLD AUTO: 0 %
ERYTHROCYTE [DISTWIDTH] IN BLOOD BY AUTOMATED COUNT: 13 % (ref 10–15)
GLUCOSE SERPL-MCNC: 104 MG/DL (ref 70–99)
HCO3 BLDV-SCNC: 24 MMOL/L (ref 21–28)
HCT VFR BLD AUTO: 47.1 % (ref 40–53)
HGB BLD-MCNC: 15.9 G/DL (ref 13.3–17.7)
IMM GRANULOCYTES # BLD: 0 10E3/UL
IMM GRANULOCYTES NFR BLD: 0 %
LYMPHOCYTES # BLD AUTO: 1.6 10E3/UL (ref 0.8–5.3)
LYMPHOCYTES NFR BLD AUTO: 16 %
MCH RBC QN AUTO: 32.7 PG (ref 26.5–33)
MCHC RBC AUTO-ENTMCNC: 33.8 G/DL (ref 31.5–36.5)
MCV RBC AUTO: 97 FL (ref 78–100)
MONOCYTES # BLD AUTO: 0.9 10E3/UL (ref 0–1.3)
MONOCYTES NFR BLD AUTO: 9 %
NEUTROPHILS # BLD AUTO: 7.2 10E3/UL (ref 1.6–8.3)
NEUTROPHILS NFR BLD AUTO: 74 %
NRBC # BLD AUTO: 0 10E3/UL
NRBC BLD AUTO-RTO: 0 /100
O2/TOTAL GAS SETTING VFR VENT: 21 %
PCO2 BLDV: 31 MM HG (ref 40–50)
PH BLDV: 7.51 [PH] (ref 7.32–7.43)
PLATELET # BLD AUTO: 172 10E3/UL (ref 150–450)
PO2 BLDV: 34 MM HG (ref 25–47)
POTASSIUM SERPL-SCNC: 4.1 MMOL/L (ref 3.4–5.3)
PROT SERPL-MCNC: 6.2 G/DL (ref 6.4–8.3)
RBC # BLD AUTO: 4.86 10E6/UL (ref 4.4–5.9)
SODIUM SERPL-SCNC: 140 MMOL/L (ref 136–145)
TROPONIN T SERPL HS-MCNC: 11 NG/L
UFH PPP CHRO-ACNC: <0.1 IU/ML
WBC # BLD AUTO: 9.8 10E3/UL (ref 4–11)

## 2023-09-13 PROCEDURE — 80053 COMPREHEN METABOLIC PANEL: CPT | Performed by: FAMILY MEDICINE

## 2023-09-13 PROCEDURE — 250N000011 HC RX IP 250 OP 636: Performed by: FAMILY MEDICINE

## 2023-09-13 PROCEDURE — 96361 HYDRATE IV INFUSION ADD-ON: CPT | Performed by: FAMILY MEDICINE

## 2023-09-13 PROCEDURE — 258N000003 HC RX IP 258 OP 636: Performed by: FAMILY MEDICINE

## 2023-09-13 PROCEDURE — 250N000009 HC RX 250: Performed by: FAMILY MEDICINE

## 2023-09-13 PROCEDURE — 93005 ELECTROCARDIOGRAM TRACING: CPT | Performed by: FAMILY MEDICINE

## 2023-09-13 PROCEDURE — 82803 BLOOD GASES ANY COMBINATION: CPT | Performed by: FAMILY MEDICINE

## 2023-09-13 PROCEDURE — 84484 ASSAY OF TROPONIN QUANT: CPT | Performed by: FAMILY MEDICINE

## 2023-09-13 PROCEDURE — 85379 FIBRIN DEGRADATION QUANT: CPT | Performed by: FAMILY MEDICINE

## 2023-09-13 PROCEDURE — 71275 CT ANGIOGRAPHY CHEST: CPT

## 2023-09-13 PROCEDURE — 85025 COMPLETE CBC W/AUTO DIFF WBC: CPT | Performed by: FAMILY MEDICINE

## 2023-09-13 PROCEDURE — 36415 COLL VENOUS BLD VENIPUNCTURE: CPT | Performed by: FAMILY MEDICINE

## 2023-09-13 PROCEDURE — 250N000011 HC RX IP 250 OP 636: Mod: JZ | Performed by: FAMILY MEDICINE

## 2023-09-13 PROCEDURE — 93010 ELECTROCARDIOGRAM REPORT: CPT | Performed by: FAMILY MEDICINE

## 2023-09-13 PROCEDURE — 96366 THER/PROPH/DIAG IV INF ADDON: CPT | Performed by: FAMILY MEDICINE

## 2023-09-13 PROCEDURE — 96376 TX/PRO/DX INJ SAME DRUG ADON: CPT | Performed by: FAMILY MEDICINE

## 2023-09-13 PROCEDURE — 96365 THER/PROPH/DIAG IV INF INIT: CPT | Mod: 59 | Performed by: FAMILY MEDICINE

## 2023-09-13 PROCEDURE — 96375 TX/PRO/DX INJ NEW DRUG ADDON: CPT | Performed by: FAMILY MEDICINE

## 2023-09-13 PROCEDURE — 85520 HEPARIN ASSAY: CPT | Performed by: FAMILY MEDICINE

## 2023-09-13 PROCEDURE — 250N000013 HC RX MED GY IP 250 OP 250 PS 637: Performed by: FAMILY MEDICINE

## 2023-09-13 PROCEDURE — 71045 X-RAY EXAM CHEST 1 VIEW: CPT

## 2023-09-13 PROCEDURE — 99291 CRITICAL CARE FIRST HOUR: CPT | Mod: 25 | Performed by: FAMILY MEDICINE

## 2023-09-13 PROCEDURE — 99285 EMERGENCY DEPT VISIT HI MDM: CPT | Mod: 25 | Performed by: FAMILY MEDICINE

## 2023-09-13 RX ORDER — SODIUM CHLORIDE 9 MG/ML
INJECTION, SOLUTION INTRAVENOUS CONTINUOUS
Status: DISCONTINUED | OUTPATIENT
Start: 2023-09-13 | End: 2023-09-13 | Stop reason: HOSPADM

## 2023-09-13 RX ORDER — DILTIAZEM HCL/D5W 125 MG/125
5-15 PLASTIC BAG, INJECTION (ML) INTRAVENOUS CONTINUOUS
Status: DISCONTINUED | OUTPATIENT
Start: 2023-09-13 | End: 2023-09-13 | Stop reason: HOSPADM

## 2023-09-13 RX ORDER — DIGOXIN 0.25 MG/ML
500 INJECTION INTRAMUSCULAR; INTRAVENOUS ONCE
Status: COMPLETED | OUTPATIENT
Start: 2023-09-13 | End: 2023-09-13

## 2023-09-13 RX ORDER — METOPROLOL TARTRATE 1 MG/ML
5 INJECTION, SOLUTION INTRAVENOUS ONCE
Status: COMPLETED | OUTPATIENT
Start: 2023-09-13 | End: 2023-09-13

## 2023-09-13 RX ORDER — IOPAMIDOL 755 MG/ML
500 INJECTION, SOLUTION INTRAVASCULAR ONCE
Status: COMPLETED | OUTPATIENT
Start: 2023-09-13 | End: 2023-09-13

## 2023-09-13 RX ORDER — FUROSEMIDE 10 MG/ML
40 INJECTION INTRAMUSCULAR; INTRAVENOUS ONCE
Status: COMPLETED | OUTPATIENT
Start: 2023-09-13 | End: 2023-09-13

## 2023-09-13 RX ORDER — HEPARIN SODIUM 10000 [USP'U]/100ML
0-5000 INJECTION, SOLUTION INTRAVENOUS CONTINUOUS
Status: DISCONTINUED | OUTPATIENT
Start: 2023-09-13 | End: 2023-09-13 | Stop reason: HOSPADM

## 2023-09-13 RX ORDER — DILTIAZEM HYDROCHLORIDE 5 MG/ML
20 INJECTION INTRAVENOUS ONCE
Status: COMPLETED | OUTPATIENT
Start: 2023-09-13 | End: 2023-09-13

## 2023-09-13 RX ORDER — METOPROLOL TARTRATE 25 MG/1
25 TABLET, FILM COATED ORAL ONCE
Status: COMPLETED | OUTPATIENT
Start: 2023-09-13 | End: 2023-09-13

## 2023-09-13 RX ADMIN — Medication 5 MG/HR: at 09:29

## 2023-09-13 RX ADMIN — DIGOXIN 500 MCG: 0.25 INJECTION INTRAMUSCULAR; INTRAVENOUS at 13:43

## 2023-09-13 RX ADMIN — FUROSEMIDE 40 MG: 10 INJECTION, SOLUTION INTRAMUSCULAR; INTRAVENOUS at 11:46

## 2023-09-13 RX ADMIN — IOPAMIDOL 75 ML: 755 INJECTION, SOLUTION INTRAVENOUS at 10:39

## 2023-09-13 RX ADMIN — SODIUM CHLORIDE 70 ML: 9 INJECTION, SOLUTION INTRAVENOUS at 10:39

## 2023-09-13 RX ADMIN — Medication 20 MG/HR: at 16:40

## 2023-09-13 RX ADMIN — HEPARIN SODIUM 1200 UNITS/HR: 10000 INJECTION, SOLUTION INTRAVENOUS at 14:04

## 2023-09-13 RX ADMIN — HEPARIN SODIUM 1500 UNITS/HR: 10000 INJECTION, SOLUTION INTRAVENOUS at 19:36

## 2023-09-13 RX ADMIN — METOPROLOL TARTRATE 5 MG: 1 INJECTION, SOLUTION INTRAVENOUS at 14:17

## 2023-09-13 RX ADMIN — METOPROLOL TARTRATE 25 MG: 25 TABLET, FILM COATED ORAL at 15:08

## 2023-09-13 RX ADMIN — DILTIAZEM HYDROCHLORIDE 20 MG: 5 INJECTION INTRAVENOUS at 08:58

## 2023-09-13 RX ADMIN — SODIUM CHLORIDE: 9 INJECTION, SOLUTION INTRAVENOUS at 09:28

## 2023-09-13 ASSESSMENT — ACTIVITIES OF DAILY LIVING (ADL)
ADLS_ACUITY_SCORE: 35

## 2023-09-13 NOTE — TELEPHONE ENCOUNTER
S-(situation): Can't catch his breath x 2 days    B-(background):   Patient state he is having rapid breathing, started 2 days ago, constant, can't sleep flat- needs to sleep upright in a chair, moderate difficulty, his calves were swollen yesterday, states they will be later today. Tried Levabuterol inhaler he got for previous covid infection- not much help. Denies fever. Slight dizziness with standing only. Pulse of 85 this morning.Feels like he can't get air in. States a little bit of chest pain with this. Patient does not have a pulse oximeter at home.     A-(assessment): Patient needs to be seen in the ER      R-(recommendations): Recommend patient go to ER now. Patient states he does not have someone to drive him as his wife passed away a few months ago. Patient states he will be fine to drive him self. Patient denies dizziness when in a seated position. Advised his safest way is to call 911. Patient will get ready and think about the 911 route.     Reason for Disposition   MODERATE difficulty breathing (e.g., speaks in phrases, SOB even at rest, pulse 100-120) of new-onset or worse than normal    Additional Information   Negative: SEVERE difficulty breathing (e.g., struggling for each breath, speaks in single words, pulse > 120)   Negative: Breathing stopped and hasn't returned   Negative: Choking on something   Negative: Difficult to awaken or acting confused (e.g., disoriented, slurred speech)   Negative: Passed out (i.e., fainted, collapsed and was not responding)   Negative: Wheezing started suddenly after medicine, an allergic food, or bee sting   Negative: Stridor (harsh sound while breathing in)   Negative: Slow, shallow and weak breathing    Protocols used: Breathing Difficulty-A-[place]

## 2023-09-13 NOTE — MEDICATION SCRIBE - ADMISSION MEDICATION HISTORY
Medication Scribe Admission Medication History    Admission medication history is complete. The information provided in this note is only as accurate as the sources available at the time of the update.    Medication reconciliation/reorder completed by provider prior to medication history? No    Information Source(s): Patient and CareEverywhere/SureScripts via in-person    Pertinent Information: n/a    Changes made to PTA medication list:  Added: None  Deleted: None  Changed: None    Medication Affordability:  Not including over the counter (OTC) medications, was there a time in the past 3 months when you did not take your medications as prescribed because of cost?: No    Allergies reviewed with patient and updates made in EHR: yes    Medication History Completed By: EMILY CHING 9/13/2023 2:30 PM    Prior to Admission medications    Medication Sig Last Dose Taking? Auth Provider Long Term End Date   amLODIPine (NORVASC) 10 MG tablet Take 1 tablet (10 mg) by mouth daily 9/13/2023 at 0530 Yes Ibrahima Casper MD Yes    levothyroxine (SYNTHROID/LEVOTHROID) 88 MCG tablet Take 1 tablet (88 mcg) by mouth daily 9/13/2023 at 0530 Yes Ibrahima Casper MD Yes

## 2023-09-13 NOTE — ED NOTES
Pt's sister, Jessica, called for an update. Jessica updated on pt status and plan to transfer. Sister added to contact list and would like updates while pt is transferred.    Jessica - Sister [phone]

## 2023-09-13 NOTE — ED PROVIDER NOTES
[facility] ED Provider Note   Patient: Dom Durham  MRN #:  [id number]  Date of Visit: September 13, 2023    CC:     Chief Complaint   Patient presents with    Shortness of Breath     HPI:  Dom Durham is a 58 year old male who presented to the emergency department with 2-day history of orthopnea, PND, and mild left-sided chest discomfort.  Patient reports that he has been unable to sleep laying flat, and his shortness of breath is exacerbated by activity.  This was not preceded by any significant chest pain or pressure.  He has a very minimal amount of left-sided chest pain described as sharp.  He has noticed bilateral calf swelling but no ankle or thigh swelling.  He has no prior history of CHF, MI, or DVT/PE.  The patient's wife passed away on July 30 after blunt head trauma in the setting of underlying cirrhosis.  He has been grieving since then.  He has a history of hypertension, hyperlipidemia, hypothyroidism.  He is currently on amlodipine and levothyroxine.  Patient denies any tobacco use.  Last alcohol use was 3 to 4 days ago.  Patient denies any prior history of atrial fibrillation/flutter.  He denies any sensation of palpitations.    Problem List:  Patient Active Problem List    Diagnosis Date Noted    Essential hypertension 10/10/2021     Priority: Medium    Bilateral hearing loss 01/24/2020     Priority: Medium     Formatting of this note might be different from the original.  Loud noise.      Dermatitis of external ear 01/24/2020     Priority: Medium    Hx of colonic polyp 01/24/2020     Priority: Medium     Formatting of this note might be different from the original.  Tubular adenoma      Hypothyroidism, unspecified type 05/15/2013     Priority: Medium    Fatty liver 05/15/2013     Priority: Medium    Hyperlipemia 01/08/2009     Priority: Medium     Formatting of this note might be different from the original.  The  "10-year CVD risk score (Elias et al., 2008) is: 15.4%-> consider CT coronary calcium screen    Values used to calculate the score:      Age: 54 years      Sex: Male      Diabetic: No      Tobacco smoker: No      Systolic Blood Pressure: 148 mmHg      Is BP treated: No      HDL Cholesterol: 57 mg/dL      Total Cholesterol: 232 mg/dL      Vitamin D deficiency 01/08/2009     Priority: Medium       Past Medical History:   Diagnosis Date    History of ankle fracture     Hypertension     Thyroid dysfunction        MEDS: amLODIPine (NORVASC) 10 MG tablet  levothyroxine (SYNTHROID/LEVOTHROID) 88 MCG tablet        ALLERGIES:    Allergies   Allergen Reactions    Amoxicillin-Pot Clavulanate Other (See Comments)     \"I was so weak and just felt like dying\"    Dilaudid [Hydromorphone] Nausea and Vomiting     \"ok with Percocet\"    Hydrocodone Nausea     \"also did not do anything for my pain\"       Past Surgical History:   Procedure Laterality Date    ANKLE SURGERY  1984    COLONOSCOPY N/A 6/10/2016    Procedure: COLONOSCOPY;  Surgeon: Jarek Chaudhari MD;  Location:  GI    COLONOSCOPY N/A 2/9/2023    Procedure: COLONOSCOPY;  Surgeon: Vitaliy Huff DO;  Location:  GI    ESOPHAGOSCOPY, GASTROSCOPY, DUODENOSCOPY (EGD), COMBINED  5/24/2013    Procedure: COMBINED ESOPHAGOSCOPY, GASTROSCOPY, DUODENOSCOPY (EGD), BIOPSY SINGLE OR MULTIPLE;;  Surgeon: Timothy Jennings MD;  Location:  GI    LAPAROSCOPY DIAGNOSTIC (GENERAL)  8/14/2013    Procedure: LAPAROSCOPY DIAGNOSTIC (GENERAL);  Diagnostic Laparoscopy;  Surgeon: Stu Arias MD;  Location: [facility] OR    SHOULDER SURGERY         Social History     Tobacco Use    Smoking status: Never    Smokeless tobacco: Never   Vaping Use    Vaping Use: Never used   Substance Use Topics    Alcohol use: Yes     Alcohol/week: 6.0 standard drinks of alcohol     Types: 6 Shots of liquor per week    Drug use: No         Review of Systems   Except as noted in HPI, all other " systems were reviewed and are negative    Physical Exam   Vitals were reviewed  Patient Vitals for the past 12 hrs:   BP Temp Temp src Pulse Resp SpO2 Weight   09/13/23 1830 (!) 130/94 -- -- 81 13 96 % --   09/13/23 1815 (!) 107/93 -- -- 93 19 95 % --   09/13/23 1800 102/64 -- -- 70 14 90 % --   09/13/23 1745 95/70 -- -- 82 18 92 % --   09/13/23 1730 104/83 -- -- 86 27 93 % --   09/13/23 1715 -- -- -- 82 16 94 % --   09/13/23 1700 (!) 103/93 -- -- 91 24 92 % --   09/13/23 1645 98/75 -- -- 92 10 92 % --   09/13/23 1630 106/79 -- -- 81 24 93 % --   09/13/23 1615 119/85 -- -- 80 25 92 % --   09/13/23 1600 103/73 -- -- 83 23 95 % --   09/13/23 1545 (!) 117/96 -- -- 110 18 95 % --   09/13/23 1530 103/59 -- -- (!) 130 14 -- --   09/13/23 1515 (!) 112/95 -- -- (!) 130 14 -- --   09/13/23 1508 102/84 -- -- (!) 138 21 91 % --   09/13/23 1503 -- -- -- (!) 125 22 92 % --   09/13/23 1500 102/84 -- -- (!) 138 22 92 % --   09/13/23 1448 101/82 -- -- (!) 137 24 94 % --   09/13/23 1445 -- -- -- (!) 138 17 94 % --   09/13/23 1433 -- -- -- (!) 128 23 97 % --   09/13/23 1430 -- -- -- (!) 151 10 96 % --   09/13/23 1418 -- -- -- (!) 147 19 95 % --   09/13/23 1415 (!) 120/94 -- -- (!) 147 12 99 % --   09/13/23 1406 103/88 -- -- (!) 149 17 94 % --   09/13/23 1403 -- -- -- (!) 135 18 94 % --   09/13/23 1400 -- -- -- (!) 146 10 96 % --   09/13/23 1348 -- -- -- (!) 142 24 95 % --   09/13/23 1345 -- -- -- (!) 151 13 93 % --   09/13/23 1333 -- -- -- (!) 152 21 100 % --   09/13/23 1330 -- -- -- (!) 152 10 94 % --   09/13/23 1318 -- -- -- (!) 147 18 93 % --   09/13/23 1315 -- -- -- (!) 144 10 94 % --   09/13/23 1303 -- -- -- (!) 146 28 93 % --   09/13/23 1300 -- -- -- (!) 147 25 96 % --   09/13/23 1248 -- -- -- (!) 149 21 96 % --   09/13/23 1245 -- -- -- (!) 152 11 97 % --   09/13/23 1233 -- -- -- (!) 147 (!) 9 97 % --   09/13/23 1230 (!) 116/101 -- -- (!) 154 22 99 % --   09/13/23 1218 -- -- -- (!) 152 19 95 % --   09/13/23 1215 (!) 118/97 --  -- (!) 161 19 94 % --   09/13/23 1203 -- -- -- (!) 147 12 93 % --   09/13/23 1200 -- -- -- (!) 146 23 92 % --   09/13/23 1148 -- -- -- (!) 152 12 96 % --   09/13/23 1145 119/85 -- -- (!) 154 14 93 % --   09/13/23 1133 -- -- -- (!) 152 26 92 % --   09/13/23 1130 (!) 117/106 -- -- (!) 146 24 93 % --   09/13/23 1118 -- -- -- (!) 147 19 94 % --   09/13/23 1115 101/85 -- -- (!) 152 27 96 % --   09/13/23 1103 (!) 113/94 -- -- (!) 154 26 93 % --   09/13/23 1100 (!) 113/94 -- -- (!) 147 16 94 % --   09/13/23 1030 (!) 120/100 -- -- (!) 152 15 96 % --   09/13/23 1015 (!) 108/93 -- -- (!) 147 -- 99 % --   09/13/23 1000 116/63 -- -- (!) 147 22 94 % --   09/13/23 0945 (!) 106/93 -- -- (!) 168 12 95 % --   09/13/23 0930 (!) 112/102 -- -- (!) 141 -- 94 % --   09/13/23 0915 -- -- -- (!) 141 16 96 % --   09/13/23 0900 (!) 103/91 -- -- (!) 146 27 97 % --   09/13/23 0850 103/87 -- -- (!) 154 12 93 % --   09/13/23 0809 (!) 116/100 97.8  F (36.6  C) Oral 85 24 99 % 100.2 kg (221 lb)     GENERAL APPEARANCE: Alert and oriented x3; sad affect  FACE: normal facies  EYES: Pupils are equal  HENT: normal external exam  NECK: no adenopathy or asymmetry  RESP: normal respiratory effort; clear breath sounds bilaterally  CV: Irregularly irregular rhythm, rapid rate, no appreciable murmurs  ABD: soft, no tenderness; no rebound or guarding; bowel sounds are normal  MS: no gross deformities noted; normal muscle tone.  EXT: Bilateral calf swelling with 1+ pitting edema  SKIN: no worrisome rash  NEURO: no facial droop; no focal deficits, speech is normal  PSYCH: Sad affect      Available Lab/Imaging Results     Results for orders placed or performed during the hospital encounter of 09/13/23 (from the past 24 hour(s))   CBC with platelets differential    Narrative    The following orders were created for panel order CBC with platelets differential.  Procedure                               Abnormality         Status                     ---------                                -----------         ------                     CBC with platelets and d...[[id number]]                      Final result                 Please view results for these tests on the individual orders.   Comprehensive metabolic panel   Result Value Ref Range    Sodium 140 136 - 145 mmol/L    Potassium 4.1 3.4 - 5.3 mmol/L    Chloride 103 98 - 107 mmol/L    Carbon Dioxide (CO2) 23 22 - 29 mmol/L    Anion Gap 14 7 - 15 mmol/L    Urea Nitrogen 9.0 8.0 - 23.0 mg/dL    Creatinine 1.04 0.67 - 1.17 mg/dL    Calcium 9.7 8.8 - 10.2 mg/dL    Glucose 104 (H) 70 - 99 mg/dL    Alkaline Phosphatase 74 40 - 129 U/L    AST 46 (H) 0 - 45 U/L    ALT 52 0 - 70 U/L    Protein Total 6.2 (L) 6.4 - 8.3 g/dL    Albumin 4.1 3.5 - 5.2 g/dL    Bilirubin Total 2.0 (H) <=1.2 mg/dL    GFR Estimate 82 >60 mL/min/1.73m2   D dimer quantitative   Result Value Ref Range    D-Dimer Quantitative 0.72 (H) 0.00 - 0.50 ug/mL FEU    Narrative    This D-dimer assay is intended for use in conjunction with a clinical pretest probability assessment model to exclude pulmonary embolism (PE) and deep venous thrombosis (DVT) in outpatients suspected of PE or DVT. The cut-off value is 0.50 ug/mL FEU.    For patients 50 years of age or older, the application of age-adjusted cut-off values for D-Dimer may increase the specificity without significant effect on sensitivity. The literature suggested calculation age adjusted cut-off in ug/L = age in years x 10 ug/L. The results in this laboratory are reported as ug/mL rather than ug/L. The calculation for age adjusted cut off in ug/mL= age in years x 0.01 ug/mL. For example, the cut off for a 76 year old male is 74 x 0.01 ug/mL = 0.76 ug/mL (760 ug/L).    M Rosalind et al. Age adjusted D-dimer cut-off levels to rule out pulmonary embolism: The ADJUST-PE Study. TIERA 2014;311:4609-5381.; HJ Edy et al. Diagnostic accuracy of conventional or age adjusted D-dimer cutoff values in older patients with  suspected venous thromboembolism. Systemic review and meta-analysis. BMJ 2013:346:f2492.   Blood gas venous   Result Value Ref Range    pH Venous 7.51 (H) 7.32 - 7.43    pCO2 Venous 31 (L) 40 - 50 mm Hg    pO2 Venous 34 25 - 47 mm Hg    Bicarbonate Venous 24 21 - 28 mmol/L    Base Excess/Deficit 2.1 (H) -7.7 - 1.9 mmol/L    FIO2 21    Troponin T, High Sensitivity   Result Value Ref Range    Troponin T, High Sensitivity 11 <=22 ng/L   CBC with platelets and differential   Result Value Ref Range    WBC Count 9.8 4.0 - 11.0 10e3/uL    RBC Count 4.86 4.40 - 5.90 10e6/uL    Hemoglobin 15.9 13.3 - 17.7 g/dL    Hematocrit 47.1 40.0 - 53.0 %    MCV 97 78 - 100 fL    MCH 32.7 26.5 - 33.0 pg    MCHC 33.8 31.5 - 36.5 g/dL    RDW 13.0 10.0 - 15.0 %    Platelet Count 172 150 - 450 10e3/uL    % Neutrophils 74 %    % Lymphocytes 16 %    % Monocytes 9 %    % Eosinophils 0 %    % Basophils 1 %    % Immature Granulocytes 0 %    NRBCs per 100 WBC 0 <1 /100    Absolute Neutrophils 7.2 1.6 - 8.3 10e3/uL    Absolute Lymphocytes 1.6 0.8 - 5.3 10e3/uL    Absolute Monocytes 0.9 0.0 - 1.3 10e3/uL    Absolute Eosinophils 0.0 0.0 - 0.7 10e3/uL    Absolute Basophils 0.1 0.0 - 0.2 10e3/uL    Absolute Immature Granulocytes 0.0 <=0.4 10e3/uL    Absolute NRBCs 0.0 10e3/uL   XR Chest Port 1 View    Narrative    CHEST ONE VIEW  9/13/2023 8:55 AM     HISTORY: Short of air.    COMPARISON: Chest radiograph 10/8/2021.      Impression    IMPRESSION: No focal consolidation, pleural effusion or pneumothorax.  Apparent increased heart size is likely related to technique, although  clinically correlate. Left shoulder rotator cuff surgical anchor.  Remote left rib fracture.     ARMANDO MACHADO MD         SYSTEM ID:  [id number]   CT Chest Pulmonary Embolism w Contrast    Narrative    CT CHEST PULMONARY EMBOLISM WITH CONTRAST 9/13/2023 10:50 AM    CLINICAL HISTORY: Chest pain. Dyspnea, orthopnea, PND, bilateral calf  swelling for 2 days.     TECHNIQUE: CT  angiogram chest during arterial phase injection IV  contrast. 2D and 3D MIP reconstructions were performed by the CT  technologist. Dose reduction techniques were used.     CONTRAST: Isovue-370, 75 mL    COMPARISON: Chest radiograph 9/13/2023.    FINDINGS:  ANGIOGRAM CHEST: No pulmonary embolus. Ascending thoracic aorta  measures up to 4.2 cm, similar to prior, which is not well-opacified.    LOWER NECK: Unremarkable.     LUNGS AND PLEURA: Mild interstitial pulmonary edema. Small right and  trace left pleural effusions. No focal consolidation. Mild bronchial  wall thickening. No suspicious pulmonary nodule.    AIRWAYS: Patent.    HEART: Enlarged heart. No pericardial effusion. Mild coronary artery  calcification.    MEDIASTINUM/AXILLAE: Few scattered prominent mediastinal lymph nodes  measuring up to 0.9 cm are favored to be reactive. Calcified lymph  nodes.    UPPER ABDOMEN: Early filling of the hepatic veins. No acute findings  within the upper abdomen.    MUSCULOSKELETAL: Remote rib fractures. No aggressive osseous lesion.  Mild degenerative changes of the spine.      Impression    IMPRESSION:  1.  No acute pulmonary embolus.  2.  Sequelae of congestive heart failure including mild interstitial  pulmonary edema, small right and trace left pleural effusions,  cardiomegaly, and early filling of the hepatic veins.    ARMANDO MACHADO MD         SYSTEM ID:  [id number]   Heparin Unfractionated Anti Xa Level   Result Value Ref Range    Anti Xa Unfractionated Heparin <0.10 For Reference Range, See Comment IU/mL    Narrative    Therapeutic Range: UFH: 0.25-0.50 IU/mL for low intensity dosing,  0.30-0.70 IU/mL for high intensity dosing DVT and PE.  This test is not validated for other direct factor X inhibitors (e.g. rivaroxaban, apixaban, edoxaban, betrixaban, fondaparinux) and should not be used for monitoring of other medications.     EKG reviewed by me: Atrial fibrillation with occasional ectopic ventricular beat.   Nonspecific ST depression.  Heart rate of 166.  QT interval of 389 ms, normal axis.  Compared with previous EKG from 2013, new onset atrial fibrillation.           Impression     Final diagnoses:   Shortness of breath   Atrial fibrillation with rapid ventricular response (H)         ED Course & Medical Decision Making   Dom Durham is a 58 year old male who presented to the emergency department with 2-day history of dyspnea associated with orthopnea, PND, and exertion.  He has atypical chest pain with reported sharp chest pains.  He has no prior history of PE, MI, CHF, or COPD.     Vital signs reveal initial blood pressure 116/100, temperature of 97.8,, heart rate of 154, respiratory rate of 12 with 93% oxygen saturation.  Exam reveals no thyromegaly, no JVD, no carotid bruits.  Lungs are clear without adventitious breath sounds.  Heart reveals irregular rhythm and rapid rate.  No abdominal tenderness.  Bilateral calf swelling with 1+ pitting edema.    EKG reveals atrial fibrillation with rapid ventricular rate.  Nonspecific ST depression.  CBC reveals a normal white blood count, hemoglobin and platelet count with normal differential.  Venous blood gas reveals a pH of 7.51, PCO2 of 31, PO2 of 34.  D-dimer is elevated 0.72.  Comprehensive metabolic panel reveals elevated nonfasting glucose of 104, AST slightly elevated 46, ALT of 52, with total bilirubin level of 2.0.  Chest x-ray reveals no focal consolidation, pleural effusion or pneumothorax.  There is apparent increased heart size, likely related to technique.    CT scan of the chest with contrast reveals no acute pulmonary embolus.  Sequela of congestive heart failure including mild interstitial pulmonary edema, small right and trace left pleural effusions, cardiomegaly, and early filling of the hepatic veins.    Patient has new onset atrial fibrillation with rapid ventricular rate.  Patient received diltiazem 20 mg bolus followed by an infusion of 0-15  mg/h.  We reached the maximum dose without any improvement in his heart rate.  I spoke with Dr. Pickard, cardiology at [facility].  He recommended that we try digoxin 500 mcg, and metoprolol 5 mg IV.  Despite the addition of these 2 medications, the patient's heart rate has not improved.  He was also started on heparin for anticoagulation.  Patient has had good urine output following his single dose of IV Lasix.    I spoke with Dr. Pickard again, and he thought that the patient might need to have a transesophageal echo and cardioversion if we are not able to control his heart rate.  Patient is currently on diltiazem drip at 20 mg/h, and will receive an oral dose of metoprolol.    3:45 PM: Patient's heart rate is starting to improve.  Continue with diltiazem drip, and add oral metoprolol.    We have been looking for bed placement and there appears to be a bed available at [facility] this evening.    Patient remains stable at this time.  We are trying to wean his diltiazem drip to minimize hypotension and control his heart rate.    7:20 pm: I spoke with Dr. White, hospitalist at [facility]. She has kindly accepted the patient in transfer to the [facility] unit.          Patient was signed out to Dr. Lynn at the change of shift pending formal transfer.        Critical care time is 60 minutes.       Disclaimer: This note consists of words and symbols derived from keyboarding and dictation using voice recognition software.  As a result, there may be errors that have gone undetected.  Please consider this when interpreting information found in this note.       Sofía Delgadillo MD  09/13/23 1913       Sofía Delgadillo MD  09/13/23 1923

## 2023-09-13 NOTE — ED TRIAGE NOTES
"Pt states the last few nights he has had significant SOB. Tried using his inhaler that was Rx when he had COVID last year but it has \"done nothing.\"  States his calves are also swollen and not his ankles.   Alert and oriented. RR 26-30 on intake. Resting now RR 18 - 20.  PT's wife of >20 years  the end of July this year.   Triage Assessment       Row Name [id number]       Triage Assessment (Adult)    Airway WDL WDL       Respiratory WDL    Respiratory WDL X;rhythm/pattern    Rhythm/Pattern, Respiratory shortness of breath       Skin Circulation/Temperature WDL    Skin Circulation/Temperature WDL X;temperature    Skin Temperature cool       Cognitive/Neuro/Behavioral WDL    Cognitive/Neuro/Behavioral WDL WDL                    "

## 2023-09-14 ENCOUNTER — APPOINTMENT (OUTPATIENT)
Dept: CARDIOLOGY | Facility: CLINIC | Age: 58
End: 2023-09-14
Attending: INTERNAL MEDICINE
Payer: COMMERCIAL

## 2023-09-14 ENCOUNTER — APPOINTMENT (OUTPATIENT)
Dept: ULTRASOUND IMAGING | Facility: CLINIC | Age: 58
End: 2023-09-14
Attending: INTERNAL MEDICINE
Payer: COMMERCIAL

## 2023-09-14 ENCOUNTER — ANESTHESIA EVENT (OUTPATIENT)
Dept: SURGERY | Facility: CLINIC | Age: 58
End: 2023-09-14
Payer: COMMERCIAL

## 2023-09-14 ENCOUNTER — ANESTHESIA (OUTPATIENT)
Dept: SURGERY | Facility: CLINIC | Age: 58
End: 2023-09-14
Payer: COMMERCIAL

## 2023-09-14 PROBLEM — I48.91 NEW ONSET A-FIB (H): Status: ACTIVE | Noted: 2023-09-14

## 2023-09-14 LAB
ALBUMIN SERPL BCG-MCNC: 3.8 G/DL (ref 3.5–5.2)
ALP SERPL-CCNC: 68 U/L (ref 40–129)
ALT SERPL W P-5'-P-CCNC: 54 U/L (ref 0–70)
ANION GAP SERPL CALCULATED.3IONS-SCNC: 12 MMOL/L (ref 7–15)
AST SERPL W P-5'-P-CCNC: 60 U/L (ref 0–45)
BILIRUB DIRECT SERPL-MCNC: 0.38 MG/DL (ref 0–0.3)
BILIRUB SERPL-MCNC: 1.5 MG/DL
BUN SERPL-MCNC: 9.2 MG/DL (ref 8–23)
CALCIUM SERPL-MCNC: 9.4 MG/DL (ref 8.8–10.2)
CHLORIDE SERPL-SCNC: 106 MMOL/L (ref 98–107)
CREAT SERPL-MCNC: 1.03 MG/DL (ref 0.67–1.17)
DEPRECATED HCO3 PLAS-SCNC: 20 MMOL/L (ref 22–29)
EGFRCR SERPLBLD CKD-EPI 2021: 83 ML/MIN/1.73M2
ERYTHROCYTE [DISTWIDTH] IN BLOOD BY AUTOMATED COUNT: 12.9 % (ref 10–15)
GLUCOSE SERPL-MCNC: 102 MG/DL (ref 70–99)
HCT VFR BLD AUTO: 48.6 % (ref 40–53)
HGB BLD-MCNC: 16.5 G/DL (ref 13.3–17.7)
INR PPP: 1.3 (ref 0.85–1.15)
LVEF ECHO: NORMAL
LVEF ECHO: NORMAL
MAGNESIUM SERPL-MCNC: 1.8 MG/DL (ref 1.7–2.3)
MAGNESIUM SERPL-MCNC: 1.8 MG/DL (ref 1.7–2.3)
MCH RBC QN AUTO: 33.3 PG (ref 26.5–33)
MCHC RBC AUTO-ENTMCNC: 34 G/DL (ref 31.5–36.5)
MCV RBC AUTO: 98 FL (ref 78–100)
NT-PROBNP SERPL-MCNC: 1794 PG/ML (ref 0–900)
PLATELET # BLD AUTO: 166 10E3/UL (ref 150–450)
POTASSIUM SERPL-SCNC: 4.1 MMOL/L (ref 3.4–5.3)
PROT SERPL-MCNC: 6.1 G/DL (ref 6.4–8.3)
RBC # BLD AUTO: 4.96 10E6/UL (ref 4.4–5.9)
SODIUM SERPL-SCNC: 138 MMOL/L (ref 136–145)
TSH SERPL DL<=0.005 MIU/L-ACNC: 3.41 UIU/ML (ref 0.3–4.2)
UFH PPP CHRO-ACNC: 0.23 IU/ML
UFH PPP CHRO-ACNC: 0.85 IU/ML
WBC # BLD AUTO: 8.6 10E3/UL (ref 4–11)

## 2023-09-14 PROCEDURE — 93010 ELECTROCARDIOGRAM REPORT: CPT | Mod: XU | Performed by: INTERNAL MEDICINE

## 2023-09-14 PROCEDURE — 250N000011 HC RX IP 250 OP 636: Mod: JZ | Performed by: INTERNAL MEDICINE

## 2023-09-14 PROCEDURE — 99223 1ST HOSP IP/OBS HIGH 75: CPT | Performed by: INTERNAL MEDICINE

## 2023-09-14 PROCEDURE — 36415 COLL VENOUS BLD VENIPUNCTURE: CPT | Performed by: INTERNAL MEDICINE

## 2023-09-14 PROCEDURE — 92960 CARDIOVERSION ELECTRIC EXT: CPT

## 2023-09-14 PROCEDURE — 83735 ASSAY OF MAGNESIUM: CPT | Performed by: INTERNAL MEDICINE

## 2023-09-14 PROCEDURE — 250N000013 HC RX MED GY IP 250 OP 250 PS 637: Performed by: INTERNAL MEDICINE

## 2023-09-14 PROCEDURE — 999N000010 HC STATISTIC ANES STAT CODE-CRNA PER MINUTE

## 2023-09-14 PROCEDURE — 93312 ECHO TRANSESOPHAGEAL: CPT | Mod: 26 | Performed by: INTERNAL MEDICINE

## 2023-09-14 PROCEDURE — 93325 DOPPLER ECHO COLOR FLOW MAPG: CPT | Mod: 26 | Performed by: INTERNAL MEDICINE

## 2023-09-14 PROCEDURE — 5A2204Z RESTORATION OF CARDIAC RHYTHM, SINGLE: ICD-10-PCS | Performed by: INTERNAL MEDICINE

## 2023-09-14 PROCEDURE — 85027 COMPLETE CBC AUTOMATED: CPT | Performed by: INTERNAL MEDICINE

## 2023-09-14 PROCEDURE — 93970 EXTREMITY STUDY: CPT

## 2023-09-14 PROCEDURE — 93325 DOPPLER ECHO COLOR FLOW MAPG: CPT

## 2023-09-14 PROCEDURE — 83880 ASSAY OF NATRIURETIC PEPTIDE: CPT | Performed by: INTERNAL MEDICINE

## 2023-09-14 PROCEDURE — 93005 ELECTROCARDIOGRAM TRACING: CPT

## 2023-09-14 PROCEDURE — 82248 BILIRUBIN DIRECT: CPT | Performed by: INTERNAL MEDICINE

## 2023-09-14 PROCEDURE — 85520 HEPARIN ASSAY: CPT | Performed by: INTERNAL MEDICINE

## 2023-09-14 PROCEDURE — 999N000184 HC STATISTIC TELEMETRY

## 2023-09-14 PROCEDURE — 250N000011 HC RX IP 250 OP 636: Performed by: INTERNAL MEDICINE

## 2023-09-14 PROCEDURE — 258N000003 HC RX IP 258 OP 636: Performed by: INTERNAL MEDICINE

## 2023-09-14 PROCEDURE — 255N000002 HC RX 255 OP 636: Performed by: INTERNAL MEDICINE

## 2023-09-14 PROCEDURE — 250N000009 HC RX 250: Performed by: INTERNAL MEDICINE

## 2023-09-14 PROCEDURE — 85610 PROTHROMBIN TIME: CPT | Performed by: INTERNAL MEDICINE

## 2023-09-14 PROCEDURE — 99207 PR APP CREDIT; MD BILLING SHARED VISIT: CPT | Performed by: INTERNAL MEDICINE

## 2023-09-14 PROCEDURE — 84443 ASSAY THYROID STIM HORMONE: CPT | Performed by: INTERNAL MEDICINE

## 2023-09-14 PROCEDURE — 92960 CARDIOVERSION ELECTRIC EXT: CPT | Performed by: INTERNAL MEDICINE

## 2023-09-14 PROCEDURE — 370N000017 HC ANESTHESIA TECHNICAL FEE, PER MIN

## 2023-09-14 PROCEDURE — 82374 ASSAY BLOOD CARBON DIOXIDE: CPT | Performed by: INTERNAL MEDICINE

## 2023-09-14 PROCEDURE — 93306 TTE W/DOPPLER COMPLETE: CPT | Mod: 26 | Performed by: INTERNAL MEDICINE

## 2023-09-14 PROCEDURE — 250N000011 HC RX IP 250 OP 636: Performed by: ANESTHESIOLOGY

## 2023-09-14 PROCEDURE — 210N000001 HC R&B IMCU HEART CARE

## 2023-09-14 PROCEDURE — 999N000183 HC STATISTIC TEE INCLUDES SEDATION

## 2023-09-14 PROCEDURE — 99223 1ST HOSP IP/OBS HIGH 75: CPT | Mod: 25 | Performed by: INTERNAL MEDICINE

## 2023-09-14 RX ORDER — POTASSIUM CHLORIDE 1500 MG/1
20 TABLET, EXTENDED RELEASE ORAL
Status: DISCONTINUED | OUTPATIENT
Start: 2023-09-14 | End: 2023-09-14

## 2023-09-14 RX ORDER — ATROPINE SULFATE 0.1 MG/ML
.5-1 INJECTION INTRAVENOUS
Status: DISCONTINUED | OUTPATIENT
Start: 2023-09-14 | End: 2023-09-14

## 2023-09-14 RX ORDER — METOPROLOL TARTRATE 25 MG/1
25 TABLET, FILM COATED ORAL 2 TIMES DAILY
Status: DISCONTINUED | OUTPATIENT
Start: 2023-09-14 | End: 2023-09-14

## 2023-09-14 RX ORDER — METOPROLOL TARTRATE 50 MG
50 TABLET ORAL 2 TIMES DAILY
Status: DISCONTINUED | OUTPATIENT
Start: 2023-09-14 | End: 2023-09-14

## 2023-09-14 RX ORDER — ONDANSETRON 2 MG/ML
4 INJECTION INTRAMUSCULAR; INTRAVENOUS EVERY 6 HOURS PRN
Status: DISCONTINUED | OUTPATIENT
Start: 2023-09-14 | End: 2023-09-15 | Stop reason: HOSPADM

## 2023-09-14 RX ORDER — GLYCOPYRROLATE 0.2 MG/ML
0.1 INJECTION, SOLUTION INTRAMUSCULAR; INTRAVENOUS ONCE
Status: COMPLETED | OUTPATIENT
Start: 2023-09-14 | End: 2023-09-14

## 2023-09-14 RX ORDER — FLUMAZENIL 0.1 MG/ML
0.2 INJECTION, SOLUTION INTRAVENOUS
Status: DISCONTINUED | OUTPATIENT
Start: 2023-09-14 | End: 2023-09-14

## 2023-09-14 RX ORDER — SODIUM CHLORIDE 9 MG/ML
1000 INJECTION, SOLUTION INTRAVENOUS CONTINUOUS
Status: DISCONTINUED | OUTPATIENT
Start: 2023-09-14 | End: 2023-09-14

## 2023-09-14 RX ORDER — METOPROLOL TARTRATE 1 MG/ML
2.5-5 INJECTION, SOLUTION INTRAVENOUS
Status: DISCONTINUED | OUTPATIENT
Start: 2023-09-14 | End: 2023-09-14

## 2023-09-14 RX ORDER — FENTANYL CITRATE 50 UG/ML
50 INJECTION, SOLUTION INTRAMUSCULAR; INTRAVENOUS ONCE
Status: COMPLETED | OUTPATIENT
Start: 2023-09-14 | End: 2023-09-14

## 2023-09-14 RX ORDER — FUROSEMIDE 10 MG/ML
40 INJECTION INTRAMUSCULAR; INTRAVENOUS
Status: COMPLETED | OUTPATIENT
Start: 2023-09-14 | End: 2023-09-14

## 2023-09-14 RX ORDER — LIDOCAINE 40 MG/G
CREAM TOPICAL
Status: DISCONTINUED | OUTPATIENT
Start: 2023-09-14 | End: 2023-09-15 | Stop reason: HOSPADM

## 2023-09-14 RX ORDER — POTASSIUM CHLORIDE 1500 MG/1
20 TABLET, EXTENDED RELEASE ORAL
Status: DISCONTINUED | OUTPATIENT
Start: 2023-09-14 | End: 2023-09-15 | Stop reason: HOSPADM

## 2023-09-14 RX ORDER — HEPARIN SODIUM 10000 [USP'U]/100ML
0-5000 INJECTION, SOLUTION INTRAVENOUS CONTINUOUS
Status: ACTIVE | OUTPATIENT
Start: 2023-09-14 | End: 2023-09-14

## 2023-09-14 RX ORDER — ONDANSETRON 4 MG/1
4 TABLET, ORALLY DISINTEGRATING ORAL EVERY 6 HOURS PRN
Status: DISCONTINUED | OUTPATIENT
Start: 2023-09-14 | End: 2023-09-15 | Stop reason: HOSPADM

## 2023-09-14 RX ORDER — AMOXICILLIN 250 MG
2 CAPSULE ORAL 2 TIMES DAILY PRN
Status: DISCONTINUED | OUTPATIENT
Start: 2023-09-14 | End: 2023-09-15 | Stop reason: HOSPADM

## 2023-09-14 RX ORDER — ACETAMINOPHEN 650 MG/1
650 SUPPOSITORY RECTAL EVERY 6 HOURS PRN
Status: DISCONTINUED | OUTPATIENT
Start: 2023-09-14 | End: 2023-09-15 | Stop reason: HOSPADM

## 2023-09-14 RX ORDER — MAGNESIUM SULFATE HEPTAHYDRATE 40 MG/ML
2 INJECTION, SOLUTION INTRAVENOUS
Status: COMPLETED | OUTPATIENT
Start: 2023-09-14 | End: 2023-09-14

## 2023-09-14 RX ORDER — LIDOCAINE 50 MG/G
OINTMENT TOPICAL ONCE
Status: COMPLETED | OUTPATIENT
Start: 2023-09-14 | End: 2023-09-14

## 2023-09-14 RX ORDER — HEPARIN SODIUM 10000 [USP'U]/100ML
0-5000 INJECTION, SOLUTION INTRAVENOUS CONTINUOUS
Status: DISCONTINUED | OUTPATIENT
Start: 2023-09-14 | End: 2023-09-14

## 2023-09-14 RX ORDER — NALOXONE HYDROCHLORIDE 0.4 MG/ML
0.4 INJECTION, SOLUTION INTRAMUSCULAR; INTRAVENOUS; SUBCUTANEOUS
Status: DISCONTINUED | OUTPATIENT
Start: 2023-09-14 | End: 2023-09-14

## 2023-09-14 RX ORDER — LIDOCAINE HYDROCHLORIDE 40 MG/ML
1.5 SOLUTION TOPICAL ONCE
Status: COMPLETED | OUTPATIENT
Start: 2023-09-14 | End: 2023-09-14

## 2023-09-14 RX ORDER — LEVOTHYROXINE SODIUM 88 UG/1
88 TABLET ORAL DAILY
Status: DISCONTINUED | OUTPATIENT
Start: 2023-09-14 | End: 2023-09-15 | Stop reason: HOSPADM

## 2023-09-14 RX ORDER — POTASSIUM CHLORIDE 1500 MG/1
40 TABLET, EXTENDED RELEASE ORAL
Status: DISCONTINUED | OUTPATIENT
Start: 2023-09-14 | End: 2023-09-15 | Stop reason: HOSPADM

## 2023-09-14 RX ORDER — POTASSIUM CHLORIDE 1500 MG/1
40 TABLET, EXTENDED RELEASE ORAL
Status: DISCONTINUED | OUTPATIENT
Start: 2023-09-14 | End: 2023-09-14

## 2023-09-14 RX ORDER — FENTANYL CITRATE 50 UG/ML
25 INJECTION, SOLUTION INTRAMUSCULAR; INTRAVENOUS
Status: DISCONTINUED | OUTPATIENT
Start: 2023-09-14 | End: 2023-09-14

## 2023-09-14 RX ORDER — NALOXONE HYDROCHLORIDE 0.4 MG/ML
0.2 INJECTION, SOLUTION INTRAMUSCULAR; INTRAVENOUS; SUBCUTANEOUS
Status: DISCONTINUED | OUTPATIENT
Start: 2023-09-14 | End: 2023-09-14

## 2023-09-14 RX ORDER — ACETAMINOPHEN 325 MG/1
650 TABLET ORAL EVERY 6 HOURS PRN
Status: DISCONTINUED | OUTPATIENT
Start: 2023-09-14 | End: 2023-09-15 | Stop reason: HOSPADM

## 2023-09-14 RX ORDER — MAGNESIUM OXIDE 400 MG/1
400 TABLET ORAL EVERY 4 HOURS
Status: COMPLETED | OUTPATIENT
Start: 2023-09-14 | End: 2023-09-14

## 2023-09-14 RX ORDER — AMOXICILLIN 250 MG
1 CAPSULE ORAL 2 TIMES DAILY PRN
Status: DISCONTINUED | OUTPATIENT
Start: 2023-09-14 | End: 2023-09-15 | Stop reason: HOSPADM

## 2023-09-14 RX ORDER — DEXTROSE MONOHYDRATE 25 G/50ML
9.5 INJECTION, SOLUTION INTRAVENOUS
Status: DISCONTINUED | OUTPATIENT
Start: 2023-09-14 | End: 2023-09-14

## 2023-09-14 RX ORDER — MAGNESIUM SULFATE HEPTAHYDRATE 40 MG/ML
2 INJECTION, SOLUTION INTRAVENOUS
Status: DISCONTINUED | OUTPATIENT
Start: 2023-09-14 | End: 2023-09-14

## 2023-09-14 RX ORDER — PROPOFOL 10 MG/ML
INJECTION, EMULSION INTRAVENOUS PRN
Status: DISCONTINUED | OUTPATIENT
Start: 2023-09-14 | End: 2023-09-14

## 2023-09-14 RX ADMIN — FUROSEMIDE 40 MG: 10 INJECTION, SOLUTION INTRAMUSCULAR; INTRAVENOUS at 08:35

## 2023-09-14 RX ADMIN — MIDAZOLAM 1 MG: 1 INJECTION INTRAMUSCULAR; INTRAVENOUS at 14:16

## 2023-09-14 RX ADMIN — APIXABAN 5 MG: 5 TABLET, FILM COATED ORAL at 20:11

## 2023-09-14 RX ADMIN — TOPICAL ANESTHETIC 1 ML: 200 SPRAY DENTAL; PERIODONTAL at 14:11

## 2023-09-14 RX ADMIN — LIDOCAINE HYDROCHLORIDE 1.5 ML: 40 SOLUTION TOPICAL at 13:44

## 2023-09-14 RX ADMIN — MIDAZOLAM 1 MG: 1 INJECTION INTRAMUSCULAR; INTRAVENOUS at 14:21

## 2023-09-14 RX ADMIN — GLYCOPYRROLATE 0.1 MG: 0.2 INJECTION, SOLUTION INTRAMUSCULAR; INTRAVENOUS at 13:46

## 2023-09-14 RX ADMIN — FENTANYL CITRATE 50 MCG: 50 INJECTION, SOLUTION INTRAMUSCULAR; INTRAVENOUS at 14:29

## 2023-09-14 RX ADMIN — MIDAZOLAM 1 MG: 1 INJECTION INTRAMUSCULAR; INTRAVENOUS at 14:14

## 2023-09-14 RX ADMIN — PROPOFOL 20 MG: 10 INJECTION, EMULSION INTRAVENOUS at 15:02

## 2023-09-14 RX ADMIN — Medication 400 MG: at 08:37

## 2023-09-14 RX ADMIN — FENTANYL CITRATE 50 MCG: 50 INJECTION, SOLUTION INTRAMUSCULAR; INTRAVENOUS at 14:14

## 2023-09-14 RX ADMIN — SODIUM CHLORIDE 1000 ML: 9 INJECTION, SOLUTION INTRAVENOUS at 13:30

## 2023-09-14 RX ADMIN — FUROSEMIDE 40 MG: 10 INJECTION, SOLUTION INTRAMUSCULAR; INTRAVENOUS at 15:45

## 2023-09-14 RX ADMIN — HEPARIN SODIUM 1350 UNITS/HR: 10000 INJECTION, SOLUTION INTRAVENOUS at 09:02

## 2023-09-14 RX ADMIN — HEPARIN SODIUM 1350 UNITS/HR: 10000 INJECTION, SOLUTION INTRAVENOUS at 18:52

## 2023-09-14 RX ADMIN — MAGNESIUM SULFATE HEPTAHYDRATE 2 G: 40 INJECTION, SOLUTION INTRAVENOUS at 12:18

## 2023-09-14 RX ADMIN — HUMAN ALBUMIN MICROSPHERES AND PERFLUTREN 3 ML: 10; .22 INJECTION, SOLUTION INTRAVENOUS at 13:18

## 2023-09-14 RX ADMIN — METOPROLOL TARTRATE 50 MG: 50 TABLET, FILM COATED ORAL at 08:37

## 2023-09-14 RX ADMIN — LEVOTHYROXINE SODIUM 88 MCG: 88 TABLET ORAL at 08:37

## 2023-09-14 RX ADMIN — Medication 400 MG: at 03:13

## 2023-09-14 RX ADMIN — PROPOFOL 20 MG: 10 INJECTION, EMULSION INTRAVENOUS at 15:01

## 2023-09-14 RX ADMIN — MIDAZOLAM 1 MG: 1 INJECTION INTRAMUSCULAR; INTRAVENOUS at 14:26

## 2023-09-14 RX ADMIN — PROPOFOL 40 MG: 10 INJECTION, EMULSION INTRAVENOUS at 14:59

## 2023-09-14 ASSESSMENT — ENCOUNTER SYMPTOMS
SEIZURES: 0
DYSRHYTHMIAS: 1

## 2023-09-14 ASSESSMENT — ACTIVITIES OF DAILY LIVING (ADL)
ADLS_ACUITY_SCORE: 35

## 2023-09-14 NOTE — PHARMACY-ADMISSION MEDICATION HISTORY
Admission medication history completed at [facility]. Please see Pharmacy - Admission Medication History note from 9/13/2023.

## 2023-09-14 NOTE — PRE-PROCEDURE
GENERAL PRE-PROCEDURE:   Procedure:  JUANY and possible cardioversion  Date/Time:  9/14/2023 2:16 PM    Verbal consent obtained?: Yes    Written consent obtained?: Yes    Risks and benefits: Risks, benefits and alternatives were discussed    Consent given by:  Patient  Patient states understanding of procedure being performed: Yes    Patient's understanding of procedure matches consent: Yes    Procedure consent matches procedure scheduled: Yes    Expected level of sedation:  Deep  Appropriately NPO:  Yes  ASA Class:  1  Mallampati  :  Grade 1- soft palate, uvula, tonsillar pillars, and posterior pharyngeal wall visible  Lungs:  Lungs clear with good breath sounds bilaterally  Heart:  A-fib  History & Physical reviewed:  History and physical reviewed and no updates needed  Statement of review:  I have reviewed the lab findings, diagnostic data, medications, and the plan for sedation

## 2023-09-14 NOTE — CONSULTS
Patient has BCBS through an employer.    Xarelto/Eliquis:  $30/mo. Upon receipt of RX Discharge [facility] can provide copay savings card from [employer] or [email or web address], respectively, to reduce this to $10/mo.    Chelsea Hyde  Pharmacy Technician/Liaison, Discharge Pharmacy   [phone] (voice or text)  [email or web address]

## 2023-09-14 NOTE — CONSULTS
[facility]    Cardiology Consultation     Dom Durham MRN#: [id number]   YOB: 1965 Age: 58 year old     Date of Admission:  9/13/2023    Consult Indication:  atrial fibrillation     Assessment & Plan     # Atrial fibrillation with RVR, [id number] 1-2.  New diagnosis.  Asymptomatic.  Chronicity unknown.  Risk factors include alcohol dependence.  # Dyspnea, likely secondary to mild decompensated heart failure.  Denies symptoms concerning for angina, suspect underlying tachycardia mediated process.  Troponin normal.  # HTN, stable  # EtOH dependence  # Obesity    -Discussed options for further evaluation and management  - Reviewed diagnosis of atrial fibrillation at length.  Discussed pathophysiology, options for further management including a rate control strategy versus rhythm control strategy.  Also discussed the issue of anticoagulation for the primary prevention of stroke.  - After an in-depth discussion, we will proceed with JUANY/DCCV.  Discussed risk/benefits, patient like to proceed.  - Discussed anticoagulation, risks of bleeding weighed against benefits of stroke prevention.  Patient denies issues with abnormal bleeding, would like to start anticoagulation.  Recommend continuing heparin GTT until this evening when apixaban can be started at 5 mg twice daily.  - Reasonable to continue metoprolol for now, will hold diltiazem gtt. in anticipation of cardioversion to avoid post cardioversion bradycardia  - Agree with continued diuresis, though seems close to euvolemic  - Cardiac telemetry.  Monitor electrolytes daily, maintain potassium greater than 4, magnesium greater than 2  - Further recommendations to follow  - Cardiology will follow    Please do not hesitate to page with any questions or concerns.     Edin Cano MD, [facility]  [facility]  Cardiology  September 14, 2023    Voice recognition software utilized.   High complexity     History of Present Illness      Patient is a pleasant 58-year-old male with a past medical history significant for alcohol dependence, obesity, hypertension, hyperlipidemia, hypothyroidism, chronic lymphedema, untreated sleep apnea, who was admitted 9/14/2023 with weeping ulcers in his legs, found to be in atrial fibrillation with RVR.    Patient reports that at baseline he is not as physically active as he would like to be.  His wife passed away in July and he has been drinking more alcohol than usual.  Last drink a few days ago.  Over the past several days he has noticed worsening dyspnea on exertion as well as orthopnea symptoms.  Denies any chest pain.  He was seen in the ED initial heart rate was in the 140s 150s beats per minute range, ECG demonstrated atrial fibrillation with RVR.  Labs were notable for potassium 4.1, creatinine 1.04, elevated D-dimer 0.72.  Troponin normal.  CT PE study was negative for PE, there is some findings suggesting mild congestive heart failure.  Lower extremity ultrasound is negative for DVT bilaterally.    Patient was started on diltiazem gtt. and metoprolol 25 mg twice daily, heparin GTT.    Past Medical History   Past Medical History:   Diagnosis Date    History of ankle fracture     5 times left, 2 times right    Hypertension     Thyroid dysfunction        Past Surgical History   Past Surgical History:   Procedure Laterality Date    ANKLE SURGERY  1984    COLONOSCOPY N/A 6/10/2016    Procedure: COLONOSCOPY;  Surgeon: Jarek Chaudhari MD;  Location:  GI    COLONOSCOPY N/A 2/9/2023    Procedure: COLONOSCOPY;  Surgeon: Vitaliy Huff, ;  Location:  GI    ESOPHAGOSCOPY, GASTROSCOPY, DUODENOSCOPY (EGD), COMBINED  5/24/2013    Procedure: COMBINED ESOPHAGOSCOPY, GASTROSCOPY, DUODENOSCOPY (EGD), BIOPSY SINGLE OR MULTIPLE;;  Surgeon: Timothy Jennings MD;  Location:  GI    LAPAROSCOPY DIAGNOSTIC (GENERAL)  8/14/2013    Procedure: LAPAROSCOPY DIAGNOSTIC (GENERAL);  Diagnostic Laparoscopy;   "Surgeon: Stu Arias MD;  Location: [facility]    SHOULDER SURGERY         Prior to Admission Medications   Prior to Admission Medications   Prescriptions Last Dose Informant Patient Reported? Taking?   amLODIPine (NORVASC) 10 MG tablet 9/13/2023 at 0530  No Yes   Sig: Take 1 tablet (10 mg) by mouth daily   levothyroxine (SYNTHROID/LEVOTHROID) 88 MCG tablet 9/13/2023 at 0530  No Yes   Sig: Take 1 tablet (88 mcg) by mouth daily      Facility-Administered Medications: None     Current Facility-Administered Medications   Medication Dose Route Frequency    furosemide  40 mg Intravenous BID    levothyroxine  88 mcg Oral Daily    metoprolol tartrate  50 mg Oral BID    sodium chloride (PF)  3 mL Intracatheter Q8H    sodium chloride (PF)  3 mL Intravenous Q8H     Current Facility-Administered Medications   Medication Last Rate    [Held by provider] [id number]      - MEDICATION INSTRUCTIONS -      heparin 1,350 Units/hr (09/14/23 0902)    - MEDICATION INSTRUCTIONS -      Reason anticoagulant not prescribed for atrial fibrillation      - MEDICATION INSTRUCTIONS -       Allergies   Allergies   Allergen Reactions    Amoxicillin-Pot Clavulanate Other (See Comments)     \"I was so weak and just felt like dying\"    Dilaudid [Hydromorphone] Nausea and Vomiting     \"ok with Percocet\"    Hydrocodone Nausea     \"also did not do anything for my pain\"       Social History    reports that he has never smoked. He has never used smokeless tobacco. He reports current alcohol use of about 6.0 standard drinks of alcohol per week. He reports that he does not use drugs.    Family History   I have reviewed this patient's family history and updated it with pertinent information if needed.  Family History   Problem Relation Age of Onset    Hypertension Father     Prostate Cancer Father     Cancer Father         primary not known    Thyroid Disease Sister     Liver Disease Brother     Liver Disease Brother     Colon Cancer No family hx of       "     Review of Systems   A comprehensive review of system was performed and is negative other than that noted in the HPI or here.     Physical Exam   Vital Signs with Ranges  Temp:  [98.2  F (36.8  C)] 98.2  F (36.8  C)  Pulse:  [] 138  Resp:  [9-28] 16  BP: ()/() 113/69  SpO2:  [90 %-100 %] 99 %  Wt Readings from Last 4 Encounters:   23 103.2 kg (227 lb 8 oz)   23 100.2 kg (221 lb)   23 102.1 kg (225 lb)   10/15/21 100.2 kg (220 lb 12.8 oz)     I/O last 3 completed shifts:  In: -   Out: 1 [Urine:1]    Vital signs were personally reviewed:  Temperatures:  Current - Temp: 98.2  F (36.8  C); Max - Temp  Av.2  F (36.8  C)  Min: 98.2  F (36.8  C)  Max: 98.2  F (36.8  C)  Respiration range: Resp  Av.9  Min: 9  Max: 28  Pulse range: Pulse  Av.8  Min: 70  Max: 161  Blood pressure range: Systolic (24hrs), Av , Min:95 , Max:130   ; Diastolic (24hrs), Av, Min:59, Max:106    Pulse oximetry range: SpO2  Av.4 %  Min: 90 %  Max: 100 %    Intake/Output Summary (Last 24 hours) at 2023 1128  Last data filed at 2023 1108  Gross per 24 hour   Intake 3 ml   Output 2151 ml   Net -2148 ml     227 lbs 8 oz  Body mass index is 30.43 kg/m .   Body surface area is 2.3 meters squared.    Physical Exam:   General/Constitutional: appears stated age, in no apparent distress, appears to be well nourished  Head: normocephalic, atraumatic  Eyes - No scleral icterus  Neck: supple, trachea midline  Respiratory: clear to auscultation bilaterally  Cardiovascular: JVP normal, irregularly irregular, tachycardic, normal S1 and S2, no S3, S4, no murmur appreciated, no lower extremity edema  Gastrointestinal: no guarding, non-rigid   Neurologic: awake, alert, moves all extremities  Skin: no jaundice, warm on limited exam  Psychiatric: affect is normal, answers questions appropriately, oriented to self and place    Laboratory tests personally reviewed:   [facility]  Recent Labs   Lab  09/14/23  0556 09/14/23  0021 09/13/23  0844     --  140   POTASSIUM 4.1  --  4.1   CHLORIDE 106  --  103   CO2 20*  --  23   ANIONGAP 12  --  14   *  --  104*   BUN 9.2  --  9.0   CR 1.03  --  1.04   GFRESTIMATED 83  --  82   SHELIA 9.4  --  9.7   MAG  --  1.8  --    PROTTOTAL 6.1*  --  6.2*   ALBUMIN 3.8  --  4.1   BILITOTAL 1.5*  --  2.0*   ALKPHOS 68  --  74   AST 60*  --  46*   ALT 54  --  52     CBC  Recent Labs   Lab 09/14/23  0556 09/13/23  0844   WBC 8.6 9.8   RBC 4.96 4.86   HGB 16.5 15.9   HCT 48.6 47.1   MCV 98 97   MCH 33.3* 32.7   MCHC 34.0 33.8   RDW 12.9 13.0    172     INRNo lab results found in last 7 days.  Lab Results   Component Value Date    TROPI <0.015 10/05/2017     Recent Labs   Lab Test 01/23/23  0804 10/08/21  0941 08/24/15  1735   CHOL 198 229* 195   HDL 47 50 40*   * 151* 142*   TRIG 90 138 65   CHOLHDLRATIO  --   --  4.9     No results found for: A1C  TSH   Date Value Ref Range Status   09/14/2023 3.41 0.30 - 4.20 uIU/mL Final   10/08/2021 3.18 0.40 - 4.00 mU/L Final   12/14/2018 2.76 0.40 - 4.00 mU/L Final       Imaging:  No results found for this or any previous visit (from the past 4320 hour(s)).     Clinically Significant Risk Factors Present on Admission                  # Hypertension: Noted on problem list                Cardiac Arrhythmia: Atrial fibrillation: Paroxysmal  Fluid overload, unspecified

## 2023-09-14 NOTE — PROGRESS NOTES
Care Suites Procedure Nursing Note    Patient Information  Name: Dom Durham  Age: 58 year old    Procedure  Procedure: JUANY and cardioversion. JUANY under moderate IV sedation.   Concerns/abnormal assessment: None  If abnormal assessment, provider notified: N/A  Plan/Other: Proceed with recovery as planned    Vanda Roe RN

## 2023-09-14 NOTE — H&P
[facility]    History and Physical - Hospitalist Service       Date of Admission:  9/13/2023    Assessment & Plan      Dom Durham is a 58 year old male with past medical history of hypertension and hypothyroidism who presented initially to [facility] ER for evaluation of shortness of breath and leg swellings.  He was found to be in A-fib with RVR.    # A-fib with RVR, new diagnosis  -He presented with shortness of breath, orthopnea and lower extremity swelling for the last week  -Denies palpitations  -Found to be in A-fib with RVR  -BMP within normal limits, potassium 4.1, magnesium 1.8, troponin 11  -Started on Cardizem drip in the ER without better heart rate control despite maximal titration  -ER attending discussed with the cardiology on-call, Dr. Pickard who recommended a dose of digoxin 500 mcg IV; he was also given 1 dose of metoprolol 5 mg IV and 1 dose of metoprolol 25 mg p.o.  -Eventually his heart rate improved; Cardizem drip ran out on route and he was off Cardizem drip at the time of my examination, telemetry at that time showed A-fib with heart rate in 70s to 90s  -Monitor on telemetry  -Check TSH  -Start metoprolol 25 mg p.o. twice daily  -Cardizem drip ordered but discussed with bedside RN to start on if heart rate persistently above 100 overnight  -Echocardiogram  -check proBNP  -Cardiology consult  -Continue heparin drip  -We will likely need to be switched to DOAC; [id number] score is 2 for hypertension and likely CHF  -Pharmacy liaison consult    # Suspected heart failure, possible tachycardia mediated  -Resented with shortness of breath, orthopnea, leg swellings  -This is likely in the setting of A-fib with RVR  CT of the chest show evidence of interstitial edema and small bilateral pleural effusion  -Received Lasix 40 mg IV in ER with good diuresis  -Continue Lasix 40 mg IV twice daily for 2 more doses  -Strict input and output/daily  weights  -Echocardiogram  -Monitor BMP while on diuretics    # Hypertension  -PTA on Norvasc, will hold it for now  -Started on metoprolol 25 mg p.o. twice daily  -Monitor blood pressure    # Hypothyroidism  -Resume prior to admission levothyroxine  -Check TSH       Diet: NPO per Anesthesia Guidelines for Procedure/Surgery Except for: Meds    DVT Prophylaxis: Heparin drip.  Sawyer Catheter: Not present  Lines: None     Cardiac Monitoring: ACTIVE order. Indication: Tachyarrhythmias, acute (48 hours)  Code Status:  Full code    Clinically Significant Risk Factors Present on Admission                  # Hypertension: Noted on problem list                 Disposition Plan      Expected Discharge Date: 09/16/2023                  Marily Junior MD  Hospitalist Service  [facility]  Securely message with Violet (more info)  Text page via [facility] Paging/Directory     ______________________________________________________________________    Chief Complaint   Shortness of breath and leg swellings    History is obtained from the patient who is a good historian; reviewed the ER notes from [facility].  He was transferred to [facility].    History of Present Illness   Dom Durham is a 58 year old male with past medical history of hypertension and hypothyroidism who presented initially to [facility] ER for evaluation of shortness of breath and leg swellings.  He reports that he started feeling short of breath approximately 1 week ago and got progressively worse.  He states that in the last couple of nights he could not lay down to sleep and he had to get up.  He also reports bilateral swelling of his legs especially his calves, but no pain.  He also reports a mild chest pain, rated as a 1 out of 10 intensity, described as stabbing.  He denies palpitations.  He denies any headache, no fever, no coughing, no nausea no vomiting.  He denies any abdominal pain, no  diarrhea, no constipation.  He googled his symptoms and decided to go to ER for further evaluation.  In ER he was found to be tachycardic; EKG showed A-fib with RVR.  Last Comprehensive Metabolic Panel:  Lab Results   Component Value Date     09/13/2023    POTASSIUM 4.1 09/13/2023    CHLORIDE 103 09/13/2023    CO2 23 09/13/2023    ANIONGAP 14 09/13/2023     (H) 09/13/2023    BUN 9.0 09/13/2023    CR 1.04 09/13/2023    GFRESTIMATED 82 09/13/2023    SHELIA 9.7 09/13/2023      CBC RESULTS:   Recent Labs   Lab Test 09/13/23  0844   WBC 9.8   RBC 4.86   HGB 15.9   HCT 47.1   MCV 97   MCH 32.7   MCHC 33.8   RDW 13.0         Liver Function Studies -   Recent Labs   Lab Test 09/13/23  0844   PROTTOTAL 6.2*   ALBUMIN 4.1   BILITOTAL 2.0*   ALKPHOS 74   AST 46*   ALT 52      Dimer 0.72    CXR-  No focal consolidation, pleural effusion or pneumothorax. Apparent increased heart size is likely related to technique, although clinically correlate.    CT chest- no PE, Sequelae of congestive heart failure including mild interstitial pulmonary edema, small right and trace left pleural effusions, cardiomegaly, and early filling of the hepatic veins.    In ER he was given a bolus of Cardizem followed by Cardizem infusion, but her heart rate was not better controlled despite maximum rate of Cardizem drip; ER attending discussed with cardiology on-call; the patient was given 1 dose of digoxin 500 mcg IV; he was also given Lopressor 5 mg IV followed by Lopressor 25 mg p.o..  His heart rate eventually improved to 100s.  He was also started on heparin drip.  He was also given 1 dose of Lasix 40 mg IV.  The patient reported good diuresis and improvement of his symptoms with above intervention.  He was transferred to [facility].  I saw the patient after he arrived in CICU.  He is Cardizem drip ran out while he was in the ambulance.  At the time of my examination he was off Cardizem drip.  Telemetry showed  A-fib with heart rate between 70s to 90s.          Past Medical History    Past Medical History:   Diagnosis Date    History of ankle fracture     5 times left, 2 times right    Hypertension     Thyroid dysfunction        Past Surgical History   Past Surgical History:   Procedure Laterality Date    ANKLE SURGERY  1984    COLONOSCOPY N/A 6/10/2016    Procedure: COLONOSCOPY;  Surgeon: Jarek Chaudhari MD;  Location:  GI    COLONOSCOPY N/A 2/9/2023    Procedure: COLONOSCOPY;  Surgeon: Vitaliy Huff DO;  Location: [facility]    ESOPHAGOSCOPY, GASTROSCOPY, DUODENOSCOPY (EGD), COMBINED  5/24/2013    Procedure: COMBINED ESOPHAGOSCOPY, GASTROSCOPY, DUODENOSCOPY (EGD), BIOPSY SINGLE OR MULTIPLE;;  Surgeon: Timothy Jennings MD;  Location: [facility]    LAPAROSCOPY DIAGNOSTIC (GENERAL)  8/14/2013    Procedure: LAPAROSCOPY DIAGNOSTIC (GENERAL);  Diagnostic Laparoscopy;  Surgeon: Stu Arias MD;  Location: [facility] OR    SHOULDER SURGERY         Prior to Admission Medications   Prior to Admission Medications   Prescriptions Last Dose Informant Patient Reported? Taking?   amLODIPine (NORVASC) 10 MG tablet   No No   Sig: Take 1 tablet (10 mg) by mouth daily   levothyroxine (SYNTHROID/LEVOTHROID) 88 MCG tablet   No No   Sig: Take 1 tablet (88 mcg) by mouth daily      Facility-Administered Medications: None        Review of Systems    The 10 point Review of Systems is negative other than noted in the HPI or here.     Social History   I have reviewed this patient's social history and updated it with pertinent information if needed.  Social History     Tobacco Use    Smoking status: Never    Smokeless tobacco: Never   Vaping Use    Vaping Use: Never used   Substance Use Topics    Alcohol use: Yes     Alcohol/week: 6.0 standard drinks of alcohol     Types: 6 Shots of liquor per week    Drug use: No         Family History   I have reviewed this patient's family history and updated it with pertinent information if needed.  Family  "History   Problem Relation Age of Onset    Hypertension Father     Prostate Cancer Father     Cancer Father         primary not known    Thyroid Disease Sister     Liver Disease Brother     Liver Disease Brother     Colon Cancer No family hx of          Allergies   Allergies   Allergen Reactions    Amoxicillin-Pot Clavulanate Other (See Comments)     \"I was so weak and just felt like dying\"    Dilaudid [Hydromorphone] Nausea and Vomiting     \"ok with Percocet\"    Hydrocodone Nausea     \"also did not do anything for my pain\"        Physical Exam   Vital Signs:                    Weight: 0 lbs 0 oz    General: Awake, alert, no acute distress  HEENT: Head is normocephalic, atraumatic, pupils are equally round and reactive to light  RESP: Bilateral air entry no wheezing, no rales, no crackles  CV: Irregularly irregular, no murmurs, no rubs  GI: Abdomen is soft, nontender, nondistended, bowel sounds are present  EXTREM: Trace edema bilateral lower extremities no calf tenderness  NEURO: AAOx3, no focal neurological deficits  PSYCH: Normal mood, normal affect    Medical Decision Making       MANAGEMENT DISCUSSED with the following over the past 24 hours: the patient, RN   NOTE(S)/MEDICAL RECORDS REVIEWED over the past 24 hours: ER notes  Tests ORDERED & REVIEWED in the past 24 hours:  Tests REVIEWED in the past 24 hours:  - BMP  - CBC  - Ddimer  - LFTs  - Magnesium  - Troponin  Tests personally interpreted in the past 24 hours:  - CHEST XRAY showing as above  - CHEST CT showing as above       Data     I have personally reviewed the following data over the past 24 hrs:    9.8  \   15.9   / 172     140 103 9.0 /  104 (H)   4.1 23 1.04 \     ALT: 52 AST: 46 (H) AP: 74 TBILI: 2.0 (H)   ALB: 4.1 TOT PROTEIN: 6.2 (L) LIPASE: N/A     Trop: 11 BNP: N/A     INR:  N/A PTT:  N/A   D-dimer:  0.72 (H) Fibrinogen:  N/A       Imaging results reviewed over the past 24 hrs:   Recent Results (from the past 24 hour(s))   XR Chest Port 1 View "    Narrative    CHEST ONE VIEW  9/13/2023 8:55 AM     HISTORY: Short of air.    COMPARISON: Chest radiograph 10/8/2021.      Impression    IMPRESSION: No focal consolidation, pleural effusion or pneumothorax.  Apparent increased heart size is likely related to technique, although  clinically correlate. Left shoulder rotator cuff surgical anchor.  Remote left rib fracture.     ARMANDO MACHADO MD         SYSTEM ID:  [id number]   CT Chest Pulmonary Embolism w Contrast    Narrative    CT CHEST PULMONARY EMBOLISM WITH CONTRAST 9/13/2023 10:50 AM    CLINICAL HISTORY: Chest pain. Dyspnea, orthopnea, PND, bilateral calf  swelling for 2 days.     TECHNIQUE: CT angiogram chest during arterial phase injection IV  contrast. 2D and 3D MIP reconstructions were performed by the CT  technologist. Dose reduction techniques were used.     CONTRAST: Isovue-370, 75 mL    COMPARISON: Chest radiograph 9/13/2023.    FINDINGS:  ANGIOGRAM CHEST: No pulmonary embolus. Ascending thoracic aorta  measures up to 4.2 cm, similar to prior, which is not well-opacified.    LOWER NECK: Unremarkable.     LUNGS AND PLEURA: Mild interstitial pulmonary edema. Small right and  trace left pleural effusions. No focal consolidation. Mild bronchial  wall thickening. No suspicious pulmonary nodule.    AIRWAYS: Patent.    HEART: Enlarged heart. No pericardial effusion. Mild coronary artery  calcification.    MEDIASTINUM/AXILLAE: Few scattered prominent mediastinal lymph nodes  measuring up to 0.9 cm are favored to be reactive. Calcified lymph  nodes.    UPPER ABDOMEN: Early filling of the hepatic veins. No acute findings  within the upper abdomen.    MUSCULOSKELETAL: Remote rib fractures. No aggressive osseous lesion.  Mild degenerative changes of the spine.      Impression    IMPRESSION:  1.  No acute pulmonary embolus.  2.  Sequelae of congestive heart failure including mild interstitial  pulmonary edema, small right and trace left pleural  effusions,  cardiomegaly, and early filling of the hepatic veins.    ARMANDO MACHADO MD         SYSTEM ID:  [id number]

## 2023-09-14 NOTE — ANESTHESIA POSTPROCEDURE EVALUATION
Patient: oDm Durham    Procedure: Procedure(s):  Anesthesia cardioversion       Anesthesia Type:  General    Note:     Postop Pain Control: Uneventful            Sign Out: Well controlled pain   PONV: No   Neuro/Psych: Uneventful            Sign Out: Acceptable/Baseline neuro status   Airway/Respiratory: Uneventful            Sign Out: Acceptable/Baseline resp. status   CV/Hemodynamics: Uneventful            Sign Out: Acceptable CV status; No obvious hypovolemia; No obvious fluid overload   Other NRE: NONE   DID A NON-ROUTINE EVENT OCCUR? No           Last vitals:  Vitals:    09/14/23 1510 09/14/23 1520 09/14/23 1530   BP: 101/74 96/76 104/70   Pulse: 57 57 52   Resp: 16 11 19   Temp:      SpO2: 93% 96% 96%       Electronically Signed By: Salvador Bruce MD  September 14, 2023  3:33 PM

## 2023-09-14 NOTE — PLAN OF CARE
Pt here with chest discomfort, SOB and leg swelling. A&Ox4. Neuros and CMS intact. VSS on RA. Tele Afib CVR. NPO diet. Up with SBA. Denies pain or CP. Pt scoring green on the Aggression Stop Light Tool. Plan for echo in the AM. Discharge pending.

## 2023-09-14 NOTE — PROGRESS NOTES
"Non-billing note.  Patient seen and examined.  History and physical by  reviewed.  Patient presenting last night with new onset A-fib with RVR and suspected new heart failure most likely tachycardia induced.    Patient was started on Cardizem drip overnight and heart rate was better controlled however he is still tachycardic specially with minimal activities.  Overall he feels slightly better.    Physical exam  Vital signs:  Temp: 98.2  F (36.8  C) Temp src: Oral BP: 113/69 Pulse: (!) 138   Resp: 16 SpO2: 99 % O2 Device: None (Room air)     Weight: 103.2 kg (227 lb 8 oz)  Estimated body mass index is 30.43 kg/m  as calculated from the following:    Height as of 1/23/23: 1.842 m (6' 0.5\").    Weight as of this encounter: 103.2 kg (227 lb 8 oz).    Not in any obvious distress  Cardiovascular exam reveals tachycardia, irregular rhythm, 1+ edema  Respiratory: Clear to auscultation bilaterally, normal effort of breathing, nonspecific tenderness around well both lower lateral rib cage  GI: Soft, nontender, bowel sound normoactive.    Labs reviewed, CT with evidence of pulmonary edema.  No PE.    Impression/plan  A-fib-with RVR-new onset  Suspected heart failure, tachycardia induced  Essential hypertension  At risk alcohol use  Mildly elevated LFTs  -Metoprolol 50 mg twice a day  -Resume Cardizem drip if heart rate not adequately controlled with above  -Continue heparin drip  -Await echo and cardiology evaluation  -Patient does have a history of prior rib fracture and has mild tenderness along both rib cages, does not appear like this is a new problem  -Also discussed with him about his alcohol use . and counseled cessation.  Appears to have been heavy in the past month especially after the death of his wife.  Monitor closely, no evidence of withdrawal at this time.  Recheck LFTs in the morning.           "

## 2023-09-14 NOTE — ANESTHESIA PREPROCEDURE EVALUATION
"Anesthesia Pre-Procedure Evaluation    Patient: Dom Durham   MRN: [id number] : 1965        Procedure : Procedure(s):  Anesthesia cardioversion          Past Medical History:   Diagnosis Date    History of ankle fracture     5 times left, 2 times right    Hypertension     Thyroid dysfunction       Past Surgical History:   Procedure Laterality Date    ANKLE SURGERY      COLONOSCOPY N/A 6/10/2016    Procedure: COLONOSCOPY;  Surgeon: Jarek Chaudhari MD;  Location: [facility]    COLONOSCOPY N/A 2023    Procedure: COLONOSCOPY;  Surgeon: Vitaliy Huff DO;  Location: [facility]    ESOPHAGOSCOPY, GASTROSCOPY, DUODENOSCOPY (EGD), COMBINED  2013    Procedure: COMBINED ESOPHAGOSCOPY, GASTROSCOPY, DUODENOSCOPY (EGD), BIOPSY SINGLE OR MULTIPLE;;  Surgeon: Timothy Jennings MD;  Location: [facility]    LAPAROSCOPY DIAGNOSTIC (GENERAL)  2013    Procedure: LAPAROSCOPY DIAGNOSTIC (GENERAL);  Diagnostic Laparoscopy;  Surgeon: Stu Arias MD;  Location: [facility]    SHOULDER SURGERY        Allergies   Allergen Reactions    Amoxicillin-Pot Clavulanate Other (See Comments)     \"I was so weak and just felt like dying\"    Dilaudid [Hydromorphone] Nausea and Vomiting     \"ok with Percocet\"    Hydrocodone Nausea     \"also did not do anything for my pain\"      Social History     Tobacco Use    Smoking status: Never    Smokeless tobacco: Never   Substance Use Topics    Alcohol use: Yes     Alcohol/week: 6.0 standard drinks of alcohol     Types: 6 Shots of liquor per week      Wt Readings from Last 1 Encounters:   23 103.2 kg (227 lb 8 oz)      EKG  Atrial fibrillation with rapid ventricular response   Rightward axis   Nonspecific T wave abnormality   Abnormal ECG   No previous ECGs available   Anesthesia Evaluation   Pt has had prior anesthetic.     No history of anesthetic complications       ROS/MED HX  ENT/Pulmonary: Comment: Hearing loss    (+) sleep apnea (untreated), doesn't use CPAP,            "                       (-) recent URI   Neurologic:    (-) no seizures, no CVA and migraines   Cardiovascular: Comment: Presented with dyspnea/ lower extremity swelling - found to be in afib with RVR, likely tachycardia induced cardiomyopathy     EF approx 30% by JUANY 9/14/2023    Chronic lymphedema lower extremities     (+) Dyslipidemia hypertension- -   -  - -      CHF                  dysrhythmias, a-fib,             METS/Exercise Tolerance:     Hematologic:  - neg hematologic  ROS     Musculoskeletal:  - neg musculoskeletal ROS     GI/Hepatic:     (+)             liver disease (fatty liver; cirrhosis),    (-) GERD   Renal/Genitourinary:       Endo:     (+)          thyroid problem, hypothyroidism,    Obesity,       Psychiatric/Substance Use: Comment: ETOH dependence      Infectious Disease:       Malignancy:       Other:            Physical Exam    Airway        Mallampati: III   TM distance: > 3 FB   Neck ROM: full   Mouth opening: > 3 cm    Respiratory Devices and Support         Dental       (+) Minor Abnormalities - some fillings, tiny chips      Cardiovascular          Rhythm and rate: irregular and tachycardia     Pulmonary   pulmonary exam normal        breath sounds clear to auscultation           OUTSIDE LABS:  CBC:   Lab Results   Component Value Date    WBC 8.6 09/14/2023    WBC 9.8 09/13/2023    HGB 16.5 09/14/2023    HGB 15.9 09/13/2023    HCT 48.6 09/14/2023    HCT 47.1 09/13/2023     09/14/2023     09/13/2023     BMP:   Lab Results   Component Value Date     09/14/2023     09/13/2023    POTASSIUM 4.1 09/14/2023    POTASSIUM 4.1 09/13/2023    CHLORIDE 106 09/14/2023    CHLORIDE 103 09/13/2023    CO2 20 (L) 09/14/2023    CO2 23 09/13/2023    BUN 9.2 09/14/2023    BUN 9.0 09/13/2023    CR 1.03 09/14/2023    CR 1.04 09/13/2023     (H) 09/14/2023     (H) 09/13/2023     COAGS:   Lab Results   Component Value Date    INR 1.30 (H) 09/14/2023     POC: No results found  for: BGM, HCG, HCGS  HEPATIC:   Lab Results   Component Value Date    ALBUMIN 3.8 09/14/2023    PROTTOTAL 6.1 (L) 09/14/2023    ALT 54 09/14/2023    AST 60 (H) 09/14/2023    ALKPHOS 68 09/14/2023    BILITOTAL 1.5 (H) 09/14/2023     OTHER:   Lab Results   Component Value Date    SHELIA 9.4 09/14/2023    MAG 1.8 09/14/2023    LIPASE 154 08/24/2015    AMYLASE 84 08/24/2015    TSH 3.41 09/14/2023    T4 0.88 09/28/2015    CRP <2.9 10/08/2021       Anesthesia Plan    ASA Status:  3    NPO Status:  NPO Appropriate    Anesthesia Type: General.   Induction: Propofol.           Consents    Anesthesia Plan(s) and associated risks, benefits, and realistic alternatives discussed. Questions answered and patient/representative(s) expressed understanding.     - Discussed:     - Discussed with:  Patient            Postoperative Care    Pain management: IV analgesics.        Comments:    Other Comments: Patient had 4 mg of versed and 100 mcg fentanyl for JUANY            Salvador Bruce MD

## 2023-09-14 NOTE — PROGRESS NOTES
[facility] Suit[facility] Procedure Nursing Note    Patient Information  Name: Dom Durham  Age: 58 year old    Procedure  Procedure: JUANY and cardioversion. JUANY done under moderate IV sedation. Pt was agitated at times. JUANY showed no clots, so proceeded with cardioversion. Pt tolerated two shocks of 150J well, under sedation guidance from anesthesia team. Pt a fib rhythm prior to cardioversion, and now in normal sinus rhythm.   Procedure start time: 1409  Procedure complete time: 1510  Concerns/abnormal assessment: None  If abnormal assessment, provider notified: N/A  Plan/Other: Proceed with recovery as planned.    Vanda Roe RN

## 2023-09-14 NOTE — PROGRESS NOTES
"Order received for JUANY with possible DCCV. Per pt's bedside RN pt able to consent and has been NPO, pt stable, HR \"labile, he can feel it when it's high\". MD note mentions recent increase in ETOH use over past month without signs of withdrawal.  Scheduled for 1400 today, anesthesia, echo and Dr. Akbar updated.  "

## 2023-09-14 NOTE — PROCEDURES
[facility]    Procedure: EP Cardioversion External    Date/Time: 9/14/2023 3:00 PM    Performed by: Toñito Akbar MD  Authorized by: Edin Cano MD      UNIVERSAL PROTOCOL   Site Marked: NA  Prior Images Obtained and Reviewed:  Yes  Required items: Required blood products, implants, devices and special equipment available    Patient identity confirmed:  Verbally with patient, arm band, provided demographic data and hospital-assigned identification number  Patient was reevaluated immediately before administering moderate or deep sedation or anesthesia  Confirmation Checklist:  Patient's identity using two indicators, relevant allergies, procedure was appropriate and matched the consent or emergent situation and correct equipment/implants were available  Time out: Immediately prior to the procedure a time out was called    Universal Protocol: the Joint Commission Universal Protocol was followed    Preparation: Patient was prepped and draped in usual sterile fashion       ANESTHESIA    Anesthesia was administered and monitored by anesthesiology.  See anesthesia documentation for details.    SEDATION  Patient Sedated: Yes    Sedation Type:  Deep  Sedation:  Propofol  Vital signs: Vital signs monitored during sedation      PROCEDURE DETAILS  Cardioversion basis: elective  Indications: failure of anti-arrhythmic medications  Pre-procedure rhythm: atrial fibrillation  Patient position: patient was placed in a supine position  Chest area: chest area exposed  Electrodes: pads  Electrodes placed: anterior-posterior  Number of attempts: 2    Details of Attempts:  Initial shock with 150 J, synchronized, pads AP, was unsuccessful.    Second shock with 200 J, synchronized, pads AP, was successful.   Post-procedure rhythm: normal sinus rhythm  Complications: no complications      PROCEDURE    Patient Tolerance:  Patient tolerated the procedure well with no immediate complications

## 2023-09-14 NOTE — PROGRESS NOTES
Pt here in spec echo fore JUANY and cardioversion.  VSS, reports some rib discomfort.  Consent is signed on chart.   Denies any food today, sips of liquid only with meds.  No diff swallowing, no false or removal teeth.  Possible loose cap on left upper molar per pt.   Heparin infusing, magnesium replaced.  Pt is in rapid afib at this time.

## 2023-09-14 NOTE — ANESTHESIA CARE TRANSFER NOTE
/  Patient: Dom Durham    Procedure: Procedure(s):  Anesthesia cardioversion       Diagnosis: Diagnosis unknown [R69]  Diagnosis Additional Information: No value filed.    Anesthesia Type:   No value filed.     Note:    Oropharynx: oropharynx clear of all foreign objects and spontaneously breathing  Level of Consciousness: awake  Oxygen Supplementation: nasal cannula  Level of Supplemental Oxygen (L/min / FiO2): 4  Independent Airway: airway patency satisfactory and stable  Dentition: dentition unchanged  Vital Signs Stable: post-procedure vital signs reviewed and stable  Report to RN Given: handoff report given  Patient transferred to: Phase II (special  echo)    Handoff Report: Identifed the Patient, Identified the Reponsible Provider, Reviewed the pertinent medical history, Discussed the surgical course, Reviewed Intra-OP anesthesia mangement and issues during anesthesia, Set expectations for post-procedure period and Allowed opportunity for questions and acknowledgement of understanding      Vitals:  Vitals Value Taken Time   BP 98/82    Temp     Pulse 62    Resp 16    SpO2 94 %        Electronically Signed By: KASIE Adams CRNA  September 14, 2023  3:14 PM

## 2023-09-14 NOTE — UTILIZATION REVIEW
Admission Status; Secondary Review Determination       Under the authority of the Utilization Management Committee, the utilization review process indicated a secondary review on the above patient. The review outcome is based on review of the medical records, discussions with staff, and applying clinical experience noted on the date of the review.     (x) Inpatient Status Appropriate - This patient's medical care is consistent with medical management for inpatient care and reasonable inpatient medical practice.     RATIONALE FOR DETERMINATION     Patient requires inpatient admission versus short stay observation or outpatient treatment for the following reasons:  58 year old male with past medical history of etoh, htn, and obesity who presented to the outside ED on 9/13 with weeping ulcers and swelling in his legs and found to have atrial fibrillation with rapid ventricular response. His heart rates was 154 on presentation. He was quickly maxed out on his diltiazem infusion, and then given a dose of digoxin before being transferred to [facility]. He was also heparinized. By the time of arrival, his heart rates were controlled and he was off the diltiazem drip. However, the patient was also determined to be in heart failure exacerbation and placed on bid diuretics, which is an indication for inpatient management, and thus would agree with inpatient stay classification. There is now plans for JUANY guided cardioversion, and heart rates are still documented as 138 now, consistent with suboptimal rate control. Continue inpatient stay for uncontrolled afib with RVR and acute congestive heart failure and need for bid diuretics, further rate control, JUANY guided CV.    The expected length of stay at the time of admission was more than 2 nights because of the severity of illness, intensity of service provided, and risk for adverse outcome. Inpatient admission is appropriate.         This document was  produced using voice recognition software       The information on this document is developed by the utilization review team in order for the business office to ensure compliance. This only denotes the appropriateness of proper admission status and does not reflect the quality of care rendered.   The definitions of Inpatient Status and Observation Status used in making the determination above are those provided in the CMS Coverage Manual, Chapter 1 and Chapter 6, section 70.4.   Sincerely,   Toñito Cline DO  Physician Advisor  [facility].       04-Sep-2017 22:50

## 2023-09-14 NOTE — PLAN OF CARE
Goal Outcome Evaluation:      Plan of Care Reviewed With: patient      Patient is having a-fib 70's-140's gave oral medications this morning, NPO for JUANY and cardioversion. Blood pressure is table on heparin drip. Stand by assist.

## 2023-09-15 ENCOUNTER — APPOINTMENT (OUTPATIENT)
Dept: CARDIOLOGY | Facility: CLINIC | Age: 58
End: 2023-09-15
Attending: INTERNAL MEDICINE
Payer: COMMERCIAL

## 2023-09-15 VITALS
TEMPERATURE: 97.7 F | WEIGHT: 217.5 LBS | DIASTOLIC BLOOD PRESSURE: 70 MMHG | RESPIRATION RATE: 18 BRPM | SYSTOLIC BLOOD PRESSURE: 98 MMHG | OXYGEN SATURATION: 97 % | BODY MASS INDEX: 29.09 KG/M2 | HEART RATE: 63 BPM

## 2023-09-15 LAB
ALBUMIN SERPL BCG-MCNC: 4.1 G/DL (ref 3.5–5.2)
ALP SERPL-CCNC: 73 U/L (ref 40–129)
ALT SERPL W P-5'-P-CCNC: 64 U/L (ref 0–70)
AST SERPL W P-5'-P-CCNC: 60 U/L (ref 0–45)
BILIRUB DIRECT SERPL-MCNC: 0.37 MG/DL (ref 0–0.3)
BILIRUB SERPL-MCNC: 1.1 MG/DL
MAGNESIUM SERPL-MCNC: 2 MG/DL (ref 1.7–2.3)
POTASSIUM SERPL-SCNC: 4.2 MMOL/L (ref 3.4–5.3)
PROT SERPL-MCNC: 6.5 G/DL (ref 6.4–8.3)

## 2023-09-15 PROCEDURE — 83735 ASSAY OF MAGNESIUM: CPT | Performed by: INTERNAL MEDICINE

## 2023-09-15 PROCEDURE — 250N000013 HC RX MED GY IP 250 OP 250 PS 637: Performed by: INTERNAL MEDICINE

## 2023-09-15 PROCEDURE — 99239 HOSP IP/OBS DSCHRG MGMT >30: CPT | Performed by: INTERNAL MEDICINE

## 2023-09-15 PROCEDURE — 36415 COLL VENOUS BLD VENIPUNCTURE: CPT | Performed by: INTERNAL MEDICINE

## 2023-09-15 PROCEDURE — 84132 ASSAY OF SERUM POTASSIUM: CPT | Performed by: INTERNAL MEDICINE

## 2023-09-15 PROCEDURE — 93272 ECG/REVIEW INTERPRET ONLY: CPT | Performed by: INTERNAL MEDICINE

## 2023-09-15 PROCEDURE — 99233 SBSQ HOSP IP/OBS HIGH 50: CPT | Mod: FS | Performed by: INTERNAL MEDICINE

## 2023-09-15 PROCEDURE — 93270 REMOTE 30 DAY ECG REV/REPORT: CPT

## 2023-09-15 PROCEDURE — 80076 HEPATIC FUNCTION PANEL: CPT | Performed by: INTERNAL MEDICINE

## 2023-09-15 RX ORDER — MAGNESIUM OXIDE 400 MG/1
400 TABLET ORAL EVERY 4 HOURS
Status: COMPLETED | OUTPATIENT
Start: 2023-09-15 | End: 2023-09-15

## 2023-09-15 RX ORDER — LISINOPRIL 2.5 MG/1
2.5 TABLET ORAL DAILY
Qty: 30 TABLET | Refills: 0 | Status: SHIPPED | OUTPATIENT
Start: 2023-09-15 | End: 2023-10-24

## 2023-09-15 RX ORDER — METOPROLOL SUCCINATE 25 MG/1
12.5 TABLET, EXTENDED RELEASE ORAL DAILY
Qty: 15 TABLET | Refills: 0 | Status: SHIPPED | OUTPATIENT
Start: 2023-09-15 | End: 2023-10-24

## 2023-09-15 RX ORDER — LISINOPRIL 2.5 MG/1
2.5 TABLET ORAL DAILY
Status: DISCONTINUED | OUTPATIENT
Start: 2023-09-15 | End: 2023-09-15 | Stop reason: HOSPADM

## 2023-09-15 RX ADMIN — LEVOTHYROXINE SODIUM 88 MCG: 88 TABLET ORAL at 06:14

## 2023-09-15 RX ADMIN — METOPROLOL SUCCINATE 12.5 MG: 25 TABLET, EXTENDED RELEASE ORAL at 11:17

## 2023-09-15 RX ADMIN — LISINOPRIL 2.5 MG: 2.5 TABLET ORAL at 11:17

## 2023-09-15 RX ADMIN — Medication 400 MG: at 14:04

## 2023-09-15 RX ADMIN — APIXABAN 5 MG: 5 TABLET, FILM COATED ORAL at 09:57

## 2023-09-15 RX ADMIN — Medication 400 MG: at 09:57

## 2023-09-15 ASSESSMENT — ACTIVITIES OF DAILY LIVING (ADL)
ADLS_ACUITY_SCORE: 18
ADLS_ACUITY_SCORE: 35
ADLS_ACUITY_SCORE: 18

## 2023-09-15 NOTE — PROVIDER NOTIFICATION
MD Notification    Notified Person: MD    Notified Person Name: Dr Damon and Dr Patel    Notification Date/Time: 9/15/23 1400    Notification Interaction: vocera paged and talked to provider    Purpose of Notification: pt was started on Metoprolol and lisinopril.BP recheck is 98/70, pt denies symptoms. Per Dr damon please confirm if cardiology is ok with discharging.     Orders Received: Per Dr Patel, ok with pt going home.    Comments: Dr Damon updated

## 2023-09-15 NOTE — DISCHARGE SUMMARY
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 Transferred to [facility] for ongoing cardiac care.     Hospital Course   Dom Durham was admitted on 9/13/2023.  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 In ED, was found to be in afib with RVR. Trop 11, proBNP 1700. Lytes nl. CT chest showed evidence of interstitial edema and small bilateral pleural effusions. Started on diltiazem gtt. Required dose of digoxin and metoprolol dt ongoing RVR. Placed on heparin gtt for anticoagulation. Given IV Lasix for peripheral edema. Transferred to [facility] for ongoing cardiac care.   * Initially remained on diltiazem and heparin gtts. Seen by cardiology.   * Echo (TTE and JUANY) obtained on 9/14 while in rapid afib and showed EF 30-35% with mildly dilated LV and mod to severely reduced LVSF; mild MR and mild TR; no thrombus in ANGELICA.   * Bilateral LE US neg for DVT.   * Underwent cardioversion on 9/14. Remained in NSR thereafter. Anticoagulation changed from heparin gtt to Eliquis (5mg BID). Respiratory status remained stable on RA, so no additional diuresis was required. Lisinopril and metoprolol XL added given findings of reduced EF.  * Discharged on Eliquis, lisinopril 2.5mg daily, metoprolol XL 12.5mg daily.    * Will discharge on 30d cardiac monitor. Follow up with [facility] in clinic. Repeat echo in 4 wks, if no improvement in EF will need ischemic workup.     Hypertension  * Managing with amlodipine prior to admission.   * Started on low dose lisinopril and metoprolol this stay given findings of reduced EF, these meds were prescribed at discharge. Amlodipine held at discharge  * Follow up with cardiology and PCP as above to adjust meds further as needed.     Hypothyroidism  * Chronic and stable on levothyroxine    Alcohol Use  Abnl LFTs  * Labs notable for AST/ALT 60/54, total bili 1.5, remainder of LFTs stable. Reported increased EtOH use in the past month after patient's wife . No s/sx of withdrawal this stay. Counseled on cessation. Labs remained stable during stay.    Paola Damon,     Significant Results and Procedures   DCCV on 2023 per Dr. Akbar  Initial shock with 150 J, synchronized, pads AP, was unsuccessful.  Second shock with 200 J, synchronized, pads AP, was successful.   Patient tolerated the procedure well with no immediate complications     Code Status   Full Code       Primary Care Physician   Ibrahima Casper    Physical Exam   Temp: 97.7  F (36.5  C) Temp src: Oral BP: 107/81 Pulse: 63   Resp: 19 SpO2: 97 % O2 Device: None (Room air) Oxygen Delivery: 3 LPM  Vitals:    23 0548 09/15/23 0500   Weight: 103.2 kg (227 lb 8 oz) 98.7 kg (217 lb 8 oz)     Vital Signs with Ranges  Temp:  [97.5  F (36.4  C)-97.9  F (36.6  C)] 97.7  F (36.5  C)  Pulse:  [] 63  Resp:  [11-19] 19  BP: ()/() 107/81  SpO2:  [91 %-98 %] 97 %  I/O last 3 completed shifts:  In:  [P.O.:1680; I.V.:306]  Out: 4450 [Urine:4450]    General: Resting comfortably, alert, conversive, NAD  CVS: HRRR, no MGR, no LE edema  Respiratory: CTAB, no wheeze/rales/rhonchi, no increased work of breathing   GI: S, NT, ND, +BS  Skin: Warm/dry    Discharge Disposition   Discharged to  "home  Condition at discharge: Stable    Consultations This Hospital Stay   CARDIOLOGY IP CONSULT  PHARMACY IP CONSULT  PHARMACY LIAISON FOR MEDICATION COVERAGE CONSULT    Time Spent on this Encounter   I, Paola Damon DO, personally saw the patient today and spent greater than 30 minutes discharging this patient.    Discharge Orders      Reason for your hospital stay    Management of your afib, for which you underwent a cardioversion to return your heart to sinus rhythm.     Follow-up and recommended labs and tests     Follow up with [facility] in clinic as advised  Follow up with your PCP as scheduled.     Activity    Your activity upon discharge: activity as tolerated     Diet    Follow this diet upon discharge: Regular     Discharge Medications   Current Discharge Medication List        START taking these medications    Details   apixaban ANTICOAGULANT (ELIQUIS) 5 MG tablet Take 1 tablet (5 mg) by mouth 2 times daily for 30 days  Qty: 60 tablet, Refills: 0    Associated Diagnoses: New onset a-fib (H)      lisinopril (ZESTRIL) 2.5 MG tablet Take 1 tablet (2.5 mg) by mouth daily for 30 days  Qty: 30 tablet, Refills: 0    Associated Diagnoses: Cardiomyopathy, unspecified type (H)      metoprolol succinate ER (TOPROL XL) 25 MG 24 hr tablet Take 0.5 tablets (12.5 mg) by mouth daily for 30 days  Qty: 15 tablet, Refills: 0    Associated Diagnoses: New onset a-fib (H); Cardiomyopathy, unspecified type (H)           CONTINUE these medications which have NOT CHANGED    Details   levothyroxine (SYNTHROID/LEVOTHROID) 88 MCG tablet Take 1 tablet (88 mcg) by mouth daily  Qty: 90 tablet, Refills: 4    Associated Diagnoses: Hypothyroidism, unspecified type           STOP taking these medications       amLODIPine (NORVASC) 10 MG tablet Comments:   Reason for Stopping:             Allergies   Allergies   Allergen Reactions    Amoxicillin-Pot Clavulanate Other (See Comments)     \"I was so weak and just felt like " "dying\"    Dilaudid [Hydromorphone] Nausea and Vomiting     \"ok with Percocet\"    Hydrocodone Nausea     \"also did not do anything for my pain\"     Data   Most Recent 3 CBC's:  Recent Labs   Lab Test 09/14/23  0556 09/13/23  0844 10/08/21  0941   WBC 8.6 9.8 9.0   HGB 16.5 15.9 16.0   MCV 98 97 95    172 191      Most Recent 3 BMP's:  Recent Labs   Lab Test 09/15/23  0612 09/14/23  0556 09/13/23  0844 01/23/23  0804   NA  --  138 140 141   POTASSIUM 4.2 4.1 4.1 4.3   CHLORIDE  --  106 103 105   CO2  --  20* 23 26   BUN  --  9.2 9.0 10.0   CR  --  1.03 1.04 0.87   ANIONGAP  --  12 14 10   SHELIA  --  9.4 9.7 9.6   GLC  --  102* 104* 95     Most Recent 2 LFT's:  Recent Labs   Lab Test 09/15/23  0612 09/14/23  0556   AST 60* 60*   ALT 64 54   ALKPHOS 73 68   BILITOTAL 1.1 1.5*     Most Recent TSH, T4 and A1c Labs:  Recent Labs   Lab Test 09/14/23  0556 11/11/16  1022 09/28/15  1453   TSH 3.41   < > 4.46*   T4  --   --  0.88    < > = values in this interval not displayed.     Results for orders placed or performed during the hospital encounter of 09/13/23   US Lower Extremity Venous Duplex Bilateral    Narrative    VENOUS ULTRASOUND BILATERAL LOWER EXTREMITIES  9/14/2023 8:07 AM     HISTORY: Lower extremity swelling. Rule out deep venous thrombosis.     COMPARISON: None.    TECHNIQUE: Color Doppler and spectral waveform analysis performed  throughout the deep veins of both lower extremities.    FINDINGS: Both common femoral, proximal great saphenous, femoral, and  popliteal veins demonstrate normal blood flow, compression, and  augmentation. Posterior tibial and peroneal veins are compressible.      Impression    IMPRESSION: Negative for deep venous thrombosis in both lower  extremities.    OSCAR CEDENO MD         SYSTEM ID:  [id number]   EP Cardioversion External    Narrative    Toñito Akbar MD     9/14/2023  4:27 PM  [facility]    Procedure: EP Cardioversion External    Date/Time: " 9/14/2023 3:00 PM    Performed by: Toñito Akbar MD  Authorized by: Edin Cano MD      UNIVERSAL PROTOCOL   Site Marked: NA  Prior Images Obtained and Reviewed:  Yes  Required items: Required blood products, implants, devices and special   equipment available    Patient identity confirmed:  Verbally with patient, arm band, provided   demographic data and hospital-assigned identification number  Patient was reevaluated immediately before administering moderate or deep   sedation or anesthesia  Confirmation Checklist:  Patient's identity using two indicators, relevant   allergies, procedure was appropriate and matched the consent or emergent   situation and correct equipment/implants were available  Time out: Immediately prior to the procedure a time out was called    Universal Protocol: the Joint Commission Universal Protocol was followed    Preparation: Patient was prepped and draped in usual sterile fashion       ANESTHESIA    Anesthesia was administered and monitored by anesthesiology.  See   anesthesia documentation for details.    SEDATION  Patient Sedated: Yes    Sedation Type:  Deep  Sedation:  Propofol  Vital signs: Vital signs monitored during sedation      PROCEDURE DETAILS  Cardioversion basis: elective  Indications: failure of anti-arrhythmic medications  Pre-procedure rhythm: atrial fibrillation  Patient position: patient was placed in a supine position  Chest area: chest area exposed  Electrodes: pads  Electrodes placed: anterior-posterior  Number of attempts: 2    Details of Attempts:  Initial shock with 150 J, synchronized, pads AP, was   unsuccessful.    Second shock with 200 J, synchronized, pads AP, was successful.   Post-procedure rhythm: normal sinus rhythm  Complications: no complications      PROCEDURE    Patient Tolerance:  Patient tolerated the procedure well with no immediate   complications   Echocardiogram Complete     Value    LVEF  30-35%    Narrative     [id number]  Critical access hospital  [id number]  [id number]^SALOMÓN^JUDITH^DAVID     [facility]  Echocardiography Laboratory  [address] [place] [address]     Name: QUENTIN RAPP  MRN: [id number]  : 1965  Study Date: 2023 12:53 PM  Age: 58 yrs  Gender: Male  Patient Location: [facility]  Reason For Study: Atrial Fibrillation  Ordering Physician: JUDITH LORENZANA  Referring Physician: JUDITH LORENZANA  Performed By: TRUDY Cody     BSA: 2.2 m2  Height: 72 in  Weight: 227 lb  HR: 103  BP: 113/69 mmHg  ______________________________________________________________________________  Procedure  Complete Portable Echo Adult. Optison (NDC #[id number]) given intravenously.  ______________________________________________________________________________  Interpretation Summary     The left ventricle is mildly dilated.  There is mod-severe global hypokinesia of the left ventricle.  The visual ejection fraction is 30-35%.  There is trace to mild mitral regurgitation.  There is mild (1+) tricuspid regurgitation.  The rhythm was rapid atrial fibrillation.  There is no comparison study available.  ______________________________________________________________________________  Left Ventricle  The left ventricle is mildly dilated. There is normal left ventricular wall  thickness. The visual ejection fraction is 30-35%. Diastolic Doppler findings  (E/E' ratio and/or other parameters) suggest left ventricular filling  pressures are indeterminate. There is mod-severe global hypokinesia of the  left ventricle.     Right Ventricle  The right ventricle is normal in structure, function and size.     Atria  There is moderate biatrial enlargement. There is no atrial shunt seen.     Mitral Valve  The mitral valve is normal in structure and function. There is trace to mild  mitral regurgitation.     Tricuspid Valve  Right ventricle systolic pressure estimate normal. There is mild (1+)  tricuspid regurgitation.  The right ventricular systolic pressure is  approximated at 18.2 mmHg plus the right atrial pressure. IVC diameter <2.1 cm  collapsing >50% with sniff suggests a normal RA pressure of 3 mmHg.     Aortic Valve  The aortic valve is trileaflet with aortic valve sclerosis. There is trace  aortic regurgitation. No aortic stenosis is present.     Pulmonic Valve  The pulmonic valve is not well seen, but is grossly normal. There is trace  pulmonic valvular regurgitation.     Vessels  Normal size aorta. The inferior vena cava was normal in size with preserved  respiratory variability.     Pericardium  There is no pericardial effusion.     Rhythm  The rhythm was rapid atrial fibrillation.  ______________________________________________________________________________  MMode/2D Measurements & Calculations  IVSd: 0.97 cm     LVIDd: 5.9 cm  LVIDs: 5.1 cm  LVPWd: 0.98 cm  FS: 13.2 %  LV mass(C)d: 231.3 grams  LV mass(C)dI: 102.9 grams/m2  Ao root diam: 3.6 cm  asc Aorta Diam: 3.7 cm  LVOT diam: 2.1 cm  LVOT area: 3.5 cm2  Ao root diam index BSA (cm/m2): 1.6  Asc Ao diam index BSA (cm/m2): 1.7  LA Volume (BP): 64.1 ml  LA Volume Indexed (AL/bp): 27.8 ml/m2     RV Base: 2.7 cm  RWT: 0.33  TAPSE: 1.9 cm     Doppler Measurements & Calculations  MV E max kenan: 28.5 cm/sec  MV dec time: 0.17 sec  PA acc time: 0.17 sec  TR max kenan: 211.3 cm/sec  TR max P.2 mmHg  Lateral E/e': 2.8  RV S Kenan: 10.6 cm/sec     ______________________________________________________________________________  Report approved by: Sindy Garcia 2023 04:30 PM         Transesophageal Echocardiogram     Value    LVEF  30-35%    Narrative    [id number]  [id number]  [id number]^HO^NOÉ^BARBARA     [facility]  Echocardiography Laboratory  [address] [place], [place] [id number]     Name: QUENTIN RAPP  MRN: [id number]  : 1965  Study Date: 2023 01:47 PM  Age: 58 yrs  Gender: Male  Patient Location: [facility]  Reason For  Study: Afib  Ordering Physician: NOÉ CHAUDHARY  Referring Physician: NOÉ CHAUDHARY  Performed By: RUST Ronel Cody     BSA: 2.2 m2  Height: 72 in  Weight: 227 lb  HR: 122  BP: 110/101 mmHg  ______________________________________________________________________________  Procedure  Complete JUANY Adult.  ______________________________________________________________________________  Interpretation Summary     The left ventricle is mildly dilated.  Left ventricular systolic function is moderate to severely reduced.  The visual ejection fraction is 30-35%.  There is mild (1+) mitral regurgitation.  There is mild (1+) tricuspid regurgitation.  No thrombus is detected in the left atrial appendage.  The rhythm was rapid atrial fibrillation.  ______________________________________________________________________________  JUANY  I determined this patient to be an appropriate candidate for the planned  sedation and procedure and have reassessed the patient immediately prior to  sedation and procedure. Total sedation time: 23 minutes of continuous bedside  1:1 monitoring. Versed (4mg) was given intravenously. Fentanyl (100mcg) was  given intravenously. Prior to the exam, the oral cavity was checked and no  overcrowding was noted. Consent to the procedure was obtained prior to  sedation. The transesophageal probe was passed without difficulty. There were  no complications associated with this procedure.     Left Ventricle  The left ventricle is mildly dilated. Left ventricular systolic function is  moderate to severely reduced. The visual ejection fraction is 30-35%. There is  mod-severe global hypokinesia of the left ventricle.     Right Ventricle  Normal right ventricle structure and size. Mildly decreased right ventricular  systolic function.     Atria  There is moderate biatrial enlargement. Intact atrial septum. There is no  color Doppler evidence of an atrial shunt. A contrast injection (Bubble Study)  was performed that was  negative for flow across the interatrial septum. No  thrombus is detected in the left atrial appendage.     Mitral Valve  There is mild (1+) mitral regurgitation.     Tricuspid Valve  There is mild (1+) tricuspid regurgitation.     Aortic Valve  There is mild trileaflet aortic sclerosis. There is trace aortic  regurgitation. No aortic stenosis is present.     Pulmonic Valve  The pulmonic valve is not well seen, but is grossly normal. There is trace  pulmonic valvular regurgitation.     Vessels  The aortic root is normal size. Normal ascending, transverse (arch), and  descending aorta. Normal pulmonary venous drainage. Normal pulmonary vein  velocity.     Pericardial/Pleural  There is no pericardial effusion.     Rhythm  The rhythm was rapid atrial fibrillation.  ______________________________________________________________________________  MMode/2D Measurements & Calculations  Ao root diam: 3.8 cm  asc Aorta Diam: 3.9 cm  Ao root diam index BSA (cm/m2): 1.7  Asc Ao diam index BSA (cm/m2): 1.7     ______________________________________________________________________________  Report approved by: Sindy Garcia 09/14/2023 04:41 PM

## 2023-09-15 NOTE — PLAN OF CARE
A&Ox4. VSS on RA ex HR 50s. Tele SB, has been in sinus since successful cardioversion yesterday. Restarted eliquis in evening. LS clear, no BLE edema. Echo done, EF 30-35%. Possible discharge home today.

## 2023-09-15 NOTE — PROGRESS NOTES
[facility]  Cardiology Progress Note  Date of Service: 09/15/2023  Primary Cardiologist: Will be Dr. Cano    Assessment & Plan    Dom Durham is a 58 year old male admitted on 9/13/2023 .    Assessment:  1.  Atrial fibrillation with rapid ventricular response, chronicity unknown.  Now status post JUANY guided cardioversion, successful.  2.  Cardiomyopathy, new.  Suspect tachycardia mediated.  However, if LVEF does not improve with sinus rhythm maintenance, will need ischemic work-up.  3.  Hypertension   4.  EtOH dependence, cessation encouraged  5.  Obesity    Plan:   1.  Medications   -Continue Eliquis 5 mg twice daily for cardioembolic risk reduction   -Start Toprol-XL 12.5 mg once daily   -Start lisinopril 2.5 mg once daily  2.  Repeat echocardiogram in 4 weeks.  If cardiomyopathy is indeed tachycardia mediated, suspect LVEF to have improved with sinus rhythm maintenance.  If LVEF not improving, consider ischemic work-up  3.  Discharge on 30-day cardiac event monitor.  Patient is asymptomatic to his arrhythmia.  It will be important to know if he develops recurrent atrial fibrillation RVR  4.  Cardiology ANA ROSA follow-up in [place] in 4 weeks  5.  Alcohol cessation strongly encouraged  6.  Okay to discharge from a cardiology perspective later this afternoon if tolerating low-dose Toprol-XL and lisinopril.  Cardiology will sign off    A total of 25 minutes was spent face-to-face or coordinating care of Dom Durham. Over 50% of our time was spent counseling the patient and/or coordinating care.    Brittani Mak PA-C  [facility]  Pager: [phone]    Interval History   Echocardiogram completed yesterday revealed moderate to severe global hypokinesia of the LV with LVEF 30 to 35%, mild MR, mild TR.  The study was conducted while patient was in rapid atrial fibrillation.    Status post JUANY guided cardioversion, successful.  Has maintained sinus  bradycardia/sinus rhythm since.    No acute events overnight.  Resting comfortably in bed.  Denies chest pain, shortness of breath, palpitations, lightheadedness, dizziness, near-syncope or syncope.      Physical Exam   Temp: 97.7  F (36.5  C) Temp src: Oral BP: 107/81 Pulse: 63   Resp: 19 SpO2: 97 % O2 Device: None (Room air) Oxygen Delivery: 3 LPM  Vitals:    09/14/23 0548 09/15/23 0500   Weight: 103.2 kg (227 lb 8 oz) 98.7 kg (217 lb 8 oz)       GEN: well nourished, in no acute distress.  HEENT:  Pupils equal, round. Sclerae nonicteric.   NECK: Supple, no masses appreciated.  No JVD  C/V:  Regular rate and rhythm, no murmur appreciated  RESP: Respirations are unlabored. Clear to auscultation bilaterally without wheezing, rales, or rhonchi.  GI: Abdomen soft, nontender.  EXTREM: No LE edema.  NEURO: Alert and oriented, cooperative.  SKIN: Warm and dry.     Medications    - MEDICATION INSTRUCTIONS -      - MEDICATION INSTRUCTIONS -      Reason anticoagulant not prescribed for atrial fibrillation      - MEDICATION INSTRUCTIONS -        apixaban ANTICOAGULANT  5 mg Oral BID    levothyroxine  88 mcg Oral Daily    magnesium oxide  400 mg Oral Q4H    sodium chloride (PF)  3 mL Intracatheter Q8H       Data   Last 24 hours labs reviewed     Tele: SB/SR    Echo 9/14/2023  The left ventricle is mildly dilated.  There is mod-severe global hypokinesia of the left ventricle.  The visual ejection fraction is 30-35%.  There is trace to mild mitral regurgitation.  There is mild (1+) tricuspid regurgitation.  The rhythm was rapid atrial fibrillation.  There is no comparison study available.

## 2023-09-15 NOTE — PLAN OF CARE
Goal Outcome Evaluation:    Plan Of Care Reviewed With: Patient and Family     Pt calm and cooperative. Vitals stable and cardiac rhythm remains SB after successful CV this afternoon. Vitals stable. Up with SBA. Echo completed EF 30-35%. LE US negative DVT. Will resume Eliquis this evening and discontinue Heparin gtt.

## 2023-09-15 NOTE — PLAN OF CARE
Goal Outcome Evaluation:      Plan of Care Reviewed With: patient        A&Ox4. VSS on RA ex HR 50s. Tele SB/SR.Educated on Eliquis, Lisinopril, Metoprolol ER, Afib and follow up appts. LS clear, no BLE edema. Echo done, EF 30-35%. Discharging home today. Denies pain, light headedness or dizziness. Rx given. Event monitor is on. Sister is taking home at 1415

## 2023-09-18 ENCOUNTER — TELEPHONE (OUTPATIENT)
Dept: CARDIOLOGY | Facility: CLINIC | Age: 58
End: 2023-09-18
Payer: COMMERCIAL

## 2023-09-18 NOTE — TELEPHONE ENCOUNTER
Patient was admitted to [facility] on 9/13/23 with evaluation of shortness of breath and LE edema, new onset atrial fibrillation with rapid ventricular response, chronicity unknown and new onset cardiomyopathy-suspect tachy mediated.    PMH: HTN, ETOH dependence, obesity.    9/14/23: Echo showed EF of 30%. There is mod-severe global hypokinesia of the left ventricle. There is trace to mild mitral regurgitation. There is mild (1+) tricuspid regurgitation. The rhythm was rapid atrial fibrillation.    9/14/23: JUANY with successful DCCV to NSR after shock x 2.    Pt was started on Eliquis, Lisinopril and Metoprolol. PTA Norvasc was discontinued at time of discharge. Pt was discharged wearing a 30 cardiac event monitor.    Pt is scheduled for an echo on 10/18/23 at 1000, and an OV on 10/24/23 at 0830 with ANA ROSA Amy Ekman at our [place] Office.    Writer attempted to call pt for a cardiology post discharge phone call, but no answer after multiple rings, and then call disconnects. Unable to leave a VM. Will try back at a later date. DANIELA Ramsey RN.

## 2023-09-19 NOTE — TELEPHONE ENCOUNTER
Second attempt at calling pt and today he answered.    Called patient to discuss any post hospital d/c questions, review discharge medication, and confirm f/u appts. Patient denied any questions regarding new or changes to PTA medications.     Patient denied any SOB, chest pain, edema, or light headedness. Occasional short episodes of palpitations noted, but no sustained.     RN confirmed with patient that he is scheduled for an echo on 10/18/23 at 1000, and an OV on 10/24/23 at 0830 with ANA ROSA Amy Jaimes at our [place] Office.     Instructed patient to weigh self every AM, after waking and using the restroom, but before breakfast and medications. Call clinic for a weight gain of 2 lbs overnight or 5 lbs in a week. Low Na+ diet encouraged. Pt instructed to bring daily wt/BP diary and medications with to f/u OV.  Dr. Cano's Team RN phone number provided.    Patient advised to call clinic with any cardiac related questions or concerns prior to his appt. Patient verbalized understanding and agreed with plan. DANIELA Ramsey RN.

## 2023-09-20 ENCOUNTER — HOSPITAL ENCOUNTER (EMERGENCY)
Facility: CLINIC | Age: 58
Discharge: HOME | End: 2023-09-20
Attending: FAMILY MEDICINE | Admitting: FAMILY MEDICINE
Payer: COMMERCIAL

## 2023-09-20 ENCOUNTER — NURSE TRIAGE (OUTPATIENT)
Dept: NURSING | Facility: CLINIC | Age: 58
End: 2023-09-20
Payer: COMMERCIAL

## 2023-09-20 ENCOUNTER — TELEPHONE (OUTPATIENT)
Dept: FAMILY MEDICINE | Facility: OTHER | Age: 58
End: 2023-09-20
Payer: COMMERCIAL

## 2023-09-20 VITALS
SYSTOLIC BLOOD PRESSURE: 111 MMHG | RESPIRATION RATE: 18 BRPM | HEART RATE: 70 BPM | DIASTOLIC BLOOD PRESSURE: 81 MMHG | TEMPERATURE: 97.7 F | OXYGEN SATURATION: 97 %

## 2023-09-20 DIAGNOSIS — H10.33 ACUTE BACTERIAL CONJUNCTIVITIS OF BOTH EYES: ICD-10-CM

## 2023-09-20 LAB
ATRIAL RATE - MUSE: 159 BPM
ATRIAL RATE - MUSE: 59 BPM
DIASTOLIC BLOOD PRESSURE - MUSE: NORMAL MMHG
DIASTOLIC BLOOD PRESSURE - MUSE: NORMAL MMHG
INTERPRETATION ECG - MUSE: NORMAL
INTERPRETATION ECG - MUSE: NORMAL
P AXIS - MUSE: 47 DEGREES
P AXIS - MUSE: NORMAL DEGREES
PR INTERVAL - MUSE: 148 MS
PR INTERVAL - MUSE: NORMAL MS
QRS DURATION - MUSE: 92 MS
QRS DURATION - MUSE: 98 MS
QT - MUSE: 264 MS
QT - MUSE: 442 MS
QTC - MUSE: 370 MS
QTC - MUSE: 437 MS
R AXIS - MUSE: 74 DEGREES
R AXIS - MUSE: 93 DEGREES
SYSTOLIC BLOOD PRESSURE - MUSE: NORMAL MMHG
SYSTOLIC BLOOD PRESSURE - MUSE: NORMAL MMHG
T AXIS - MUSE: 116 DEGREES
T AXIS - MUSE: 208 DEGREES
VENTRICULAR RATE- MUSE: 118 BPM
VENTRICULAR RATE- MUSE: 59 BPM

## 2023-09-20 PROCEDURE — 99283 EMERGENCY DEPT VISIT LOW MDM: CPT | Performed by: FAMILY MEDICINE

## 2023-09-20 PROCEDURE — 250N000013 HC RX MED GY IP 250 OP 250 PS 637: Performed by: FAMILY MEDICINE

## 2023-09-20 PROCEDURE — 250N000009 HC RX 250: Performed by: FAMILY MEDICINE

## 2023-09-20 RX ORDER — TETRACAINE HYDROCHLORIDE 5 MG/ML
1-2 SOLUTION OPHTHALMIC ONCE
Status: COMPLETED | OUTPATIENT
Start: 2023-09-20 | End: 2023-09-20

## 2023-09-20 RX ORDER — IBUPROFEN 800 MG/1
800 TABLET, FILM COATED ORAL ONCE
Status: COMPLETED | OUTPATIENT
Start: 2023-09-20 | End: 2023-09-20

## 2023-09-20 RX ORDER — TOBRAMYCIN 3 MG/ML
1-2 SOLUTION/ DROPS OPHTHALMIC
Qty: 5 ML | Refills: 0 | Status: SHIPPED | OUTPATIENT
Start: 2023-09-20 | End: 2024-03-08

## 2023-09-20 RX ADMIN — IBUPROFEN 800 MG: 800 TABLET, FILM COATED ORAL at 01:23

## 2023-09-20 RX ADMIN — TETRACAINE HYDROCHLORIDE 2 DROP: 5 SOLUTION OPHTHALMIC at 01:24

## 2023-09-20 ASSESSMENT — ACTIVITIES OF DAILY LIVING (ADL): ADLS_ACUITY_SCORE: 35

## 2023-09-20 NOTE — TELEPHONE ENCOUNTER
Patient calling reports recently starting on medication for Afib with pain to the eyes starting 9/18. Reports pain is severe rated 9/10 where it does not feel there is something in the eye but there is significant burning and pain. Patient reports the eye is red as well but the pain is the most significant. Advised per protocol to be seen in the ER with patient agreeable to the plan.     Julia Olea RN 09/20/23 12:39 AM   [facility] Triage Nurse Advisor    Reason for Disposition   SEVERE eye pain    Additional Information   Negative: Chemical got in the eye   Negative: Piece of something got in the eye   Negative: Eye redness followed an eye injury   Negative: Yellow or green pus in the eyes   Negative: [1] Eyelid is swollen AND [2] no redness of white of eye (sclera)   Negative: Caused by pollen or other allergy OR main symptom is itchy eyes   Negative: Red, widespread rash also present    Protocols used: Eye - Red Without Pus-A-AH

## 2023-09-20 NOTE — ED PROVIDER NOTES
"  History     Chief Complaint   Patient presents with    Eye Problem     HPI  Dom Durham is a 58 year old male who presents with a 2 to 3-day history of increasing eye pain and inflammation and irritation.  Patient is wonder if it could be related to a new blood pressure medicine that he was started on.  Patient was started on lisinopril 3 days ago.  Patient states that his eyes feel really irritated and swollen.  It hurts more when his eyes are closed.  He has been tearing a lot but no necessarily discharge or matting.  Denies any fevers or chills.  Denies any loss of vision or change in vision.    Allergies:  Allergies   Allergen Reactions    Amoxicillin-Pot Clavulanate Other (See Comments)     \"I was so weak and just felt like dying\"    Dilaudid [Hydromorphone] Nausea and Vomiting     \"ok with Percocet\"    Hydrocodone Nausea     \"also did not do anything for my pain\"       Problem List:    Patient Active Problem List    Diagnosis Date Noted    New onset a-fib (H) 09/14/2023     Priority: Medium    Essential hypertension 10/10/2021     Priority: Medium    Bilateral hearing loss 01/24/2020     Priority: Medium     Formatting of this note might be different from the original.  Loud noise.      Dermatitis of external ear 01/24/2020     Priority: Medium    Hx of colonic polyp 01/24/2020     Priority: Medium     Formatting of this note might be different from the original.  Tubular adenoma      Hypothyroidism, unspecified type 05/15/2013     Priority: Medium    Fatty liver 05/15/2013     Priority: Medium    Hyperlipemia 01/08/2009     Priority: Medium     Formatting of this note might be different from the original.  The 10-year CVD risk score (JUDAH'Kendal, et al., 2008) is: 15.4%-> consider CT coronary calcium screen    Values used to calculate the score:      Age: 54 years      Sex: Male      Diabetic: No      Tobacco smoker: No      Systolic Blood Pressure: 148 mmHg      Is BP treated: No      HDL " Cholesterol: 57 mg/dL      Total Cholesterol: 232 mg/dL      Vitamin D deficiency 01/08/2009     Priority: Medium        Past Medical History:    Past Medical History:   Diagnosis Date    History of ankle fracture     Hypertension     Thyroid dysfunction        Past Surgical History:    Past Surgical History:   Procedure Laterality Date    ANESTHESIA CARDIOVERSION N/A 9/14/2023    Procedure: Anesthesia cardioversion;  Surgeon: GENERIC ANESTHESIA PROVIDER;  Location: [facility] OR    ANKLE SURGERY  1984    COLONOSCOPY N/A 6/10/2016    Procedure: COLONOSCOPY;  Surgeon: Jarek Chaudhari MD;  Location: [facility]    COLONOSCOPY N/A 2/9/2023    Procedure: COLONOSCOPY;  Surgeon: Vitaliy Huff DO;  Location: [facility]    ESOPHAGOSCOPY, GASTROSCOPY, DUODENOSCOPY (EGD), COMBINED  5/24/2013    Procedure: COMBINED ESOPHAGOSCOPY, GASTROSCOPY, DUODENOSCOPY (EGD), BIOPSY SINGLE OR MULTIPLE;;  Surgeon: Timothy Jennings MD;  Location: [facility]    LAPAROSCOPY DIAGNOSTIC (GENERAL)  8/14/2013    Procedure: LAPAROSCOPY DIAGNOSTIC (GENERAL);  Diagnostic Laparoscopy;  Surgeon: Stu Arias MD;  Location: [facility] OR    SHOULDER SURGERY         Family History:    Family History   Problem Relation Age of Onset    Hypertension Father     Prostate Cancer Father     Cancer Father         primary not known    Thyroid Disease Sister     Liver Disease Brother     Liver Disease Brother     Colon Cancer No family hx of        Social History:  Marital Status:   [2]  Social History     Tobacco Use    Smoking status: Never    Smokeless tobacco: Never   Vaping Use    Vaping Use: Never used   Substance Use Topics    Alcohol use: Yes     Alcohol/week: 6.0 standard drinks of alcohol     Types: 6 Shots of liquor per week    Drug use: No        Medications:    tobramycin (TOBREX) 0.3 % ophthalmic solution  apixaban ANTICOAGULANT (ELIQUIS) 5 MG tablet  levothyroxine (SYNTHROID/LEVOTHROID) 88 MCG tablet  lisinopril (ZESTRIL) 2.5 MG tablet  metoprolol  succinate ER (TOPROL XL) 25 MG 24 hr tablet          Review of Systems   All other systems reviewed and are negative.      Physical Exam   BP: (!) 140/118  Pulse: 76  Temp: 98  F (36.7  C)  Resp: 18  SpO2: 96 %      Physical Exam  Vitals and nursing note reviewed.   Constitutional:       General: He is not in acute distress.     Appearance: Normal appearance. He is not ill-appearing.   Eyes:      General:         Right eye: No discharge.         Left eye: No discharge.      Conjunctiva/sclera:      Right eye: Right conjunctiva is injected. No chemosis, exudate or hemorrhage.     Left eye: Left conjunctiva is injected. Hemorrhage present. No chemosis or exudate.     Comments: Patient's left conjunctive is significantly inflamed and injected, left eye is worse than the right.  There is no fluorescein uptake on the pupils, patient has normal pupillary reflexes.   Neurological:      Mental Status: He is alert.         ED Course                 Procedures           No results found for this or any previous visit (from the past 24 hour(s)).    Medications   tetracaine (PONTOCAINE) 0.5 % ophthalmic solution 1-2 drop (has no administration in time range)   ibuprofen (ADVIL/MOTRIN) tablet 800 mg (has no administration in time range)     Patient appears to have a very severe conjunctivitis.  This does not appear to be a reaction to his medication, there is no signs of angioedema or other allergic symptoms.  His conjunctive are very inflamed especially on his left eye where he is having more discomfort.  I am not sure if this is some type of bacterial or viral conjunctivitis.  I Lizet try him on a course of some antibiotic drops.  He will use cool compresses to help with discomfort.  Patient was told to follow-up with ophthalmology if there is no improvement over the next 24 hours.    Assessments & Plan (with Medical Decision Making)  Acute conjunctivitis     I have reviewed the nursing notes.    I have reviewed the findings,  diagnosis, plan and need for follow up with the patient.      New Prescriptions    TOBRAMYCIN (TOBREX) 0.3 % OPHTHALMIC SOLUTION    Place 1-2 drops into both eyes every 2 hours       Final diagnoses:   Acute bacterial conjunctivitis of both eyes       9/20/2023   [facility] EMERGENCY DEPT       Yoni Luis MD  09/20/23 0126

## 2023-09-20 NOTE — DISCHARGE INSTRUCTIONS
Follow up ophthalmology options:   [facility]  Address: [address], [place] [address]  Phone: [phone]    [facility]  Address: [address], [place] [address]  Phone: [phone]    [facility]  Address:  [place]  Phone:  [phone]    [facility]  Erik Olson MD  Address:  [place], [place]  Phone:  [phone]

## 2023-09-20 NOTE — TELEPHONE ENCOUNTER
Reason for Call:  Appointment Request    Patient requesting this type of appt:  Hospital/ED Follow-Up     Requested provider:  Aftab Guevara    Reason patient unable to be scheduled: Not within requested timeframe    When does patient want to be seen/preferred time: 3-7 days    Comments: Hospital F/U A Fib    [facility] or would you prefer to receive a phone call?:   Patient would prefer a phone call   Okay to leave a detailed message?: Yes at Home number on file [phone] (home)    Call taken on 9/20/2023 at 9:46 AM by Nadia Palacios

## 2023-09-25 ENCOUNTER — OFFICE VISIT (OUTPATIENT)
Dept: FAMILY MEDICINE | Facility: OTHER | Age: 58
End: 2023-09-25
Payer: COMMERCIAL

## 2023-09-25 VITALS
TEMPERATURE: 97.2 F | WEIGHT: 209.3 LBS | BODY MASS INDEX: 27.74 KG/M2 | HEIGHT: 73 IN | RESPIRATION RATE: 16 BRPM | HEART RATE: 75 BPM | DIASTOLIC BLOOD PRESSURE: 68 MMHG | OXYGEN SATURATION: 97 % | SYSTOLIC BLOOD PRESSURE: 104 MMHG

## 2023-09-25 DIAGNOSIS — Z63.4 BEREAVEMENT: ICD-10-CM

## 2023-09-25 DIAGNOSIS — I48.91 ATRIAL FIBRILLATION WITH RAPID VENTRICULAR RESPONSE (H): Primary | ICD-10-CM

## 2023-09-25 DIAGNOSIS — E03.9 HYPOTHYROIDISM, UNSPECIFIED TYPE: ICD-10-CM

## 2023-09-25 DIAGNOSIS — E78.5 HYPERLIPIDEMIA, UNSPECIFIED HYPERLIPIDEMIA TYPE: ICD-10-CM

## 2023-09-25 DIAGNOSIS — I10 ESSENTIAL HYPERTENSION: ICD-10-CM

## 2023-09-25 PROCEDURE — 99214 OFFICE O/P EST MOD 30 MIN: CPT | Performed by: PHYSICIAN ASSISTANT

## 2023-09-25 SDOH — SOCIAL STABILITY - SOCIAL INSECURITY: DISSAPEARANCE AND DEATH OF FAMILY MEMBER: Z63.4

## 2023-09-25 NOTE — PROGRESS NOTES
Assessment & Plan     Atrial fibrillation with rapid ventricular response (H)  Patient was admitted to the [facility] from 09/13-09/15 following presenting to [facility] ED with symptoms of orthopnea, PND and chest pain. His wife passed in July 2023, patient has been grieving. Also noted last alcohol consumption was 3-4 days prior. Workup found patient to be in atrial fibrillation with RVR. Rate control was unable to be achieved despite use of maximum dose of diltiazem, digosin and metoprolol. He was transferred to [facility] and underwent JUANY and TTE which identified reduced EF 30-35% with severely reduced LVSF. He was able to be cardioverted and discharged on oral metoprolol as well as lisinopril. He was given instruction on low salt diet and is completing a 30 day holter monitor.     He has been active since discharge with cutting and splitting wood for the winter. Denies chest pains, SOB or dizziness while performing these actions. He has been tracking BP since discharge as well. He is averaging 90s to low 100s systolic. The patient has follow up with cardiologist on 10/18 & 10/24. Reviewed pathophysiology of afib w/ RVR as well as what the medications he is on are doing for him. Patient will continue low salt diet, remain active as he tolerates. He is currently abstaining from all caffeine and alcohol use. No substance or tobacco use. He will reach out if symptoms recur or if he has other concerns.     Hypothyroidism, unspecified type  Patient has been taking his levothyroxine without missing doses. Reviewed labs from 09/14, TSH was normal. No new concerns. No changes recommended at this time.     Essential hypertension  BP is 104/68. This is lower than previous BPs. This is to be expected given the recent additional of BB and ACE-I to the patient's treatment plan. He is tolerating these medications without adverse effect and has been able to remain active with activities such as splitting wood. He  "will continue them until directed otherwise.     Hyperlipidemia, unspecified hyperlipidemia type  The 10-year ASCVD risk score (Margot LEMUS, et al., 2019) is: 7.3%    Values used to calculate the score:      Age: 58 years      Sex: Male      Is Non- : No      Diabetic: No      Tobacco smoker: No      Systolic Blood Pressure: 104 mmHg      Is BP treated: Yes      HDL Cholesterol: 47 mg/dL      Total Cholesterol: 198 mg/dL      Bereavement  He is having to sort through his spouse's personal possessions and says that he has found several thousand dollars hidden away around the home. He denies worsening depression or thoughts of wanting to hurt self/others. He says that he is able to do more since her passing. We discussed making plans for socialization in the winter and to reach out if he feels that additional supports are needed.        MED REC REQUIRED  Post Medication Reconciliation Status:  Discharge medications reconciled, continue medications without change  BMI:   Estimated body mass index is 28 kg/m  as calculated from the following:    Height as of this encounter: 1.842 m (6' 0.5\").    Weight as of this encounter: 94.9 kg (209 lb 4.8 oz).   Weight management plan: Discussed healthy diet and exercise guidelines    Aftab Guevara PA-C  [facility]IO Kmiball is a 58 year old, presenting for the following health issues:  Hospital F/U      9/25/2023    10:57 AM   Additional Questions   Roomed by Amrita GOLDEN   Accompanied by self       HPI - Hospital Admission Follow Up    Hospital Follow-up Visit:    Hospital/Nursing Home/IP Rehab Facility: [facility]  Date of Admission: 09/13/2023  Date of Discharge: 09/15/2023  Reason(s) for Admission: New onset A-Fib    Was your hospitalization related to COVID-19? No   Problems taking medications regularly:  None  Medication changes since discharge: Stopped Amlodipine and started Lisinopril, " Metoprolol succinate ER, and Eliquis.  Problems adhering to non-medication therapy:  None    Summary of hospitalization:  [facility] discharge summary reviewed  Diagnostic Tests/Treatments reviewed.  Follow up needed: Cardiologist in Oct 2023  Other Healthcare Providers Involved in Patient s Care:         Specialist appointment - Oct 2023 and Currently completing Holter  Update since discharge: stable.     Plan of care communicated with patient     Hospital Course  Dom Durham was admitted on 9/13/2023.  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 In ED, was found to be in afib with RVR. Trop 11, proBNP 1700. Lytes nl. CT chest showed evidence of interstitial edema and small bilateral pleural effusions. Started on diltiazem gtt. Required dose of digoxin and metoprolol dt ongoing RVR. Placed on heparin gtt for anticoagulation. Given IV Lasix for peripheral edema. Transferred to [facility] for ongoing cardiac care.   * Initially remained on diltiazem and heparin gtts. Seen by cardiology.   * Echo (TTE and JUANY) obtained on 9/14 while in rapid afib and showed EF 30-35% with mildly dilated LV and mod to severely reduced LVSF; mild MR and mild TR; no thrombus in ANGELICA.   * Bilateral LE US neg for DVT.   * Underwent cardioversion on 9/14. Remained in NSR thereafter. Anticoagulation changed from heparin gtt to Eliquis (5mg BID). Respiratory status remained stable on RA, so no additional diuresis was required. Lisinopril and metoprolol XL added given findings of reduced EF.  * Discharged on Eliquis, lisinopril 2.5mg daily, metoprolol XL 12.5mg daily.   * Will discharge on 30d cardiac monitor. Follow up with [facility] in clinic. Repeat echo in 4 wks, if no improvement in EF will need ischemic workup.     "  Hypertension  * Managing with amlodipine prior to admission.   * Started on low dose lisinopril and metoprolol this stay given findings of reduced EF, these meds were prescribed at discharge. Amlodipine held at discharge  * Follow up with cardiology and PCP as above to adjust meds further as needed.      Hypothyroidism  * Chronic and stable on levothyroxine     Alcohol Use  Abnl LFTs  * Labs notable for AST/ALT 60/54, total bili 1.5, remainder of LFTs stable. Reported increased EtOH use in the past month after patient's wife . No s/sx of withdrawal this stay. Counseled on cessation. Labs remained stable during stay.     Paola Damon,     Review of Systems   Constitutional, HEENT, cardiovascular, pulmonary, gi and gu systems are negative, except as otherwise noted.      Objective    /68   Pulse 75   Temp 97.2  F (36.2  C) (Temporal)   Resp 16   Ht 1.842 m (6' 0.5\")   Wt 94.9 kg (209 lb 4.8 oz)   SpO2 97%   BMI 28.00 kg/m    Body mass index is 28 kg/m .  Physical Exam   GENERAL: healthy, alert and no distress  EYES: Eyes grossly normal to inspection, PERRL and conjunctivae and sclerae normal  HENT: ear canals and TM's normal, nose and mouth without ulcers or lesions  NECK: no adenopathy, no asymmetry, masses, or scars and thyroid normal to palpation  RESP: lungs clear to auscultation - no rales, rhonchi or wheezes  CV: regular rate and rhythm, normal S1 S2, no S3 or S4, no murmur, click or rub, no peripheral edema and peripheral pulses strong  MS: no gross musculoskeletal defects noted, no edema  PSYCH: mentation appears normal, affect normal/bright    Admission on 2023, Discharged on 09/15/2023   Component Date Value Ref Range Status    Magnesium 2023 1.8  1.7 - 2.3 mg/dL Final    LVEF  2023 30-35%   Final    Sodium 2023 138  136 - 145 mmol/L Final    Potassium 2023 4.1  3.4 - 5.3 mmol/L Final    Chloride 2023 106  98 - 107 mmol/L Final    Carbon " Dioxide (CO2) 09/14/2023 20 (L)  22 - 29 mmol/L Final    Anion Gap 09/14/2023 12  7 - 15 mmol/L Final    Urea Nitrogen 09/14/2023 9.2  8.0 - 23.0 mg/dL Final    Creatinine 09/14/2023 1.03  0.67 - 1.17 mg/dL Final    Calcium 09/14/2023 9.4  8.8 - 10.2 mg/dL Final    Glucose 09/14/2023 102 (H)  70 - 99 mg/dL Final    GFR Estimate 09/14/2023 83  >60 mL/min/1.73m2 Final    Protein Total 09/14/2023 6.1 (L)  6.4 - 8.3 g/dL Final    Albumin 09/14/2023 3.8  3.5 - 5.2 g/dL Final    Bilirubin Total 09/14/2023 1.5 (H)  <=1.2 mg/dL Final    Alkaline Phosphatase 09/14/2023 68  40 - 129 U/L Final    AST 09/14/2023 60 (H)  0 - 45 U/L Final    Reference intervals for this test were updated on 6/12/2023 to more accurately reflect our healthy population. There may be differences in the flagging of prior results with similar values performed with this method. Interpretation of those prior results can be made in the context of the updated reference intervals.    ALT 09/14/2023 54  0 - 70 U/L Final    Reference intervals for this test were updated on 6/12/2023 to more accurately reflect our healthy population. There may be differences in the flagging of prior results with similar values performed with this method. Interpretation of those prior results can be made in the context of the updated reference intervals.      Bilirubin Direct 09/14/2023 0.38 (H)  0.00 - 0.30 mg/dL Final    WBC Count 09/14/2023 8.6  4.0 - 11.0 10e3/uL Final    RBC Count 09/14/2023 4.96  4.40 - 5.90 10e6/uL Final    Hemoglobin 09/14/2023 16.5  13.3 - 17.7 g/dL Final    Hematocrit 09/14/2023 48.6  40.0 - 53.0 % Final    MCV 09/14/2023 98  78 - 100 fL Final    MCH 09/14/2023 33.3 (H)  26.5 - 33.0 pg Final    MCHC 09/14/2023 34.0  31.5 - 36.5 g/dL Final    RDW 09/14/2023 12.9  10.0 - 15.0 % Final    Platelet Count 09/14/2023 166  150 - 450 10e3/uL Final    TSH 09/14/2023 3.41  0.30 - 4.20 uIU/mL Final    Anti Xa Unfractionated Heparin 09/14/2023 0.85  For Reference  Range, See Comment IU/mL Final    N Terminal Pro BNP Outpatient 09/14/2023 1,794 (H)  0 - 900 pg/mL Final    Reference range shown and results flagged as abnormal are for the outpatient, non acute settings. Establishing a baseline value for each individual patient is useful for follow-up.    Suggested inpatient cut points for confirming diagnosis of CHF in an acute setting are:  >450 pg/mL (age 18 to less than 50)  >900 pg/mL (age 50 to less than 75)  >1800 pg/mL (75 yrs and older)    An inpatient or emergency department NT-proPBNP <300 pg/mL effectively rules out acute CHF, with 99% negative predictive value.        Anti Xa Unfractionated Heparin 09/14/2023 0.23  For Reference Range, See Comment IU/mL Final    LVEF  09/14/2023 30-35%   Final    INR 09/14/2023 1.30 (H)  0.85 - 1.15 Final    Magnesium 09/14/2023 1.8  1.7 - 2.3 mg/dL Final    Ventricular Rate 09/14/2023 118  BPM Final    Atrial Rate 09/14/2023 159  BPM Final    QRS Duration 09/14/2023 98  ms Final    QT 09/14/2023 264  ms Final    QTc 09/14/2023 370  ms Final    R AXIS 09/14/2023 93  degrees Final    T Axis 09/14/2023 208  degrees Final    Interpretation ECG 09/14/2023    Final                    Value:Atrial fibrillation with rapid ventricular response  Rightward axis  Nonspecific T wave abnormality  Abnormal ECG  No previous ECGs available  Confirmed by MD SHAMIKA, MIRIAM ([id number]),  MANE STEVEN ([id number]) on 9/20/2023 8:18:29 AM      Ventricular Rate 09/14/2023 59  BPM Final    Atrial Rate 09/14/2023 59  BPM Final    NV Interval 09/14/2023 148  ms Final    QRS Duration 09/14/2023 92  ms Final    QT 09/14/2023 442  ms Final    QTc 09/14/2023 437  ms Final    P Axis 09/14/2023 47  degrees Final    R AXIS 09/14/2023 74  degrees Final    T Axis 09/14/2023 116  degrees Final    Interpretation ECG 09/14/2023    Final                    Value:Sinus bradycardia  Cannot rule out Anterior infarct , age undetermined  Abnormal ECG  When compared with ECG of  14-SEP-2023 10:57, (unconfirmed)  Sinus rhythm has replaced Atrial fibrillation  Vent. rate has decreased BY  59 BPM  Nonspecific T wave abnormality, improved in Inferior leads  Nonspecific T wave abnormality, improved in Anterolateral leads  Confirmed by MD SHAMIKA, MIRIAM (1987),  Braulio Dang ([id number]) on 9/20/2023 8:21:13 AM      Magnesium 09/15/2023 2.0  1.7 - 2.3 mg/dL Final    Protein Total 09/15/2023 6.5  6.4 - 8.3 g/dL Final    Albumin 09/15/2023 4.1  3.5 - 5.2 g/dL Final    Bilirubin Total 09/15/2023 1.1  <=1.2 mg/dL Final    Alkaline Phosphatase 09/15/2023 73  40 - 129 U/L Final    AST 09/15/2023 60 (H)  0 - 45 U/L Final    Reference intervals for this test were updated on 6/12/2023 to more accurately reflect our healthy population. There may be differences in the flagging of prior results with similar values performed with this method. Interpretation of those prior results can be made in the context of the updated reference intervals.    ALT 09/15/2023 64  0 - 70 U/L Final    Reference intervals for this test were updated on 6/12/2023 to more accurately reflect our healthy population. There may be differences in the flagging of prior results with similar values performed with this method. Interpretation of those prior results can be made in the context of the updated reference intervals.      Bilirubin Direct 09/15/2023 0.37 (H)  0.00 - 0.30 mg/dL Final    Potassium 09/15/2023 4.2  3.4 - 5.3 mmol/L Final

## 2023-10-18 ENCOUNTER — HOSPITAL ENCOUNTER (OUTPATIENT)
Dept: CARDIOLOGY | Facility: CLINIC | Age: 58
Discharge: HOME | End: 2023-10-18
Attending: INTERNAL MEDICINE | Admitting: INTERNAL MEDICINE
Payer: COMMERCIAL

## 2023-10-18 DIAGNOSIS — I42.9 CARDIOMYOPATHY, UNSPECIFIED TYPE (H): ICD-10-CM

## 2023-10-18 LAB — LVEF ECHO: NORMAL

## 2023-10-18 PROCEDURE — 93325 DOPPLER ECHO COLOR FLOW MAPG: CPT | Mod: 26 | Performed by: INTERNAL MEDICINE

## 2023-10-18 PROCEDURE — 255N000002 HC RX 255 OP 636: Performed by: INTERNAL MEDICINE

## 2023-10-18 PROCEDURE — 93308 TTE F-UP OR LMTD: CPT | Mod: 26 | Performed by: INTERNAL MEDICINE

## 2023-10-18 PROCEDURE — C8924 2D TTE W OR W/O FOL W/CON,FU: HCPCS

## 2023-10-18 PROCEDURE — 93321 DOPPLER ECHO F-UP/LMTD STD: CPT | Mod: 26 | Performed by: INTERNAL MEDICINE

## 2023-10-18 RX ADMIN — HUMAN ALBUMIN MICROSPHERES AND PERFLUTREN 6 ML: 10; .22 INJECTION, SOLUTION INTRAVENOUS at 10:31

## 2023-10-24 ENCOUNTER — OFFICE VISIT (OUTPATIENT)
Dept: CARDIOLOGY | Facility: CLINIC | Age: 58
End: 2023-10-24
Payer: COMMERCIAL

## 2023-10-24 VITALS
OXYGEN SATURATION: 97 % | BODY MASS INDEX: 29.62 KG/M2 | HEIGHT: 73 IN | WEIGHT: 223.5 LBS | HEART RATE: 74 BPM | SYSTOLIC BLOOD PRESSURE: 128 MMHG | RESPIRATION RATE: 18 BRPM | DIASTOLIC BLOOD PRESSURE: 76 MMHG

## 2023-10-24 DIAGNOSIS — I48.91 ATRIAL FIBRILLATION WITH RVR (H): ICD-10-CM

## 2023-10-24 DIAGNOSIS — I42.9 CARDIOMYOPATHY, UNSPECIFIED TYPE (H): ICD-10-CM

## 2023-10-24 DIAGNOSIS — I48.91 NEW ONSET A-FIB (H): Primary | ICD-10-CM

## 2023-10-24 PROCEDURE — 93000 ELECTROCARDIOGRAM COMPLETE: CPT | Performed by: NURSE PRACTITIONER

## 2023-10-24 PROCEDURE — 99215 OFFICE O/P EST HI 40 MIN: CPT | Mod: 25 | Performed by: NURSE PRACTITIONER

## 2023-10-24 RX ORDER — METOPROLOL SUCCINATE 25 MG/1
50 TABLET, EXTENDED RELEASE ORAL DAILY
Qty: 90 TABLET | Refills: 3 | Status: SHIPPED | OUTPATIENT
Start: 2023-10-24 | End: 2024-01-16

## 2023-10-24 ASSESSMENT — PAIN SCALES - GENERAL: PAINLEVEL: NO PAIN (0)

## 2023-10-24 NOTE — NURSING NOTE
DCCV/JUANY prep instructions    Patient is scheduled for a JUANY with Cardioversion at [facility] - [address], [place] [address] - Main Entrance of the Hospital, on 10/30/23.  Check in time is at 0730 and procedure to follow.    Patient instructed to remain NPO for solid foods 8 hours prior to arrival and may have clear liquids up to 2 hours prior to arrival.    Patient is taking Pradaxa/Xarelto/Eliquis and has been taking daily uninterrupted for at least 21days.     Patient is not diabetic.     Patient is not taking Digoxin.    Patient is taking not on diuretics. and has been advised to hold this the morning of the procedure.    Pt is not on a SGLT2 inhibitor.    Pt is not on a GLP-1 Agonist    Patient advised to take their other daily medications the morning of the procedure with small sips of water.     Verified patient has someone available to drive them home from the hospital and can stay with them for 24 hours after the procedure.     Patient advised to notify care team with any new COVID like symptoms prior to procedure.    Patient will check their temperature the morning of procedure and call [facility] at [phone] if temp is >100.0.    Patient is aware of visitor policy.    Patient expresses understanding of above instructions and denies further questions at this time.      ZACHARY Pinto   [facility]

## 2023-10-24 NOTE — PATIENT INSTRUCTIONS
TODAY'S RECOMMENDATIONS:    Please restart metoprolol XL 50 mg daily and Eliquis 5 mg twice a day today  EKG on Thursday  If remains in atrial fibrillation, recommend JUANY/DCCV as scheduled on Monday.  Please follow up with cardiology in 1 month post procedure.    If you have questions or concerns please call clinic at [phone].    Please call [phone] for scheduling.      It was a pleasure seeing you today!

## 2023-10-24 NOTE — PROGRESS NOTES
General Cardiology Clinic Progress Note  Dom Durham MRN# [id number]   YOB: 1965 Age: 58 year old     Primary cardiologist: Needs to establish MD in [place]    Reason for visit: Discharge follow up    History of presenting illness:    Dom Durham is a pleasant 58 year old patient with past medical history significant for:    New paroxysmal atrial fibrillation with RVR  [id number] Score 1 (hypertension  Cardiomyopathy  Hypertension  Obesity  EtOH dependance   Hyperlipidemia  Hypothyroidism  Untreated sleep apnea    The patient was admitted to [facility] beds at the [facility] on 9/14/2023 as a transfer from [facility] with weeping ulcers of his legs.  He was found to be in atrial fibrillation with RVR.  The patient's wife had passed away in July and he had been drinking more alcohol than usual.  Several days prior to admission he noticed worsening dyspnea on exertion as well as orthopnea.  His EKG in the emergency department demonstrated atrial fibrillation with RVR.  His other laboratory values were normal and his CT was negative for PE.  He was placed on diltiazem infusion as well as metoprolol 25 mg twice daily and a heparin drip.    He was evaluated by Dr. Cano in consultation at [facility] and ultimately underwent a JUANY guided cardioversion.  His echocardiogram while in AF with RVR showed EF of 30 to 35% with mild dilatation of the LV.  He was placed on GDMT with lisinopril 2.5 mg daily, metoprolol XL 12.5 mg twice daily and on anticoagulation with apixaban 5 mg twice daily.    Today the patient returns for a follow-up.  Unfortunately, his EKG reveals atrial fibrillation with a rate of 196 bpm.  The patient is unaware that he is in A-fib with RVR.  He also had a repeat echocardiogram on 10/18/2023 that noted his LVEF had improved to 50 to 55%.  He states that he has not taken his cardiac medications since he ran out approximately on 10/15/2023.      Since his rates are significantly elevated in  atrial fibrillation my preference would be to have him present to the ED for cardioversion today.  Unfortunately, he has not been on full anticoagulation x3 weeks therefore this would not be possible.  Thankfully, the patient is asymptomatic.     Since losing his wife in July the patient states that he is unsure if he is able to find transportation for recommended JUANY guided cardioversion that will need to be performed at [facility].  We discussed the importance of rate control and ideally rhythm control to prevent a recurrent tach induced cardiomyopathy.  If patient is unable to arrange transportation, as long as he is rate controlled we would cardiovert him after 3 weeks of anticoagulation without interruption.    Diagnotic studies:  EKG (10/24/2023): AF with RVR  Echocardiogram (10/18/2023): LVEF of 50 to 55% without wall motion or valvular abnormalities.  Event monitor (9/15/2023): Sinus rhythm throughout with brief runs of SVT (less than 10 seconds)  Echocardiogram (9/14/2023): LVEF 30-35%          Assessment and Plan:     ASSESSMENT:    Atrial fibrillation, new diagnosis (asymptomatic)  [id number] score of 1 anticoagulated on apixaban 5 mg twice daily (has not been taken since 10/15/2023):  30-day event monitor worn after discharge did not note any recurrent A-fib  EKG today notes atrial fibrillation with RVR to 196 bpm (patient has not taken metoprolol since approximately 10/15/2023):    Cardiomyopathy  Etiology: Likely tachycardia mediated  LVEF 30 to 35% on 9/14/2023 and improved to 50 to 55% on 10/18/2023  He was discharged on lisinopril 2.5 mg twice daily and metoprolol XL 12.5 mg twice daily which he has not taken either since approximately 10/15/2023  Discharge weight on 9/15/2023 was 217 pounds and weight today is 223 pounds    Hypertension  Well-controlled    PLAN:     Restart Eliquis at 5 mg twice daily and metoprolol XL at 50 mg daily  We will hold lisinopril at this time to allow BP room for  metoprolol  EKG on Thursday and if patient is in sinus rhythm we will cancel upcoming JUANY guided cardioversion  If remains in atrial fibrillation, proceed with a JUANY guided cardioversion on 10/30  Alternatively, if transportation is not need to be found and patient is rate controlled in atrial fibrillation, we could proceed with cardioversion without JUANY in [place] after 3 weeks of full anticoagulation  Return to clinic in approximately 2 to 4 weeks    Patient has no history of esophageal stricture, odynphagia, dysphagia, airway problems, or medication allergies.     All risks and benefits for transesophageal echocardiogram have been explained to the patient.  This includes but is not limited to damage to the oral cavity, esophageal perforation, GI bleeding, pharyngeal hematoma, transient bronchospasm, transient hypoxia, arrhthymias (NSVT, transient atrial fibrillation), vomiting, hemoptysis, and complications from anesthesia. Patient understands and wishes to proceed with it. A formal consent form will be signed by the procedural physician.    We discussed Risk, Benefits and Indication of proceeding with direct current cardioversion (DCCV), including but not limited to use of anesthesia, surface burns, [id number] arrhythmias requiring further treatment, discomfort and stroke.  The patient voiced understanding and understands that a  will be needed given the use of conscious sedation. A consent form will be signed by the procedural physician.     Orders this Visit:  Orders Placed This Encounter   Procedures    Follow-Up with Cardiology- ANA ROSA    EKG 12-lead complete w/read - Clinics (performed today)    EKG 12-LEAD CLINIC READ    Cardioversion    Transesophageal Echocardiogram     Orders Placed This Encounter   Medications    metoprolol succinate ER (TOPROL XL) 25 MG 24 hr tablet     Sig: Take 2 tablets (50 mg) by mouth daily     Dispense:  90 tablet     Refill:  3    apixaban ANTICOAGULANT (ELIQUIS) 5 MG  "tablet     Sig: Take 1 tablet (5 mg) by mouth 2 times daily     Dispense:  60 tablet     Refill:  3     Medications Discontinued During This Encounter   Medication Reason    metoprolol succinate ER (TOPROL XL) 25 MG 24 hr tablet Reorder (No AVS)    lisinopril (ZESTRIL) 2.5 MG tablet        Today's clinic visit entailed:  Review of the result(s) of each unique test - echo, JUANY, cardioversion, EKG, BMP  Ordering of each unique test  Prescription drug management  40 minutes spent by me on the date of the encounter doing chart review, history and exam, documentation and further activities per the note  Provider  Link to [facility] Help Grid     The level of medical decision making during this visit was of moderate complexity.           Review of Systems:     Review of Systems:  Skin:        Eyes:       ENT:       Respiratory:  Negative shortness of breath;cough;wheezing  Cardiovascular:  Negative;edema;lightheadedness;dizziness;syncope or near-syncope;fatigue Positive for;palpitations;chest pain  Gastroenterology:      Genitourinary:       Musculoskeletal:       Neurologic:  Negative numbness or tingling of hands;numbness or tingling of feet  Psychiatric:       Heme/Lymph/Imm:  Positive for allergies  Endocrine:                 Physical Exam:     Vitals: /76 (BP Location: Right arm, Patient Position: Sitting, Cuff Size: Adult Large)   Pulse 74   Resp 18   Ht 1.842 m (6' 0.5\")   Wt 101.4 kg (223 lb 8 oz)   SpO2 97%   BMI 29.90 kg/m    Constitutional: Well nourished and in no apparent distress.  Eyes: Pupils equal, round. Sclerae anicteric.   HEENT: Normocephalic, atraumatic.   Neck: Supple.   Respiratory: Breathing non-labored. Lungs clear to auscultation bilaterally. No crackles, wheezes, rhonchi, or rales.  Cardiovascular: IRR rate and rhythm, normal S1 and S2. No murmur, rub, or gallop.  Skin: Warm, dry. No rashes, cyanosis, or xanthelasma.  Extremities: 1+ LE edema   Neurologic: No gross motor deficits. Alert, " "awake, and oriented to person, place and time.  Psychiatric: Affect appropriate.        CURRENT MEDICATIONS:  Current Outpatient Medications   Medication Sig Dispense Refill    apixaban ANTICOAGULANT (ELIQUIS) 5 MG tablet Take 1 tablet (5 mg) by mouth 2 times daily 60 tablet 3    levothyroxine (SYNTHROID/LEVOTHROID) 88 MCG tablet Take 1 tablet (88 mcg) by mouth daily 90 tablet 4    metoprolol succinate ER (TOPROL XL) 25 MG 24 hr tablet Take 2 tablets (50 mg) by mouth daily 90 tablet 3    tobramycin (TOBREX) 0.3 % ophthalmic solution Place 1-2 drops into both eyes every 2 hours (Patient not taking: Reported on 10/24/2023) 5 mL 0       ALLERGIES  Allergies   Allergen Reactions    Amoxicillin-Pot Clavulanate Other (See Comments)     \"I was so weak and just felt like dying\"    Dilaudid [Hydromorphone] Nausea and Vomiting     \"ok with Percocet\"    Hydrocodone Nausea     \"also did not do anything for my pain\"         PAST MEDICAL HISTORY:  Past Medical History:   Diagnosis Date    History of ankle fracture     5 times left, 2 times right    Hypertension     Thyroid dysfunction        PAST SURGICAL HISTORY:  Past Surgical History:   Procedure Laterality Date    ANESTHESIA CARDIOVERSION N/A 9/14/2023    Procedure: Anesthesia cardioversion;  Surgeon: GENERIC ANESTHESIA PROVIDER;  Location: [facility] OR    ANKLE SURGERY  1984    COLONOSCOPY N/A 6/10/2016    Procedure: COLONOSCOPY;  Surgeon: Jarek Chaudhari MD;  Location:  GI    COLONOSCOPY N/A 2/9/2023    Procedure: COLONOSCOPY;  Surgeon: Vitaliy Huff DO;  Location:  GI    ESOPHAGOSCOPY, GASTROSCOPY, DUODENOSCOPY (EGD), COMBINED  5/24/2013    Procedure: COMBINED ESOPHAGOSCOPY, GASTROSCOPY, DUODENOSCOPY (EGD), BIOPSY SINGLE OR MULTIPLE;;  Surgeon: Timothy Jennings MD;  Location:  GI    LAPAROSCOPY DIAGNOSTIC (GENERAL)  8/14/2013    Procedure: LAPAROSCOPY DIAGNOSTIC (GENERAL);  Diagnostic Laparoscopy;  Surgeon: Stu Arias MD;  Location:  OR    [facility] " SURGERY         FAMILY HISTORY:  Family History   Problem Relation Age of Onset    Hypertension Father     Prostate Cancer Father     Cancer Father         primary not known    Thyroid Disease Sister     Liver Disease Brother     Liver Disease Brother     Colon Cancer No family hx of        SOCIAL HISTORY:  Social History     Socioeconomic History    Marital status:      Spouse name: None    Number of children: None    Years of education: None    Highest education level: None   Tobacco Use    Smoking status: Never    Smokeless tobacco: Never   Vaping Use    Vaping Use: Never used   Substance and Sexual Activity    Alcohol use: Yes     Alcohol/week: 6.0 standard drinks of alcohol     Types: 6 Shots of liquor per week    Drug use: No    Sexual activity: Yes     Partners: Female     Social Determinants of Health     Financial Resource Strain: Unknown (9/25/2023)    Financial Resource Strain     Within the past 12 months, have you or your family members you live with been unable to get utilities (heat, electricity) when it was really needed?: Patient refused   Food Insecurity: Unknown (9/25/2023)    Food Insecurity     Within the past 12 months, did you worry that your food would run out before you got money to buy more?: Patient refused     Within the past 12 months, did the food you bought just not last and you didn t have money to get more?: Patient refused   Transportation Needs: Unknown (9/25/2023)    Transportation Needs     Within the past 12 months, has lack of transportation kept you from medical appointments, getting your medicines, non-medical meetings or appointments, work, or from getting things that you need?: Patient refused   Interpersonal Safety: Low Risk  (9/25/2023)    Interpersonal Safety     Do you feel physically and emotionally safe where you currently live?: Yes     Within the past 12 months, have you been hit, slapped, kicked or otherwise physically hurt by someone?: No     Within the  past 12 months, have you been humiliated or emotionally abused in other ways by your partner or ex-partner?: No   Housing Stability: Unknown (9/25/2023)    Housing Stability     Do you have housing? : Patient refused     Are you worried about losing your housing?: Patient refused

## 2023-10-24 NOTE — LETTER
10/24/2023    Aftab Guevara PA-C  [address] [facility] [address]    RE: Dom Durham       Dear Colleague,     I had the pleasure of seeing Dom Durham in the [facility].    General Cardiology Clinic Progress Note  Dom Durham MRN# [id number]   YOB: 1965 Age: 58 year old     Primary cardiologist: Needs to establish MD in [place]    Reason for visit: Discharge follow up    History of presenting illness:    Dom Durham is a pleasant 58 year old patient with past medical history significant for:    New paroxysmal atrial fibrillation with RVR  [id number] Score 1 (hypertension  Cardiomyopathy  Hypertension  Obesity  EtOH dependance   Hyperlipidemia  Hypothyroidism  Untreated sleep apnea    The patient was admitted to [facility] on 9/14/2023 as a transfer from [facility] with weeping ulcers of his legs.  He was found to be in atrial fibrillation with RVR.  The patient's wife had passed away in July and he had been drinking more alcohol than usual.  Several days prior to admission he noticed worsening dyspnea on exertion as well as orthopnea.  His EKG in the emergency department demonstrated atrial fibrillation with RVR.  His other laboratory values were normal and his CT was negative for PE.  He was placed on diltiazem infusion as well as metoprolol 25 mg twice daily and a heparin drip.    He was evaluated by Dr. Cano in consultation at [facility] and ultimately underwent a JUANY guided cardioversion.  His echocardiogram while in AF with RVR showed EF of 30 to 35% with mild dilatation of the LV.  He was placed on GDMT with lisinopril 2.5 mg daily, metoprolol XL 12.5 mg twice daily and on anticoagulation with apixaban 5 mg twice daily.    Today the patient returns for a follow-up.  Unfortunately, his EKG reveals atrial fibrillation with a rate of 196 bpm.  The patient is unaware that he is in A-fib with RVR.  He also had a repeat echocardiogram on  10/18/2023 that noted his LVEF had improved to 50 to 55%.  He states that he has not taken his cardiac medications since he ran out approximately on 10/15/2023.      Since his rates are significantly elevated in atrial fibrillation my preference would be to have him present to the ED for cardioversion today.  Unfortunately, he has not been on full anticoagulation x3 weeks therefore this would not be possible.  Thankfully, the patient is asymptomatic.     Since losing his wife in July the patient states that he is unsure if he is able to find transportation for recommended JUANY guided cardioversion that will need to be performed at [facility].  We discussed the importance of rate control and ideally rhythm control to prevent a recurrent tach induced cardiomyopathy.  If patient is unable to arrange transportation, as long as he is rate controlled we would cardiovert him after 3 weeks of anticoagulation without interruption.    Diagnotic studies:  EKG (10/24/2023): AF with RVR  Echocardiogram (10/18/2023): LVEF of 50 to 55% without wall motion or valvular abnormalities.  Event monitor (9/15/2023): Sinus rhythm throughout with brief runs of SVT (less than 10 seconds)  Echocardiogram (9/14/2023): LVEF 30-35%          Assessment and Plan:     ASSESSMENT:    Atrial fibrillation, new diagnosis (asymptomatic)  [id number] score of 1 anticoagulated on apixaban 5 mg twice daily (has not been taken since 10/15/2023):  30-day event monitor worn after discharge did not note any recurrent A-fib  EKG today notes atrial fibrillation with RVR to 196 bpm (patient has not taken metoprolol since approximately 10/15/2023):    Cardiomyopathy  Etiology: Likely tachycardia mediated  LVEF 30 to 35% on 9/14/2023 and improved to 50 to 55% on 10/18/2023  He was discharged on lisinopril 2.5 mg twice daily and metoprolol XL 12.5 mg twice daily which he has not taken either since approximately 10/15/2023  Discharge weight on 9/15/2023 was 217  pounds and weight today is 223 pounds    Hypertension  Well-controlled    PLAN:     Restart Eliquis at 5 mg twice daily and metoprolol XL at 50 mg daily  We will hold lisinopril at this time to allow BP room for metoprolol  EKG on Thursday and if patient is in sinus rhythm we will cancel upcoming JUANY guided cardioversion  If remains in atrial fibrillation, proceed with a JUANY guided cardioversion on 10/30  Alternatively, if transportation is not need to be found and patient is rate controlled in atrial fibrillation, we could proceed with cardioversion without JUANY in [place] after 3 weeks of full anticoagulation  Return to clinic in approximately 2 to 4 weeks    Patient has no history of esophageal stricture, odynphagia, dysphagia, airway problems, or medication allergies.     All risks and benefits for transesophageal echocardiogram have been explained to the patient.  This includes but is not limited to damage to the oral cavity, esophageal perforation, GI bleeding, pharyngeal hematoma, transient bronchospasm, transient hypoxia, arrhthymias (NSVT, transient atrial fibrillation), vomiting, hemoptysis, and complications from anesthesia. Patient understands and wishes to proceed with it. A formal consent form will be signed by the procedural physician.    We discussed Risk, Benefits and Indication of proceeding with direct current cardioversion (DCCV), including but not limited to use of anesthesia, surface burns, [id number] arrhythmias requiring further treatment, discomfort and stroke.  The patient voiced understanding and understands that a  will be needed given the use of conscious sedation. A consent form will be signed by the procedural physician.     Orders this Visit:  Orders Placed This Encounter   Procedures    Follow-Up with Cardiology- ANA ROSA    EKG 12-lead complete w/read - Clinics (performed today)    EKG 12-LEAD CLINIC READ    Cardioversion    Transesophageal Echocardiogram     Orders Placed  "This Encounter   Medications    metoprolol succinate ER (TOPROL XL) 25 MG 24 hr tablet     Sig: Take 2 tablets (50 mg) by mouth daily     Dispense:  90 tablet     Refill:  3    apixaban ANTICOAGULANT (ELIQUIS) 5 MG tablet     Sig: Take 1 tablet (5 mg) by mouth 2 times daily     Dispense:  60 tablet     Refill:  3     Medications Discontinued During This Encounter   Medication Reason    metoprolol succinate ER (TOPROL XL) 25 MG 24 hr tablet Reorder (No AVS)    lisinopril (ZESTRIL) 2.5 MG tablet        Today's clinic visit entailed:  Review of the result(s) of each unique test - echo, JUANY, cardioversion, EKG, BMP  Ordering of each unique test  Prescription drug management  40 minutes spent by me on the date of the encounter doing chart review, history and exam, documentation and further activities per the note  Provider  Link to [facility] Help Grid     The level of medical decision making during this visit was of moderate complexity.           Review of Systems:     Review of Systems:  Skin:        Eyes:       ENT:       Respiratory:  Negative shortness of breath;cough;wheezing  Cardiovascular:  Negative;edema;lightheadedness;dizziness;syncope or near-syncope;fatigue Positive for;palpitations;chest pain  Gastroenterology:      Genitourinary:       Musculoskeletal:       Neurologic:  Negative numbness or tingling of hands;numbness or tingling of feet  Psychiatric:       Heme/Lymph/Imm:  Positive for allergies  Endocrine:                 Physical Exam:     Vitals: /76 (BP Location: Right arm, Patient Position: Sitting, Cuff Size: Adult Large)   Pulse 74   Resp 18   Ht 1.842 m (6' 0.5\")   Wt 101.4 kg (223 lb 8 oz)   SpO2 97%   BMI 29.90 kg/m    Constitutional: Well nourished and in no apparent distress.  Eyes: Pupils equal, round. Sclerae anicteric.   HEENT: Normocephalic, atraumatic.   Neck: Supple.   Respiratory: Breathing non-labored. Lungs clear to auscultation bilaterally. No crackles, wheezes, rhonchi, or " "rales.  Cardiovascular: IRR rate and rhythm, normal S1 and S2. No murmur, rub, or gallop.  Skin: Warm, dry. No rashes, cyanosis, or xanthelasma.  Extremities: 1+ LE edema   Neurologic: No gross motor deficits. Alert, awake, and oriented to person, place and time.  Psychiatric: Affect appropriate.        CURRENT MEDICATIONS:  Current Outpatient Medications   Medication Sig Dispense Refill    apixaban ANTICOAGULANT (ELIQUIS) 5 MG tablet Take 1 tablet (5 mg) by mouth 2 times daily 60 tablet 3    levothyroxine (SYNTHROID/LEVOTHROID) 88 MCG tablet Take 1 tablet (88 mcg) by mouth daily 90 tablet 4    metoprolol succinate ER (TOPROL XL) 25 MG 24 hr tablet Take 2 tablets (50 mg) by mouth daily 90 tablet 3    tobramycin (TOBREX) 0.3 % ophthalmic solution Place 1-2 drops into both eyes every 2 hours (Patient not taking: Reported on 10/24/2023) 5 mL 0       ALLERGIES  Allergies   Allergen Reactions    Amoxicillin-Pot Clavulanate Other (See Comments)     \"I was so weak and just felt like dying\"    Dilaudid [Hydromorphone] Nausea and Vomiting     \"ok with Percocet\"    Hydrocodone Nausea     \"also did not do anything for my pain\"         PAST MEDICAL HISTORY:  Past Medical History:   Diagnosis Date    History of ankle fracture     5 times left, 2 times right    Hypertension     Thyroid dysfunction        PAST SURGICAL HISTORY:  Past Surgical History:   Procedure Laterality Date    ANESTHESIA CARDIOVERSION N/A 9/14/2023    Procedure: Anesthesia cardioversion;  Surgeon: GENERIC ANESTHESIA PROVIDER;  Location: [facility] OR    ANKLE SURGERY  1984    COLONOSCOPY N/A 6/10/2016    Procedure: COLONOSCOPY;  Surgeon: Jarek Chaudhari MD;  Location:  GI    COLONOSCOPY N/A 2/9/2023    Procedure: COLONOSCOPY;  Surgeon: Vitaliy Huff DO;  Location:  GI    ESOPHAGOSCOPY, GASTROSCOPY, DUODENOSCOPY (EGD), COMBINED  5/24/2013    Procedure: COMBINED ESOPHAGOSCOPY, GASTROSCOPY, DUODENOSCOPY (EGD), BIOPSY SINGLE OR MULTIPLE;;  Surgeon: " Timothy Jennings MD;  Location: [facility] GI    LAPAROSCOPY DIAGNOSTIC (GENERAL)  8/14/2013    Procedure: LAPAROSCOPY DIAGNOSTIC (GENERAL);  Diagnostic Laparoscopy;  Surgeon: Stu Arias MD;  Location: [facility] OR    SHOULDER SURGERY         FAMILY HISTORY:  Family History   Problem Relation Age of Onset    Hypertension Father     Prostate Cancer Father     Cancer Father         primary not known    Thyroid Disease Sister     Liver Disease Brother     Liver Disease Brother     Colon Cancer No family hx of        SOCIAL HISTORY:  Social History     Socioeconomic History    Marital status:      Spouse name: None    Number of children: None    Years of education: None    Highest education level: None   Tobacco Use    Smoking status: Never    Smokeless tobacco: Never   Vaping Use    Vaping Use: Never used   Substance and Sexual Activity    Alcohol use: Yes     Alcohol/week: 6.0 standard drinks of alcohol     Types: 6 Shots of liquor per week    Drug use: No    Sexual activity: Yes     Partners: Female     Social Determinants of Health     Financial Resource Strain: Unknown (9/25/2023)    Financial Resource Strain     Within the past 12 months, have you or your family members you live with been unable to get utilities (heat, electricity) when it was really needed?: Patient refused   Food Insecurity: Unknown (9/25/2023)    Food Insecurity     Within the past 12 months, did you worry that your food would run out before you got money to buy more?: Patient refused     Within the past 12 months, did the food you bought just not last and you didn t have money to get more?: Patient refused   Transportation Needs: Unknown (9/25/2023)    Transportation Needs     Within the past 12 months, has lack of transportation kept you from medical appointments, getting your medicines, non-medical meetings or appointments, work, or from getting things that you need?: Patient refused   Interpersonal Safety: Low Risk  (9/25/2023)     Interpersonal Safety     Do you feel physically and emotionally safe where you currently live?: Yes     Within the past 12 months, have you been hit, slapped, kicked or otherwise physically hurt by someone?: No     Within the past 12 months, have you been humiliated or emotionally abused in other ways by your partner or ex-partner?: No   Housing Stability: Unknown (9/25/2023)    Housing Stability     Do you have housing? : Patient refused     Are you worried about losing your housing?: Patient refused     Thank you for allowing me to participate in the care of your patient.      Sincerely,     KASIE [facility]  cc:   No referring provider defined for this encounter.

## 2023-10-25 ENCOUNTER — HOSPITAL ENCOUNTER (OUTPATIENT)
Facility: CLINIC | Age: 58
End: 2023-10-25
Payer: COMMERCIAL

## 2023-10-26 ENCOUNTER — HOSPITAL ENCOUNTER (OUTPATIENT)
Dept: CARDIOLOGY | Facility: CLINIC | Age: 58
Discharge: HOME | End: 2023-10-26
Attending: NURSE PRACTITIONER | Admitting: NURSE PRACTITIONER
Payer: COMMERCIAL

## 2023-10-26 DIAGNOSIS — I48.91 ATRIAL FIBRILLATION WITH RVR (H): ICD-10-CM

## 2023-10-26 PROCEDURE — 93005 ELECTROCARDIOGRAM TRACING: CPT | Performed by: REHABILITATION PRACTITIONER

## 2023-10-26 PROCEDURE — 93010 ELECTROCARDIOGRAM REPORT: CPT | Performed by: NURSE PRACTITIONER

## 2023-11-07 ENCOUNTER — OFFICE VISIT (OUTPATIENT)
Dept: CARDIOLOGY | Facility: CLINIC | Age: 58
End: 2023-11-07
Payer: COMMERCIAL

## 2023-11-07 VITALS
OXYGEN SATURATION: 97 % | DIASTOLIC BLOOD PRESSURE: 86 MMHG | BODY MASS INDEX: 30.2 KG/M2 | SYSTOLIC BLOOD PRESSURE: 144 MMHG | HEART RATE: 64 BPM | HEIGHT: 72 IN | WEIGHT: 223 LBS

## 2023-11-07 DIAGNOSIS — I48.91 ATRIAL FIBRILLATION WITH RVR (H): Primary | ICD-10-CM

## 2023-11-07 DIAGNOSIS — I42.9 CARDIOMYOPATHY, UNSPECIFIED TYPE (H): ICD-10-CM

## 2023-11-07 DIAGNOSIS — I48.91 NEW ONSET A-FIB (H): ICD-10-CM

## 2023-11-07 PROCEDURE — 99214 OFFICE O/P EST MOD 30 MIN: CPT | Mod: 25 | Performed by: NURSE PRACTITIONER

## 2023-11-07 PROCEDURE — 93000 ELECTROCARDIOGRAM COMPLETE: CPT | Performed by: NURSE PRACTITIONER

## 2023-11-07 RX ORDER — LISINOPRIL 2.5 MG/1
2.5 TABLET ORAL DAILY
Qty: 30 TABLET | Refills: 3 | Status: SHIPPED | OUTPATIENT
Start: 2023-11-07 | End: 2024-01-16

## 2023-11-07 NOTE — PATIENT INSTRUCTIONS
Thank you for your visit with the [facility] today.    Today's Summary     You are in a regular heart rhythm today (sinus rhythm).  No need for a cardioversion at this time.  You need to CONTINUE taking Eliquis 5 mg twice daily, as well as Metoprolol 50 mg daily.  Start taking Lisinopril 2.5 mg (1 tablet) daily to lower blood pressure.  Check labs in 2-weeks.  Follow up in 3-months to reassess BP and heart rhythm.    If you have questions or concerns, please do not hesitate to call my nursing support team at [phone].    Scheduling phone number: [phone]  [facility] Clinic Number: [phone]    It was a pleasure seeing you today.     KASIE Luther, CNP  Nurse Practitioner  [facility]  November 7, 2023  ________________________________________________________

## 2023-11-07 NOTE — LETTER
11/7/2023    Aftab Guevara PA-C  [address] [facility] [address]    RE: Dom Durham       Dear Colleague,     I had the pleasure of seeing Dom Durham in the [facility].              ~Cardiology Clinic Visit~    Primary Cardiologist: Needs MD establish in [place]  Reason for visit: Afib follow up    History of Present Illness    Dom Durham is a pleasant 58 year old patient with past medical history significant for:     New paroxysmal atrial fibrillation with RVR  [id number] Score 1 (hypertension  Cardiomyopathy  Hypertension  Obesity  EtOH dependance   Hyperlipidemia  Hypothyroidism  Untreated sleep apnea     The patient was admitted to [facility] on 9/14/2023 as a transfer from [facility] with weeping ulcers of his legs.  He was found to be in atrial fibrillation with RVR.  The patient's wife had passed away in July and he had been drinking more alcohol than usual.  Several days prior to admission he noticed worsening dyspnea on exertion as well as orthopnea.  He was placed on diltiazem infusion as well as metoprolol 25 mg twice daily and a heparin drip.     He ultimately underwent a JUANY guided cardioversion.  His echocardiogram while in AF with RVR showed EF of 30 to 35% with mild dilatation of the LV.  He was placed on GDMT with lisinopril 2.5 mg daily, metoprolol XL 12.5 mg twice daily and on anticoagulation with apixaban 5 mg twice daily.     In follow-up, his EKG unfortunately revealed atrial fibrillation with a rate of 196 bpm.  He was unaware that he was in A-fib with RVR.  A repeat echocardiogram on 10/18/2023 that noted his LVEF had improved to 50 to 55%.  At that time, he had not taken his cardiac medications since he ran out approximately on 10/15/2023.       Given elevated rates, he was recommended repeat cardioversion.  Unfortunately, he was not on uninterrupted anticoagulation.  He was also unsure if he would be able to secure transportation to [facility] for JUANY/DCCV.   Again, he was asymptomatic to the arrhythmia.     With this, his Eliquis was resumed.  Lisinopril was held, and Metoprolol increased to 50 mg daily.     Diagnotic studies:  EKG (10/24/2023): AF with RVR  Echocardiogram (10/18/2023): LVEF of 50 to 55% without wall motion or valvular abnormalities.  Event monitor (9/15/2023): Sinus rhythm throughout with brief runs of SVT (less than 10 seconds)  Echocardiogram (9/14/2023): LVEF 30-35%    Interval 11/07/23    EKG today in clinic showed SR.  Patient says that he is feeling much better on the current medications and with his heart in a regular rhythm.  He still mentions that he occasionally forgets to take Eliquis, and we discussed the importance of that.  __________________________________________________________________         Assessment and Impression:     Atrial fibrillation, new diagnosis (asymptomatic)  [id number] score of 1 anticoagulated on apixaban 5 mg twice daily.    Anticoagulation resumed 10/24/23.  EKG today notes NSR  On Metoprolol XL 50 mg daily.     Cardiomyopathy  Etiology: Likely tachycardia mediated  LVEF 30 to 35% on 9/14/2023 and improved to 50 to 55% on 10/18/2023  Stopped GDMT 2/2 running out (10/15/2023.)  Resumed Toprol XL 50 mg daily.  Others on hold to allow for arrhythmia management.  Discharge weight on 9/15/2023 was 217 pounds and weight today is 223 pounds     Hypertension  Well-controlled         Recommendations and Plan:     SR today on EKG; no need for cardioversion.  Continue Eliquis 5 mg twice daily.  Continue Toprol XL 50 mg daily.  Restart Lisinopril 2.5 mg daily; recheck BMP in 2-weeks.  Follow up with ANA ROSA in 3-months for BP and heart rhythm review.  __________________________________________________________________    Thank you for the opportunity to participate in this pleasant patient's care.    We would be happy to see this patient sooner for any concerns in the meantime.    KASIE Luther, CNP   Nurse  "Practitioner  [facility]    Today's clinic visit entailed:  Review of the result(s) of each unique test - EKG, echo, other cardiac imaging, labs  The following tests were independently interpreted by me as noted in my documentation: ekg  Ordering of each unique test  Prescription drug management  The level of medical decision making during this visit was of moderate complexity.    Orders this Visit:  Orders Placed This Encounter   Procedures    Basic metabolic panel    Follow-Up with Cardiology- ANA ROSA    EKG 12-lead complete w/read - Clinics     Orders Placed This Encounter   Medications    lisinopril (ZESTRIL) 2.5 MG tablet     Sig: Take 1 tablet (2.5 mg) by mouth daily     Dispense:  30 tablet     Refill:  3    apixaban ANTICOAGULANT (ELIQUIS) 5 MG tablet     Sig: Take 1 tablet (5 mg) by mouth 2 times daily     Dispense:  60 tablet     Refill:  4     Medications Discontinued During This Encounter   Medication Reason    apixaban ANTICOAGULANT (ELIQUIS) 5 MG tablet Reorder (No AVS)     Encounter Diagnoses   Name Primary?    New onset a-fib (H)     Cardiomyopathy, unspecified type (H)     Atrial fibrillation with RVR (H) Yes     CURRENT MEDICATIONS:  Current Outpatient Medications   Medication Sig Dispense Refill    apixaban ANTICOAGULANT (ELIQUIS) 5 MG tablet Take 1 tablet (5 mg) by mouth 2 times daily 60 tablet 4    levothyroxine (SYNTHROID/LEVOTHROID) 88 MCG tablet Take 1 tablet (88 mcg) by mouth daily 90 tablet 4    lisinopril (ZESTRIL) 2.5 MG tablet Take 1 tablet (2.5 mg) by mouth daily 30 tablet 3    metoprolol succinate ER (TOPROL XL) 25 MG 24 hr tablet Take 2 tablets (50 mg) by mouth daily 90 tablet 3    tobramycin (TOBREX) 0.3 % ophthalmic solution Place 1-2 drops into both eyes every 2 hours (Patient not taking: Reported on 10/24/2023) 5 mL 0     ALLERGIES     Allergies   Allergen Reactions    Amoxicillin-Pot Clavulanate Other (See Comments)     \"I was so weak and just felt like dying\"    " "Dilaudid [Hydromorphone] Nausea and Vomiting     \"ok with Percocet\"    Hydrocodone Nausea     \"also did not do anything for my pain\"     PAST MEDICAL, SURGICAL, FAMILY, SOCIAL HISTORY:  History was reviewed and updated as needed, see medical record.    Review of Systems:  A 10-point Review Of Systems is otherwise normal except for that which is noted in the HPI and interval summary.    Physical Exam:    Vitals: BP (!) 144/86   Pulse 64   Ht 1.829 m (6')   Wt 101.2 kg (223 lb)   SpO2 97%   BMI 30.24 kg/m    Constitutional: Appears stated age, well nourished, NAD.  Neck: Supple. Carotid pulses full and equal.   Respiratory: Non-labored. Lungs CTAB.  Cardiovascular: RRR, normal S1 and S2. No M/G/R.  No edema.  Musculoskeletal/Extremities: Symmetrical movement to all extremities.  Neurologic: No gross focal deficits. Alert, awake, and oriented to all spheres.  Psychiatric: Affect appropriate. Mentation normal.    Recent Lab Results:  LIPID RESULTS:  Lab Results   Component Value Date    CHOL 198 01/23/2023    CHOL 195 08/24/2015    HDL 47 01/23/2023    HDL 40 (L) 08/24/2015     (H) 01/23/2023     (H) 08/24/2015    TRIG 90 01/23/2023    TRIG 65 08/24/2015    CHOLHDLRATIO 4.9 08/24/2015     LIVER ENZYME RESULTS:  Lab Results   Component Value Date    AST 60 (H) 09/15/2023    AST 44 10/09/2017    ALT 64 09/15/2023    ALT 60 10/09/2017     CBC RESULTS:  Lab Results   Component Value Date    WBC 8.6 09/14/2023    WBC 7.5 10/05/2017    RBC 4.96 09/14/2023    RBC 5.17 10/05/2017    HGB 16.5 09/14/2023    HGB 16.2 10/05/2017    HCT 48.6 09/14/2023    HCT 49.5 10/05/2017    MCV 98 09/14/2023    MCV 96 10/05/2017    MCH 33.3 (H) 09/14/2023    MCH 31.3 10/05/2017    MCHC 34.0 09/14/2023    MCHC 32.7 10/05/2017    RDW 12.9 09/14/2023    RDW 12.7 10/05/2017     09/14/2023     10/05/2017     BMP RESULTS:  Lab Results   Component Value Date     09/14/2023     10/09/2017    POTASSIUM 4.2 " "09/15/2023    POTASSIUM 4.4 10/08/2021    POTASSIUM 4.0 10/09/2017    CHLORIDE 106 09/14/2023    CHLORIDE 105 10/08/2021    CHLORIDE 105 10/09/2017    CO2 20 (L) 09/14/2023    CO2 25 10/08/2021    CO2 26 10/09/2017    ANIONGAP 12 09/14/2023    ANIONGAP 6 10/08/2021    ANIONGAP 8 10/09/2017     (H) 09/14/2023     (H) 10/08/2021    GLC 90 10/09/2017    BUN 9.2 09/14/2023    BUN 18 10/08/2021    BUN 11 10/09/2017    CR 1.03 09/14/2023    CR 0.85 10/09/2017    GFRESTIMATED 83 09/14/2023    GFRESTIMATED >90 10/09/2017    GFRESTBLACK >90 10/09/2017    SHELIA 9.4 09/14/2023    SHELIA 9.5 10/09/2017      A1C RESULTS:  No results found for: \"A1C\"  INR RESULTS:  Lab Results   Component Value Date    INR 1.30 (H) 09/14/2023                     Thank you for allowing me to participate in the care of your patient.      Sincerely,     KASIE Luther CNP     [facility]  cc:   KASIE Longo CNP  [address] [place] [address]  [place] [address]      "

## 2023-11-21 ENCOUNTER — LAB (OUTPATIENT)
Dept: LAB | Facility: CLINIC | Age: 58
End: 2023-11-21
Payer: COMMERCIAL

## 2023-11-21 DIAGNOSIS — I42.9 CARDIOMYOPATHY, UNSPECIFIED TYPE (H): ICD-10-CM

## 2023-11-21 LAB
ANION GAP SERPL CALCULATED.3IONS-SCNC: 13 MMOL/L (ref 7–15)
BUN SERPL-MCNC: 20.1 MG/DL (ref 8–23)
CALCIUM SERPL-MCNC: 9.8 MG/DL (ref 8.8–10.2)
CHLORIDE SERPL-SCNC: 103 MMOL/L (ref 98–107)
CREAT SERPL-MCNC: 1.12 MG/DL (ref 0.67–1.17)
DEPRECATED HCO3 PLAS-SCNC: 23 MMOL/L (ref 22–29)
EGFRCR SERPLBLD CKD-EPI 2021: 75 ML/MIN/1.73M2
GLUCOSE SERPL-MCNC: 102 MG/DL (ref 70–99)
POTASSIUM SERPL-SCNC: 5 MMOL/L (ref 3.4–5.3)
SODIUM SERPL-SCNC: 139 MMOL/L (ref 135–145)

## 2023-11-21 PROCEDURE — 36415 COLL VENOUS BLD VENIPUNCTURE: CPT

## 2023-11-21 PROCEDURE — 80048 BASIC METABOLIC PNL TOTAL CA: CPT

## 2024-01-16 ENCOUNTER — TELEPHONE (OUTPATIENT)
Dept: FAMILY MEDICINE | Facility: OTHER | Age: 59
End: 2024-01-16
Payer: COMMERCIAL

## 2024-01-16 DIAGNOSIS — I48.91 NEW ONSET A-FIB (H): ICD-10-CM

## 2024-01-16 DIAGNOSIS — I42.9 CARDIOMYOPATHY, UNSPECIFIED TYPE (H): ICD-10-CM

## 2024-01-16 RX ORDER — LISINOPRIL 2.5 MG/1
2.5 TABLET ORAL DAILY
Qty: 30 TABLET | Refills: 3 | Status: SHIPPED | OUTPATIENT
Start: 2024-01-16 | End: 2024-01-30

## 2024-01-16 RX ORDER — METOPROLOL SUCCINATE 25 MG/1
50 TABLET, EXTENDED RELEASE ORAL DAILY
Qty: 90 TABLET | Refills: 3 | Status: SHIPPED | OUTPATIENT
Start: 2024-01-16 | End: 2024-01-17

## 2024-01-16 NOTE — TELEPHONE ENCOUNTER
Call from pharmacy wanting clarification regarding dose of metoprolol succinate ER (TOPROL XL) 25 MG 24 hr tablet - take 2 tablets daily.   This was sent for 90 tablets with 3 refills. If patient is to take 2 tablets and would like to send for 1 year supply can quantity be changed to 180 tablets with 3 refills?    Jennifer ALCANTARAN, RN  [facility]

## 2024-01-16 NOTE — TELEPHONE ENCOUNTER
Pt needs both sent to new pharmacy. One pharmacy told them to contact the other pharmacy and that pharmacy told them to contact the other. He would just like refills sent by  to [facility].

## 2024-01-17 RX ORDER — METOPROLOL SUCCINATE 25 MG/1
50 TABLET, EXTENDED RELEASE ORAL DAILY
Qty: 180 TABLET | Refills: 3 | Status: SHIPPED | OUTPATIENT
Start: 2024-01-17

## 2024-01-29 NOTE — PROGRESS NOTES
~Cardiology Clinic Visit~    Dom Durham is a pleasant 58 year old patient with past medical history significant for:     New paroxysmal atrial fibrillation with RVR  [id number] Score 1 (hypertension  Cardiomyopathy  Hypertension  Obesity  EtOH dependance   Hyperlipidemia  Hypothyroidism  Untreated sleep apnea     The patient was admitted to [facility] on 9/14/2023 as a transfer from [facility] with weeping ulcers of his legs.  He was found to be in atrial fibrillation with RVR.  The patient's wife had passed away in July and he had been drinking more alcohol than usual.  Several days prior to admission he noticed worsening dyspnea on exertion as well as orthopnea.  He was placed on diltiazem infusion as well as metoprolol 25 mg twice daily and a heparin drip.     He ultimately underwent a JUANY guided cardioversion.  His echocardiogram while in AF with RVR showed EF of 30 to 35% with mild dilatation of the LV.  He was placed on GDMT with lisinopril 2.5 mg daily, metoprolol XL 12.5 mg twice daily and on anticoagulation with apixaban 5 mg twice daily.     In follow-up, his EKG unfortunately revealed atrial fibrillation with a rate of 196 bpm.  He was unaware that he was in A-fib with RVR.  A repeat echocardiogram on 10/18/2023 that noted his LVEF had improved to 50 to 55%.  At that time, he had not taken his cardiac medications since he ran out approximately on 10/15/2023.       Given elevated rates, he was recommended repeat cardioversion.  Unfortunately, he was not on uninterrupted anticoagulation.  He was also unsure if he would be able to secure transportation to [facility] for JUANY/DCCV.  Again, he was asymptomatic to the arrhythmia.     With this, his Eliquis was resumed, Metoprolol increased to 50 mg daily.  When he was seen last in November, he was doing well and back in sinus rhythm, confirmed on EKG.  His blood pressure was somewhat elevated so we resumed low-dose lisinopril.  Routine labs following this  medication change are stable.     Diagnotic studies:  EKG (10/24/2023): AF with RVR  Echocardiogram (10/18/2023): LVEF of 50 to 55% without wall motion or valvular abnormalities.  Event monitor (9/15/2023): Sinus rhythm throughout with brief runs of SVT (less than 10 seconds)  Echocardiogram (9/14/2023): LVEF 30-35%  11/21/23 BMP (after restarting lisinopril): Cr 1.12, GFR 75, Na 139, K 5.0  EKG (11/7/2023 & 1/30/2024): Sinus rhythm     Interval 01/30/24    Today, patient is doing well without any new cardiac complaints.  He has maintained sinus rhythm on EKG.  Since I last saw him, he has gotten  -he was congratulated for this exciting news.  His blood pressure is somewhat improved today in clinic.  He says he has readings at home that average systolic 130s over diastolic 80s.  He notes he has gained some weight recently due to his recent marriage and eating more food with his wife.  He is hoping to improve diet in the upcoming months.  He does remain extremely active with outdoor activities.  __________________________________________________________________          Assessment and Impression:     Atrial fibrillation, new diagnosis (asymptomatic)  [id number] score of 1 anticoagulated on apixaban 5 mg twice daily.    Anticoagulation resumed 10/24/23.  EKG today notes NSR  On Metoprolol XL 50 mg daily.     Cardiomyopathy  Etiology: Likely tachycardia mediated  LVEF 30 to 35% on 9/14/2023 and improved to 50 to 55% on 10/18/2023  Stopped GDMT 2/2 running out (10/15/2023.)  Resumed Toprol XL 50 mg daily.  Others on hold to allow for arrhythmia management.  Discharge weight on 9/15/2023 was 217 pounds and weight today is 223 pounds     Hypertension  Intermittent control; on Lisinopril.          Recommendations and Plan:      Continue with current medications today, no changes.  Counseled on importance of DASH diet, daily exercise, and weight management for ongoing BP optimization and cardiac risk  reduction.  Continue to monitor BP at home and track trends.  Patient notify clinic if he receives trending values in the 140s over 90s.  At that point we can easily increase his lisinopril and follow labs closely.  Follow-up with Cardiology MD (needs to establish) in 1-year for annual visit.  __________________________________________________________________    Thank you for the opportunity to participate in this pleasant patient's care.    We would be happy to see this patient sooner for any concerns in the meantime.    KASIE Luther, CNP   Nurse Practitioner  [facility] [place] [facility]    Today's clinic visit entailed:  Review of the result(s) of each unique test - cardiac testing and imaging, EKG, labs  The following tests were independently interpreted by me as noted in my documentation: ekg  Prescription drug management    The level of medical decision making during this visit was of moderate complexity.    Orders this Visit:  Orders Placed This Encounter   Procedures    Follow-Up with Cardiology    EKG 12-lead complete w/read - Clinics (performed today)     Orders Placed This Encounter   Medications    lisinopril (ZESTRIL) 2.5 MG tablet     Sig: Take 1 tablet (2.5 mg) by mouth daily     Dispense:  90 tablet     Refill:  4    apixaban ANTICOAGULANT (ELIQUIS) 5 MG tablet     Sig: Take 1 tablet (5 mg) by mouth 2 times daily     Dispense:  180 tablet     Refill:  4     Medications Discontinued During This Encounter   Medication Reason    apixaban ANTICOAGULANT (ELIQUIS) 5 MG tablet Reorder (No AVS)    lisinopril (ZESTRIL) 2.5 MG tablet Reorder (No AVS)     Encounter Diagnoses   Name Primary?    Cardiomyopathy, unspecified type (H)     Atrial fibrillation with RVR (H)     New onset a-fib (H)     Benign essential hypertension Yes     CURRENT MEDICATIONS:  Current Outpatient Medications   Medication Sig Dispense Refill    apixaban ANTICOAGULANT (ELIQUIS) 5 MG tablet Take 1 tablet (5 mg) by mouth 2  "times daily 180 tablet 4    levothyroxine (SYNTHROID/LEVOTHROID) 88 MCG tablet Take 1 tablet (88 mcg) by mouth daily 90 tablet 4    lisinopril (ZESTRIL) 2.5 MG tablet Take 1 tablet (2.5 mg) by mouth daily 90 tablet 4    metoprolol succinate ER (TOPROL XL) 25 MG 24 hr tablet Take 2 tablets (50 mg) by mouth daily 180 tablet 3    tobramycin (TOBREX) 0.3 % ophthalmic solution Place 1-2 drops into both eyes every 2 hours (Patient not taking: Reported on 10/24/2023) 5 mL 0     ALLERGIES     Allergies   Allergen Reactions    Amoxicillin-Pot Clavulanate Other (See Comments)     \"I was so weak and just felt like dying\"    Dilaudid [Hydromorphone] Nausea and Vomiting     \"ok with Percocet\"    Hydrocodone Nausea     \"also did not do anything for my pain\"     PAST MEDICAL, SURGICAL, FAMILY, SOCIAL HISTORY:  History was reviewed and updated as needed, see medical record.    Review of Systems:  A 10-point Review Of Systems is otherwise normal except for that which is noted in the HPI and interval summary.    Physical Exam:    Vitals: BP (!) 138/90 (BP Location: Right arm, Cuff Size: Adult Regular)   Pulse 62   Ht 1.803 m (5' 11\")   Wt 107 kg (236 lb)   SpO2 98%   BMI 32.92 kg/m    Constitutional: Appears stated age, well nourished, NAD.  Neck: Supple. Carotid pulses full and equal.   Respiratory: Non-labored. Lungs CTAB.  Cardiovascular: RRR, normal S1 and S2. No M/G/R.  No edema.  GI: Soft, non-distended, non-tender.  Skin: Warm and dry.   Musculoskeletal/Extremities: Symmetrical movement.  Neurologic: No gross focal deficits. Alert, awake.  Psychiatric: Affect appropriate. Mentation normal.    Recent Lab Results:  LIPID RESULTS:  Lab Results   Component Value Date    CHOL 198 01/23/2023    CHOL 195 08/24/2015    HDL 47 01/23/2023    HDL 40 (L) 08/24/2015     (H) 01/23/2023     (H) 08/24/2015    TRIG 90 01/23/2023    TRIG 65 08/24/2015    CHOLHDLRATIO 4.9 08/24/2015     LIVER ENZYME RESULTS:  Lab Results " "  Component Value Date    AST 60 (H) 09/15/2023    AST 44 10/09/2017    ALT 64 09/15/2023    ALT 60 10/09/2017     CBC RESULTS:  Lab Results   Component Value Date    WBC 8.6 09/14/2023    WBC 7.5 10/05/2017    RBC 4.96 09/14/2023    RBC 5.17 10/05/2017    HGB 16.5 09/14/2023    HGB 16.2 10/05/2017    HCT 48.6 09/14/2023    HCT 49.5 10/05/2017    MCV 98 09/14/2023    MCV 96 10/05/2017    MCH 33.3 (H) 09/14/2023    MCH 31.3 10/05/2017    MCHC 34.0 09/14/2023    MCHC 32.7 10/05/2017    RDW 12.9 09/14/2023    RDW 12.7 10/05/2017     09/14/2023     10/05/2017     BMP RESULTS:  Lab Results   Component Value Date     11/21/2023     10/09/2017    POTASSIUM 5.0 11/21/2023    POTASSIUM 4.4 10/08/2021    POTASSIUM 4.0 10/09/2017    CHLORIDE 103 11/21/2023    CHLORIDE 105 10/08/2021    CHLORIDE 105 10/09/2017    CO2 23 11/21/2023    CO2 25 10/08/2021    CO2 26 10/09/2017    ANIONGAP 13 11/21/2023    ANIONGAP 6 10/08/2021    ANIONGAP 8 10/09/2017     (H) 11/21/2023     (H) 10/08/2021    GLC 90 10/09/2017    BUN 20.1 11/21/2023    BUN 18 10/08/2021    BUN 11 10/09/2017    CR 1.12 11/21/2023    CR 0.85 10/09/2017    GFRESTIMATED 75 11/21/2023    GFRESTIMATED >90 10/09/2017    GFRESTBLACK >90 10/09/2017    SHELIA 9.8 11/21/2023    SHELIA 9.5 10/09/2017      A1C RESULTS:  No results found for: \"A1C\"  INR RESULTS:  Lab Results   Component Value Date    INR 1.30 (H) 09/14/2023                     "

## 2024-01-30 ENCOUNTER — OFFICE VISIT (OUTPATIENT)
Dept: CARDIOLOGY | Facility: CLINIC | Age: 59
End: 2024-01-30
Payer: COMMERCIAL

## 2024-01-30 VITALS
HEART RATE: 62 BPM | SYSTOLIC BLOOD PRESSURE: 138 MMHG | DIASTOLIC BLOOD PRESSURE: 90 MMHG | WEIGHT: 236 LBS | BODY MASS INDEX: 33.04 KG/M2 | OXYGEN SATURATION: 98 % | HEIGHT: 71 IN

## 2024-01-30 DIAGNOSIS — I48.91 ATRIAL FIBRILLATION WITH RVR (H): ICD-10-CM

## 2024-01-30 DIAGNOSIS — I48.91 NEW ONSET A-FIB (H): ICD-10-CM

## 2024-01-30 DIAGNOSIS — I42.9 CARDIOMYOPATHY, UNSPECIFIED TYPE (H): ICD-10-CM

## 2024-01-30 DIAGNOSIS — I10 BENIGN ESSENTIAL HYPERTENSION: Primary | ICD-10-CM

## 2024-01-30 PROCEDURE — 93000 ELECTROCARDIOGRAM COMPLETE: CPT | Performed by: NURSE PRACTITIONER

## 2024-01-30 PROCEDURE — 99214 OFFICE O/P EST MOD 30 MIN: CPT | Performed by: NURSE PRACTITIONER

## 2024-01-30 RX ORDER — LISINOPRIL 2.5 MG/1
2.5 TABLET ORAL DAILY
Qty: 90 TABLET | Refills: 4 | Status: SHIPPED | OUTPATIENT
Start: 2024-01-30

## 2024-01-30 ASSESSMENT — PAIN SCALES - GENERAL: PAINLEVEL: NO PAIN (0)

## 2024-01-30 NOTE — PATIENT INSTRUCTIONS
Thank you for your visit with the [facility] today.    Today's Summary     Continue taking your current medications without changes today.  Check blood pressure at home and keep a log.  Aim for less than 130/80.  Work on a low salt diet, daily activity and weight control to continue to improve the blood pressure.  Follow up in 1-year with a Cardiology MD for routine follow up.  Please call 6-months in advance to schedule your appointment.    If you have questions or concerns, please do not hesitate to call my nursing support team at [phone].    Scheduling phone number: [phone]    It was a pleasure seeing you today.     KASIE Luther, CNP  Nurse Practitioner  [facility]  January 30, 2024  ________________________________________________________

## 2024-01-30 NOTE — LETTER
1/30/2024    Aftab Guevara PA-C  [address] [address]    RE: Dom Durham       Dear Colleague,     I had the pleasure of seeing Dom Durham in the [facility].              ~Cardiology Clinic Visit~    Dom Durham is a pleasant 58 year old patient with past medical history significant for:     New paroxysmal atrial fibrillation with RVR  [id number] Score 1 (hypertension  Cardiomyopathy  Hypertension  Obesity  EtOH dependance   Hyperlipidemia  Hypothyroidism  Untreated sleep apnea     The patient was admitted to [facility] on 9/14/2023 as a transfer from [facility] with weeping ulcers of his legs.  He was found to be in atrial fibrillation with RVR.  The patient's wife had passed away in July and he had been drinking more alcohol than usual.  Several days prior to admission he noticed worsening dyspnea on exertion as well as orthopnea.  He was placed on diltiazem infusion as well as metoprolol 25 mg twice daily and a heparin drip.     He ultimately underwent a JUANY guided cardioversion.  His echocardiogram while in AF with RVR showed EF of 30 to 35% with mild dilatation of the LV.  He was placed on GDMT with lisinopril 2.5 mg daily, metoprolol XL 12.5 mg twice daily and on anticoagulation with apixaban 5 mg twice daily.     In follow-up, his EKG unfortunately revealed atrial fibrillation with a rate of 196 bpm.  He was unaware that he was in A-fib with RVR.  A repeat echocardiogram on 10/18/2023 that noted his LVEF had improved to 50 to 55%.  At that time, he had not taken his cardiac medications since he ran out approximately on 10/15/2023.       Given elevated rates, he was recommended repeat cardioversion.  Unfortunately, he was not on uninterrupted anticoagulation.  He was also unsure if he would be able to secure transportation to [facility] for UJANY/DCCV.  Again, he was asymptomatic to the arrhythmia.     With this, his Eliquis was resumed, Metoprolol increased to 50 mg  daily.  When he was seen last in November, he was doing well and back in sinus rhythm, confirmed on EKG.  His blood pressure was somewhat elevated so we resumed low-dose lisinopril.  Routine labs following this medication change are stable.     Diagnotic studies:  EKG (10/24/2023): AF with RVR  Echocardiogram (10/18/2023): LVEF of 50 to 55% without wall motion or valvular abnormalities.  Event monitor (9/15/2023): Sinus rhythm throughout with brief runs of SVT (less than 10 seconds)  Echocardiogram (9/14/2023): LVEF 30-35%  11/21/23 BMP (after restarting lisinopril): Cr 1.12, GFR 75, Na 139, K 5.0  EKG (11/7/2023 & 1/30/2024): Sinus rhythm     Interval 01/30/24    Today, patient is doing well without any new cardiac complaints.  He has maintained sinus rhythm on EKG.  Since I last saw him, he has gotten  -he was congratulated for this exciting news.  His blood pressure is somewhat improved today in clinic.  He says he has readings at home that average systolic 130s over diastolic 80s.  He notes he has gained some weight recently due to his recent marriage and eating more food with his wife.  He is hoping to improve diet in the upcoming months.  He does remain extremely active with outdoor activities.  __________________________________________________________________          Assessment and Impression:     Atrial fibrillation, new diagnosis (asymptomatic)  [id number] score of 1 anticoagulated on apixaban 5 mg twice daily.    Anticoagulation resumed 10/24/23.  EKG today notes NSR  On Metoprolol XL 50 mg daily.     Cardiomyopathy  Etiology: Likely tachycardia mediated  LVEF 30 to 35% on 9/14/2023 and improved to 50 to 55% on 10/18/2023  Stopped GDMT 2/2 running out (10/15/2023.)  Resumed Toprol XL 50 mg daily.  Others on hold to allow for arrhythmia management.  Discharge weight on 9/15/2023 was 217 pounds and weight today is 223 pounds     Hypertension  Intermittent control; on Lisinopril.           Recommendations and Plan:      Continue with current medications today, no changes.  Counseled on importance of DASH diet, daily exercise, and weight management for ongoing BP optimization and cardiac risk reduction.  Continue to monitor BP at home and track trends.  Patient notify clinic if he receives trending values in the 140s over 90s.  At that point we can easily increase his lisinopril and follow labs closely.  Follow-up with Cardiology MD (needs to establish) in 1-year for annual visit.  __________________________________________________________________    Thank you for the opportunity to participate in this pleasant patient's care.    We would be happy to see this patient sooner for any concerns in the meantime.    KASIE Luther, CNP   Nurse Practitioner  [facility]    Today's clinic visit entailed:  Review of the result(s) of each unique test - cardiac testing and imaging, EKG, labs  The following tests were independently interpreted by me as noted in my documentation: ekg  Prescription drug management    The level of medical decision making during this visit was of moderate complexity.    Orders this Visit:  Orders Placed This Encounter   Procedures    Follow-Up with Cardiology    EKG 12-lead complete w/read - Clinics (performed today)     Orders Placed This Encounter   Medications    lisinopril (ZESTRIL) 2.5 MG tablet     Sig: Take 1 tablet (2.5 mg) by mouth daily     Dispense:  90 tablet     Refill:  4    apixaban ANTICOAGULANT (ELIQUIS) 5 MG tablet     Sig: Take 1 tablet (5 mg) by mouth 2 times daily     Dispense:  180 tablet     Refill:  4     Medications Discontinued During This Encounter   Medication Reason    apixaban ANTICOAGULANT (ELIQUIS) 5 MG tablet Reorder (No AVS)    lisinopril (ZESTRIL) 2.5 MG tablet Reorder (No AVS)     Encounter Diagnoses   Name Primary?    Cardiomyopathy, unspecified type (H)     Atrial fibrillation with RVR (H)     New onset a-fib (H)      "Benign essential hypertension Yes     CURRENT MEDICATIONS:  Current Outpatient Medications   Medication Sig Dispense Refill    apixaban ANTICOAGULANT (ELIQUIS) 5 MG tablet Take 1 tablet (5 mg) by mouth 2 times daily 180 tablet 4    levothyroxine (SYNTHROID/LEVOTHROID) 88 MCG tablet Take 1 tablet (88 mcg) by mouth daily 90 tablet 4    lisinopril (ZESTRIL) 2.5 MG tablet Take 1 tablet (2.5 mg) by mouth daily 90 tablet 4    metoprolol succinate ER (TOPROL XL) 25 MG 24 hr tablet Take 2 tablets (50 mg) by mouth daily 180 tablet 3    tobramycin (TOBREX) 0.3 % ophthalmic solution Place 1-2 drops into both eyes every 2 hours (Patient not taking: Reported on 10/24/2023) 5 mL 0     ALLERGIES     Allergies   Allergen Reactions    Amoxicillin-Pot Clavulanate Other (See Comments)     \"I was so weak and just felt like dying\"    Dilaudid [Hydromorphone] Nausea and Vomiting     \"ok with Percocet\"    Hydrocodone Nausea     \"also did not do anything for my pain\"     PAST MEDICAL, SURGICAL, FAMILY, SOCIAL HISTORY:  History was reviewed and updated as needed, see medical record.    Review of Systems:  A 10-point Review Of Systems is otherwise normal except for that which is noted in the HPI and interval summary.    Physical Exam:    Vitals: BP (!) 138/90 (BP Location: Right arm, Cuff Size: Adult Regular)   Pulse 62   Ht 1.803 m (5' 11\")   Wt 107 kg (236 lb)   SpO2 98%   BMI 32.92 kg/m    Constitutional: Appears stated age, well nourished, NAD.  Neck: Supple. Carotid pulses full and equal.   Respiratory: Non-labored. Lungs CTAB.  Cardiovascular: RRR, normal S1 and S2. No M/G/R.  No edema.  GI: Soft, non-distended, non-tender.  Skin: Warm and dry.   Musculoskeletal/Extremities: Symmetrical movement.  Neurologic: No gross focal deficits. Alert, awake.  Psychiatric: Affect appropriate. Mentation normal.    Recent Lab Results:  LIPID RESULTS:  Lab Results   Component Value Date    CHOL 198 01/23/2023    CHOL 195 08/24/2015    HDL 47 " "01/23/2023    HDL 40 (L) 08/24/2015     (H) 01/23/2023     (H) 08/24/2015    TRIG 90 01/23/2023    TRIG 65 08/24/2015    CHOLHDLRATIO 4.9 08/24/2015     LIVER ENZYME RESULTS:  Lab Results   Component Value Date    AST 60 (H) 09/15/2023    AST 44 10/09/2017    ALT 64 09/15/2023    ALT 60 10/09/2017     CBC RESULTS:  Lab Results   Component Value Date    WBC 8.6 09/14/2023    WBC 7.5 10/05/2017    RBC 4.96 09/14/2023    RBC 5.17 10/05/2017    HGB 16.5 09/14/2023    HGB 16.2 10/05/2017    HCT 48.6 09/14/2023    HCT 49.5 10/05/2017    MCV 98 09/14/2023    MCV 96 10/05/2017    MCH 33.3 (H) 09/14/2023    MCH 31.3 10/05/2017    MCHC 34.0 09/14/2023    MCHC 32.7 10/05/2017    RDW 12.9 09/14/2023    RDW 12.7 10/05/2017     09/14/2023     10/05/2017     BMP RESULTS:  Lab Results   Component Value Date     11/21/2023     10/09/2017    POTASSIUM 5.0 11/21/2023    POTASSIUM 4.4 10/08/2021    POTASSIUM 4.0 10/09/2017    CHLORIDE 103 11/21/2023    CHLORIDE 105 10/08/2021    CHLORIDE 105 10/09/2017    CO2 23 11/21/2023    CO2 25 10/08/2021    CO2 26 10/09/2017    ANIONGAP 13 11/21/2023    ANIONGAP 6 10/08/2021    ANIONGAP 8 10/09/2017     (H) 11/21/2023     (H) 10/08/2021    GLC 90 10/09/2017    BUN 20.1 11/21/2023    BUN 18 10/08/2021    BUN 11 10/09/2017    CR 1.12 11/21/2023    CR 0.85 10/09/2017    GFRESTIMATED 75 11/21/2023    GFRESTIMATED >90 10/09/2017    GFRESTBLACK >90 10/09/2017    SHLEIA 9.8 11/21/2023    SHELIA 9.5 10/09/2017      A1C RESULTS:  No results found for: \"A1C\"  INR RESULTS:  Lab Results   Component Value Date    INR 1.30 (H) 09/14/2023         Thank you for allowing me to participate in the care of your patient.      Sincerely,     KASIE Luther CNP     [facility]  cc:   KASIE Luther CNP  [address] [facility] [address]  [place] [address]      "

## 2024-02-26 DIAGNOSIS — E03.9 HYPOTHYROIDISM, UNSPECIFIED TYPE: ICD-10-CM

## 2024-02-26 RX ORDER — LEVOTHYROXINE SODIUM 88 UG/1
88 TABLET ORAL DAILY
Qty: 90 TABLET | Refills: 0 | Status: SHIPPED | OUTPATIENT
Start: 2024-02-26 | End: 2024-06-03

## 2024-03-08 ENCOUNTER — TELEPHONE (OUTPATIENT)
Dept: FAMILY MEDICINE | Facility: OTHER | Age: 59
End: 2024-03-08

## 2024-03-08 ENCOUNTER — OFFICE VISIT (OUTPATIENT)
Dept: FAMILY MEDICINE | Facility: OTHER | Age: 59
End: 2024-03-08
Payer: COMMERCIAL

## 2024-03-08 VITALS
BODY MASS INDEX: 33.32 KG/M2 | OXYGEN SATURATION: 97 % | TEMPERATURE: 97.7 F | RESPIRATION RATE: 18 BRPM | HEIGHT: 71 IN | DIASTOLIC BLOOD PRESSURE: 90 MMHG | SYSTOLIC BLOOD PRESSURE: 140 MMHG | WEIGHT: 238 LBS | HEART RATE: 74 BPM

## 2024-03-08 DIAGNOSIS — E03.9 HYPOTHYROIDISM, UNSPECIFIED TYPE: ICD-10-CM

## 2024-03-08 DIAGNOSIS — I10 ESSENTIAL HYPERTENSION: ICD-10-CM

## 2024-03-08 DIAGNOSIS — Z00.00 ROUTINE GENERAL MEDICAL EXAMINATION AT A HEALTH CARE FACILITY: Primary | ICD-10-CM

## 2024-03-08 DIAGNOSIS — E78.2 MIXED HYPERLIPIDEMIA: ICD-10-CM

## 2024-03-08 DIAGNOSIS — I50.9 CONGESTIVE HEART FAILURE, UNSPECIFIED HF CHRONICITY, UNSPECIFIED HEART FAILURE TYPE (H): ICD-10-CM

## 2024-03-08 LAB
ALBUMIN SERPL BCG-MCNC: 4.6 G/DL (ref 3.5–5.2)
ALP SERPL-CCNC: 76 U/L (ref 40–150)
ALT SERPL W P-5'-P-CCNC: 24 U/L (ref 0–70)
AST SERPL W P-5'-P-CCNC: 36 U/L (ref 0–45)
BILIRUB DIRECT SERPL-MCNC: <0.2 MG/DL (ref 0–0.3)
BILIRUB SERPL-MCNC: 0.5 MG/DL
CHOLEST SERPL-MCNC: 285 MG/DL
CREAT UR-MCNC: 205.2 MG/DL
FASTING STATUS PATIENT QL REPORTED: YES
HDLC SERPL-MCNC: 47 MG/DL
LDLC SERPL CALC-MCNC: 185 MG/DL
MICROALBUMIN UR-MCNC: <12 MG/L
MICROALBUMIN/CREAT UR: NORMAL MG/G{CREAT}
NONHDLC SERPL-MCNC: 238 MG/DL
PROT SERPL-MCNC: 7.5 G/DL (ref 6.4–8.3)
TRIGL SERPL-MCNC: 265 MG/DL
TSH SERPL DL<=0.005 MIU/L-ACNC: 3.79 UIU/ML (ref 0.3–4.2)

## 2024-03-08 PROCEDURE — 82043 UR ALBUMIN QUANTITATIVE: CPT | Performed by: PHYSICIAN ASSISTANT

## 2024-03-08 PROCEDURE — 99214 OFFICE O/P EST MOD 30 MIN: CPT | Mod: 25 | Performed by: PHYSICIAN ASSISTANT

## 2024-03-08 PROCEDURE — 80076 HEPATIC FUNCTION PANEL: CPT | Performed by: PHYSICIAN ASSISTANT

## 2024-03-08 PROCEDURE — 84443 ASSAY THYROID STIM HORMONE: CPT | Performed by: PHYSICIAN ASSISTANT

## 2024-03-08 PROCEDURE — 82570 ASSAY OF URINE CREATININE: CPT | Performed by: PHYSICIAN ASSISTANT

## 2024-03-08 PROCEDURE — 36415 COLL VENOUS BLD VENIPUNCTURE: CPT | Performed by: PHYSICIAN ASSISTANT

## 2024-03-08 PROCEDURE — 80061 LIPID PANEL: CPT | Performed by: PHYSICIAN ASSISTANT

## 2024-03-08 PROCEDURE — 99396 PREV VISIT EST AGE 40-64: CPT | Performed by: PHYSICIAN ASSISTANT

## 2024-03-08 SDOH — HEALTH STABILITY: PHYSICAL HEALTH: ON AVERAGE, HOW MANY DAYS PER WEEK DO YOU ENGAGE IN MODERATE TO STRENUOUS EXERCISE (LIKE A BRISK WALK)?: 7 DAYS

## 2024-03-08 SDOH — HEALTH STABILITY: PHYSICAL HEALTH: ON AVERAGE, HOW MANY MINUTES DO YOU ENGAGE IN EXERCISE AT THIS LEVEL?: 20 MIN

## 2024-03-08 ASSESSMENT — SOCIAL DETERMINANTS OF HEALTH (SDOH): HOW OFTEN DO YOU GET TOGETHER WITH FRIENDS OR RELATIVES?: PATIENT DECLINED

## 2024-03-08 NOTE — PATIENT INSTRUCTIONS
Tdap (tetanus) - every 10 years. Last received in 03/2013    RSV (respiratory syncytial virus) - brand new this year (protects lungs)    Hepatitis A (liver virus - fecal - oral) - 2 part series     Pneumococcal vaccine (PCV20)  - protects against pneumonia - good for 5 years   Preventive Care Advice   This is general advice given by our system to help you stay healthy. However, your care team may have specific advice just for you. Please talk to your care team about your preventive care needs.  Nutrition  Eat 5 or more servings of fruits and vegetables each day.  Try wheat bread, brown rice and whole grain pasta (instead of white bread, rice, and pasta).  Get enough calcium and vitamin D. Check the label on foods and aim for 100% of the RDA (recommended daily allowance).  Lifestyle  Exercise at least 150 minutes each week   (30 minutes a day, 5 days a week).  Do muscle strengthening activities 2 days a week. These help control your weight and prevent disease.  No smoking.  Wear sunscreen to prevent skin cancer.  Have a dental exam and cleaning every 6 months.  Yearly exams  See your health care team every year to talk about:  Any changes in your health.  Any medicines your care team has prescribed.  Preventive care, family planning, and ways to prevent chronic diseases.  Shots (vaccines)   HPV shots (up to age 26), if you've never had them before.  Hepatitis B shots (up to age 59), if you've never had them before.  COVID-19 shot: Get this shot when it's due.  Flu shot: Get a flu shot every year.  Tetanus shot: Get a tetanus shot every 10 years.  Pneumococcal, hepatitis A, and RSV shots: Ask your care team if you need these based on your risk.  Shingles shot (for age 50 and up).  General health tests  Diabetes screening:  Starting at age 35, Get screened for diabetes at least every 3 years.  If you are younger than age 35, ask your care team if you should be screened for diabetes.  Cholesterol test: At age 39, start  having a cholesterol test every 5 years, or more often if advised.  Bone density scan (DEXA): At age 50, ask your care team if you should have this scan for osteoporosis (brittle bones).  Hepatitis C: Get tested at least once in your life.  STIs (sexually transmitted infections)  Before age 24: Ask your care team if you should be screened for STIs.  After age 24: Get screened for STIs if you're at risk. You are at risk for STIs (including HIV) if:  You are sexually active with more than one person.  You don't use condoms every time.  You or a partner was diagnosed with a sexually transmitted infection.  If you are at risk for HIV, ask about PrEP medicine to prevent HIV.  Get tested for HIV at least once in your life, whether you are at risk for HIV or not.  Cancer screening tests  Cervical cancer screening: If you have a cervix, begin getting regular cervical cancer screening tests at age 21. Most people who have regular screenings with normal results can stop after age 65. Talk about this with your provider.  Breast cancer scan (mammogram): If you've ever had breasts, begin having regular mammograms starting at age 40. This is a scan to check for breast cancer.  Colon cancer screening: It is important to start screening for colon cancer at age 45.  Have a colonoscopy test every 10 years (or more often if you're at risk) Or, ask your provider about stool tests like a FIT test every year or Cologuard test every 3 years.  To learn more about your testing options, visit: https://www.[employer]/901316.pdf.  For help making a decision, visit: https://bit.ly/sh74416.  Prostate cancer screening test: If you have a prostate and are age 55 to 69, ask your provider if you would benefit from a yearly prostate cancer screening test.  Lung cancer screening: If you are a current or former smoker age 50 to 80, ask your care team if ongoing lung cancer screenings are right for you.  For informational purposes only. Not to replace  the advice of your health care provider. Copyright   2023 [facility]. All rights reserved. Clinically reviewed by the [facility]itions Program. [employer] [id number] - REV 01/24.

## 2024-03-08 NOTE — TELEPHONE ENCOUNTER
----- Message from Aftab Guevara PA-C sent at 3/8/2024  4:42 PM CST -----  Patient left before we were able to recheck BP today. Please help him get on the FLOAT schedule for a BP recheck in the next 1-2 weeks.     Thanks,  Aftab Guevara PA-C on 3/8/2024 at 4:42 PM

## 2024-03-08 NOTE — PROGRESS NOTES
Preventive Care Visit  [facility]  Aftab Guevara PA-C, Family Medicine  Mar 8, 2024    Assessment & Plan     Routine general medical examination at a health care facility  Patient is reports good general health. He is following with cardiologist who is tracking BP. This is elevated today and patient will be instructed on return for BP check with FLOAT nurse. He is recently , Jan 2024, and they have moved in together. His spouse is from [place] and enjoys making meals from scratch. Patient says that this has resulted in his eating more vegetables and tofu. He has had to be careful about intake of sodium through soy sauce and similar cooking ingredients. Reviewed healthy lifestyle recommendations with the patient. Reviewed health maintenance and updated per the patient's preferences.  - Albumin Random Urine Quantitative with Creat Ratio; Future  - Hepatic panel (Albumin, ALT, AST, Bili, Alk Phos, TP); Future  - TSH with free T4 reflex; Future  - Albumin Random Urine Quantitative with Creat Ratio  - Hepatic panel (Albumin, ALT, AST, Bili, Alk Phos, TP)  - TSH with free T4 reflex    Essential hypertension  BP is 140/90 today. Patient will return for FLOAT BP check in 1-2 weeks. If BP remains elevated I will have him increase his lisinopril dose.     Hypothyroidism, unspecified type  Updating labs today. Has been taking medication as directed without adverse effect. Denies symptoms at this time.   - TSH with free T4 reflex; Future  - TSH with free T4 reflex    Mixed hyperlipidemia  The 10-year ASCVD risk score (Margot LEMUS, et al., 2019) is: 13.1%    Values used to calculate the score:      Age: 59 years      Sex: Male      Is Non- : No      Diabetic: No      Tobacco smoker: No      Systolic Blood Pressure: 140 mmHg      Is BP treated: Yes      HDL Cholesterol: 47 mg/dL      Total Cholesterol: 198 mg/dL  Updating lipids today, will re-evaluate the ASCVD risk. If it  remains grossly unchanged then will recommend use of statin for the patient.   - Lipid panel reflex to direct LDL Non-fasting; Future  - Hepatic panel (Albumin, ALT, AST, Bili, Alk Phos, TP); Future  - Lipid panel reflex to direct LDL Non-fasting  - Hepatic panel (Albumin, ALT, AST, Bili, Alk Phos, TP)    Counseling  Appropriate preventive services were discussed with this patient, including applicable screening as appropriate for fall prevention, nutrition, physical activity, Tobacco-use cessation, weight loss and cognition.  Checklist reviewing preventive services available has been given to the patient.  Reviewed patient's diet, addressing concerns and/or questions.   The patient was instructed to see the dentist every 6 months.     Prashanth Kimball is a 59 year old, presenting for the following:  Physical        3/8/2024     6:56 AM   Additional Questions   Roomed by Amrita GOLDEN   Accompanied by self        Health Care Directive  Patient does not have a Health Care Directive or Living Will: Discussed advance care planning with patient; however, patient declined at this time.    HPI          3/8/2024   General Health   How would you rate your overall physical health? Good   Feel stress (tense, anxious, or unable to sleep) Patient declined         3/8/2024   Nutrition   Three or more servings of calcium each day? (!) I DON'T KNOW   Diet: Regular (no restrictions)    Low salt   How many servings of fruit and vegetables per day? (!) 2-3   How many sweetened beverages each day? 0-1         3/8/2024   Exercise   Days per week of moderate/strenous exercise 7 days   Average minutes spent exercising at this level 20 min         3/8/2024   Social Factors   Frequency of gathering with friends or relatives Patient declined   Worry food won't last until get money to buy more Patient declined   Food not last or not have enough money for food? Patient declined   Do you have housing?  Patient declined   Are you worried about losing  "your housing? Patient declined   Lack of transportation? Patient declined   Unable to get utilities (heat,electricity)? Patient declined          No data to display                   3/8/2024   Dental   Dentist two times every year? (!) NO         3/8/2024   TB Screening   Were you born outside of [place]?  (!) DECLINE         3/8/2024   Substance Use   Alcohol more than 3/day or more than 7/wk Not Applicable   Do you use any other substances recreationally? (!) DECLINE     Social History     Tobacco Use    Smoking status: Never    Smokeless tobacco: Never   Vaping Use    Vaping Use: Never used   Substance Use Topics    Alcohol use: Yes     Alcohol/week: 6.0 standard drinks of alcohol     Types: 6 Shots of liquor per week    Drug use: No         3/8/2024   STI Screening   New sexual partner(s) since last STI/HIV test? (!) DECLINE   Last PSA: No results found for: \"PSA\"  ASCVD Risk   The 10-year ASCVD risk score (Margot LEMUS, et al., 2019) is: 13.1%    Values used to calculate the score:      Age: 59 years      Sex: Male      Is Non- : No      Diabetic: No      Tobacco smoker: No      Systolic Blood Pressure: 140 mmHg      Is BP treated: Yes      HDL Cholesterol: 47 mg/dL      Total Cholesterol: 198 mg/dL    Reviewed and updated as needed this visit by Provider        Review of Systems  Constitutional, HEENT, cardiovascular, pulmonary, gi and gu systems are negative, except as otherwise noted.     Objective    Exam  BP (!) 140/90   Pulse 74   Temp 97.7  F (36.5  C) (Temporal)   Resp 18   Ht 1.803 m (5' 11\")   Wt 108 kg (238 lb)   SpO2 97%   BMI 33.19 kg/m     Estimated body mass index is 33.19 kg/m  as calculated from the following:    Height as of this encounter: 1.803 m (5' 11\").    Weight as of this encounter: 108 kg (238 lb).    Physical Exam  GENERAL: alert and no distress  EYES: Eyes grossly normal to inspection, PERRL and conjunctivae and sclerae normal  HENT: ear canals and " TM's normal, nose and mouth without ulcers or lesions  NECK: no adenopathy, no asymmetry, masses, or scars  RESP: lungs clear to auscultation - no rales, rhonchi or wheezes  CV: regular rate and rhythm, normal S1 S2, no S3 or S4, no murmur, click or rub, no peripheral edema  ABDOMEN: soft, nontender, no hepatosplenomegaly, no masses and bowel sounds normal  MS: no gross musculoskeletal defects noted, no edema  PSYCH: mentation appears normal, affect normal/bright      Signed Electronically by: Aftab Guevara PA-C

## 2024-03-08 NOTE — TELEPHONE ENCOUNTER
I called patient and relayed this message, he says that he has a BP machine at home and can recheck that way and let us know what the result is if you're ok with that? I told him I'd ask you and that if it still reads high you will still want him to have an MA visit but please let us know if you're ok with him doing it that way. He also said he'll try to get into [facility] to read your message about his lab results.

## 2024-03-08 NOTE — RESULT ENCOUNTER NOTE
Patient left before we were able to recheck BP today. Please help him get on the FLOAT schedule for a BP recheck in the next 1-2 weeks.     Thanks,  Aftab Guevara PA-C on 3/8/2024 at 4:42 PM

## 2024-03-11 ENCOUNTER — PATIENT OUTREACH (OUTPATIENT)
Dept: GASTROENTEROLOGY | Facility: CLINIC | Age: 59
End: 2024-03-11
Payer: COMMERCIAL

## 2024-03-18 ENCOUNTER — TELEPHONE (OUTPATIENT)
Dept: FAMILY MEDICINE | Facility: OTHER | Age: 59
End: 2024-03-18
Payer: COMMERCIAL

## 2024-03-18 NOTE — TELEPHONE ENCOUNTER
Patient Quality Outreach    Patient is due for the following:   Hypertension -  BP check    Next Steps:   No follow up needed at this time. JR was messaging with patient and he checked his BP at home and JR said his readings were acceptable and ok to F/U at next office visit.    Type of outreach:    None needed.      Questions for provider review:    None           Amrita Frederick, CMA

## 2024-05-10 ENCOUNTER — TELEPHONE (OUTPATIENT)
Dept: FAMILY MEDICINE | Facility: OTHER | Age: 59
End: 2024-05-10
Payer: COMMERCIAL

## 2024-05-10 NOTE — TELEPHONE ENCOUNTER
Patient Quality Outreach    Patient is due for the following:   Hypertension -  BP check    Next Steps:   No follow up needed at this time. JR was messaging with patient and he checked his BP at home and JR said his readings were acceptable and ok to F/U at next office visit.    Type of outreach:    Chart review performed, no outreach needed.      Questions for provider review:    None           Amrita Frederick, CMA

## 2024-06-03 DIAGNOSIS — E03.9 HYPOTHYROIDISM, UNSPECIFIED TYPE: ICD-10-CM

## 2024-06-03 RX ORDER — LEVOTHYROXINE SODIUM 88 UG/1
88 TABLET ORAL DAILY
Qty: 90 TABLET | Refills: 2 | Status: SHIPPED | OUTPATIENT
Start: 2024-06-03

## 2024-06-05 ENCOUNTER — NURSE TRIAGE (OUTPATIENT)
Dept: FAMILY MEDICINE | Facility: OTHER | Age: 59
End: 2024-06-05
Payer: COMMERCIAL

## 2024-06-05 NOTE — TELEPHONE ENCOUNTER
Reason for Disposition    Swollen knee joint (no fever or redness)    MILD knee pain persists > 7 days    Additional Information    Negative: Sounds like a life-threatening emergency to the triager    Negative: Followed a knee injury    Negative: Swollen knee joint and fever    Negative: Can't move swollen joint at all    Negative: Thigh or calf pain and only 1 side and present > 1 hour    Negative: Thigh or calf swelling and only 1 side    Negative: SEVERE pain (e.g., excruciating, unable to walk)    Negative: Very swollen knee joint    Negative: Painful rash with multiple small blisters grouped together (i.e., dermatomal distribution or 'band' or 'stripe')    Negative: Looks like a boil, infected sore, deep ulcer, or other infected rash (spreading redness, pus)    Protocols used: Knee Pain-A-OH

## 2024-06-05 NOTE — CONFIDENTIAL NOTE
Running in the woods with stole toed boots on in February.    Since then constant knee pain and swelling.  Varies in pain  intensity.  Some days it seems better and then other days can hardly walk on it. Has been putting hot Himalayan salt on it at night but its not helping.    Had the knee symptoms at his physical a couple of weeks.  Provider commented about walking instead of running but patient didn't say more about the pain because he didn't want to be charged for it.     Triage states see in 3 days.  Okay to use same day appt?

## 2024-06-06 NOTE — TELEPHONE ENCOUNTER
Patient can use work in slot to be seen for his knee and elevated BP.     Thanks,  Aftab Guevara PA-C on 6/6/2024 at 7:03 AM

## 2024-06-07 ENCOUNTER — OFFICE VISIT (OUTPATIENT)
Dept: FAMILY MEDICINE | Facility: OTHER | Age: 59
End: 2024-06-07
Payer: COMMERCIAL

## 2024-06-07 VITALS
SYSTOLIC BLOOD PRESSURE: 118 MMHG | DIASTOLIC BLOOD PRESSURE: 80 MMHG | BODY MASS INDEX: 34.51 KG/M2 | OXYGEN SATURATION: 97 % | WEIGHT: 246.5 LBS | HEART RATE: 65 BPM | TEMPERATURE: 97.4 F | RESPIRATION RATE: 18 BRPM | HEIGHT: 71 IN

## 2024-06-07 DIAGNOSIS — I10 ESSENTIAL HYPERTENSION: ICD-10-CM

## 2024-06-07 DIAGNOSIS — M70.51 INFRAPATELLAR BURSITIS OF RIGHT KNEE: Primary | ICD-10-CM

## 2024-06-07 PROCEDURE — 99214 OFFICE O/P EST MOD 30 MIN: CPT | Performed by: PHYSICIAN ASSISTANT

## 2024-06-07 RX ORDER — PREDNISONE 20 MG/1
TABLET ORAL
Qty: 11 TABLET | Refills: 0 | Status: SHIPPED | OUTPATIENT
Start: 2024-06-07 | End: 2024-06-16

## 2024-06-07 ASSESSMENT — PAIN SCALES - PAIN ENJOYMENT GENERAL ACTIVITY SCALE (PEG)
AVG_PAIN_PASTWEEK: 10
AVG_PAIN_PASTWEEK: 10 - PAIN AS BAD AS YOU CAN IMAGINE
INTERFERED_GENERAL_ACTIVITY: 7
INTERFERED_ENJOYMENT_LIFE: 4
INTERFERED_ENJOYMENT_LIFE: 4
PEG_TOTALSCORE: 7
INTERFERED_GENERAL_ACTIVITY: 7
PEG_TOTALSCORE: 7

## 2024-06-07 ASSESSMENT — PATIENT HEALTH QUESTIONNAIRE - PHQ9
SUM OF ALL RESPONSES TO PHQ QUESTIONS 1-9: 0
SUM OF ALL RESPONSES TO PHQ QUESTIONS 1-9: 0
10. IF YOU CHECKED OFF ANY PROBLEMS, HOW DIFFICULT HAVE THESE PROBLEMS MADE IT FOR YOU TO DO YOUR WORK, TAKE CARE OF THINGS AT HOME, OR GET ALONG WITH OTHER PEOPLE: NOT DIFFICULT AT ALL

## 2024-06-07 ASSESSMENT — ANXIETY QUESTIONNAIRES
2. NOT BEING ABLE TO STOP OR CONTROL WORRYING: NOT AT ALL
IF YOU CHECKED OFF ANY PROBLEMS ON THIS QUESTIONNAIRE, HOW DIFFICULT HAVE THESE PROBLEMS MADE IT FOR YOU TO DO YOUR WORK, TAKE CARE OF THINGS AT HOME, OR GET ALONG WITH OTHER PEOPLE: NOT DIFFICULT AT ALL
7. FEELING AFRAID AS IF SOMETHING AWFUL MIGHT HAPPEN: NOT AT ALL
GAD7 TOTAL SCORE: 0
5. BEING SO RESTLESS THAT IT IS HARD TO SIT STILL: NOT AT ALL
6. BECOMING EASILY ANNOYED OR IRRITABLE: NOT AT ALL
1. FEELING NERVOUS, ANXIOUS, OR ON EDGE: NOT AT ALL
7. FEELING AFRAID AS IF SOMETHING AWFUL MIGHT HAPPEN: NOT AT ALL
4. TROUBLE RELAXING: NOT AT ALL
3. WORRYING TOO MUCH ABOUT DIFFERENT THINGS: NOT AT ALL
GAD7 TOTAL SCORE: 0
GAD7 TOTAL SCORE: 0
8. IF YOU CHECKED OFF ANY PROBLEMS, HOW DIFFICULT HAVE THESE MADE IT FOR YOU TO DO YOUR WORK, TAKE CARE OF THINGS AT HOME, OR GET ALONG WITH OTHER PEOPLE?: NOT DIFFICULT AT ALL

## 2024-06-07 NOTE — LETTER
My Heart Failure Action Plan  Name: Dom Durham   YOB: 1965  Date: 6/7/2024   My doctor:   Aftab Guevara [address]  [facility] [id number] [phone] My Diagnosis: HF-pEF (EF > 40%)  My Ejection Fraction:   Lab Results   Component Value Date    LVEF 50-55% 10/18/2023     My Exercise Goal: Start exercise slowly - to begin, do a few minutes of exercise, several times a day. Increase your time and speed [id number] to build tolerance, with a goal of 30 minutes of exercise daily. Steady, slow, and consistent exercise is both safe and healthy. Stop and rest when you feel tired or become short of breath. Do not push yourself on days when you don t feel well.       My Weight Plan:   Wt Readings from Last 2 Encounters:   06/07/24 111.8 kg (246 lb 8 oz)   03/08/24 108 kg (238 lb)     Weigh yourself daily using the same scale. If you gain more than 2 pounds in 24 hours or 5 pounds in 7 days. call the clinic    My Diet Goal: No added salt and 2,000 mg of sodium    Emergency Room Visits:    Our goal is to improve your quality of life and help you avoid a visit to the emergency room or hospital.  If we work together, we can achieve this goal. But, if you feel you need to call 911 or go to the emergency room, please do so.  If you go to the emergency room, please bring your list of medicines and your daily weight chart with you.    Each day ask yourself:  Is my weight up?  Do I have swelling?  Do I have trouble breathing?  How did I sleep?  Other problems?       GREEN ZONE     Weight gained is no more than 2 pounds a day or 5 pounds a in 7 days.  No swelling in feet, ankles, legs or stomach.  No more swelling than usual.  No more trouble breathing than usual.  No change in my sleep.  No other problems. What should I do?  I am doing fine. I will take my medicine, follow my diet, see my doctor, exercise, and watch for symptoms.            YELLOW ZONE         Weight gain of more than 2 pounds in one day or 5 pounds in 7 days.  New swelling in ankle, leg, knee or thigh.  Bloating in belly, pants feel tighter.  Swelling in hands or face.  Coughing or trouble breathing while walking or talking.  Harder to breathe last night.  Have trouble sleeping, wake up short of breath.  Unusually tired.  Not eating.  Nausea, vomiting, or diarrhea  Pain in my chest or bad  leg cramps.  Feel weak or dizzy. What should I do?  I need to take action and call my doctor or nurse today.                 RED ZONE         Weight gain of 5 pounds overnight.  Chest pain or pressure that does not go away.  Feel less alert.  Wheezing or have trouble breathing when at rest.  Cannot sleep lying down.  Cannot take my medicines.  Pass out or faint. What should I do?  I need to call my doctor or nurse now!  Call 911 if I have chest pain or cannot breathe.

## 2024-06-07 NOTE — PROGRESS NOTES
"  Assessment & Plan     Infrapatellar bursitis of right knee  Patient informs me that he has been running on the trails around his property. The footwear he chose to run in were steel toed boots. He began this exercise about 1-2 months ago. Over this time he has been experiencing increasing pains in the right inferomedial knee. With the pains he has noticed that the knee is more sensitive to flexion and will swell at times. Denies trip/fall or similar injury while running.     Today the knee is mildly edematous with tenderness to palpation along the region of the pes anserine bursa. Able to demonstrate normal AROM of the right knee and 5/5 strength to resisted flex/ext. No identified soft tissue injuries. Discussed suspicion of bursitis due to overuse. Reviewed benefit of applying ice packs, using soft brace and activity as tolerated. Patient was given home exercises/stretches, but was also instructed on meeting with PT. I did offer cortisone injection today, but he would prefer oral steroid taper. Reviewed possible adverse effects.   - predniSONE (DELTASONE) 20 MG tablet; Take 1 tablet (20 mg) by mouth 2 times daily for 3 days, THEN 1 tablet (20 mg) daily for 3 days, THEN 0.5 tablets (10 mg) daily for 3 days.  - Physical Therapy  Referral; Future    Essential hypertension  /80 today. This is excellent. No changes to medication, continue without change.       BMI  Estimated body mass index is 34.38 kg/m  as calculated from the following:    Height as of this encounter: 1.803 m (5' 11\").    Weight as of this encounter: 111.8 kg (246 lb 8 oz).   Weight management plan: Discussed healthy diet and exercise guidelines    Subjective   Jigar is a 59 year old, presenting for the following health issues:  Pain      6/7/2024     9:51 AM   Additional Questions   Roomed by Amrita GOLDEN   Accompanied by self     Pain    History of Present Illness       Reason for visit:  Knee pain - right  Symptom onset:  More than a " "month  Symptoms include:  Swelling and sharp pain  Symptom intensity:  Severe  Symptom progression:  Staying the same  Had these symptoms before:  Yes  Has tried/received treatment for these symptoms:  No  What makes it worse:  Walking  What makes it better:  Rest-sleeping    He eats 4 or more servings of fruits and vegetables daily.He consumes 1 sweetened beverage(s) daily.He exercises with enough effort to increase his heart rate 10 to 19 minutes per day.  He exercises with enough effort to increase his heart rate 7 days per week.   He is taking medications regularly.     Pain History:  When did you first notice your pain? 11 weeks   Have you seen this provider for your pain in the past? No   Where in your body do your have pain? Right knee  Are you seeing anyone else for your pain? No  What makes your pain better? Rest-sleep  What makes your pain worse? walking  How has pain affected your ability to work? Not applicable  Who lives in your household? Wife and himself        6/7/2024     9:48 AM   PHQ-9 SCORE   PHQ-9 Total Score MyChart 0   PHQ-9 Total Score 0           6/7/2024     9:48 AM   AMBAR-7 SCORE   Total Score 0 (minimal anxiety)   Total Score 0           6/7/2024     9:54 AM   PEG Score   PEG Total Score 7         Review of Systems  Constitutional, HEENT, cardiovascular, pulmonary, gi and gu systems are negative, except as otherwise noted.      Objective    /80   Pulse 65   Temp 97.4  F (36.3  C) (Temporal)   Resp 18   Ht 1.803 m (5' 11\")   Wt 111.8 kg (246 lb 8 oz)   SpO2 97%   BMI 34.38 kg/m    Body mass index is 34.38 kg/m .  Physical Exam   GENERAL: alert and no distress  NECK: no adenopathy, no asymmetry, masses, or scars  RESP: lungs clear to auscultation - no rales, rhonchi or wheezes  CV: regular rate and rhythm, normal S1 S2, no S3 or S4, no murmur, click or rub, no peripheral edema  MS: Normal AROM of right knee/ankle. 5/5 strength to resisted flex/ext of R knee. Neg varus/valgus, " ant drawer and mily. Tenderness to palpation along the inferomedial knee, roughly region of the pes anserine bursa. Mild edema noted around the knee joint. Skin temp to touch. No erythema.   PSYCH: mentation appears normal, affect normal/bright      Signed Electronically by: Aftab Guevara PA-C

## 2024-11-30 ENCOUNTER — HOSPITAL ENCOUNTER (EMERGENCY)
Facility: CLINIC | Age: 59
Discharge: HOME | End: 2024-12-01
Attending: FAMILY MEDICINE | Admitting: FAMILY MEDICINE
Payer: COMMERCIAL

## 2024-11-30 DIAGNOSIS — Z79.01 LONG TERM (CURRENT) USE OF ANTICOAGULANTS: ICD-10-CM

## 2024-11-30 DIAGNOSIS — I48.91 ATRIAL FIBRILLATION WITH RAPID VENTRICULAR RESPONSE (H): ICD-10-CM

## 2024-11-30 LAB
ALBUMIN SERPL BCG-MCNC: 4.4 G/DL (ref 3.5–5.2)
ALP SERPL-CCNC: 91 U/L (ref 40–150)
ALT SERPL W P-5'-P-CCNC: 16 U/L (ref 0–70)
ANION GAP SERPL CALCULATED.3IONS-SCNC: 13 MMOL/L (ref 7–15)
AST SERPL W P-5'-P-CCNC: 26 U/L (ref 0–45)
ATRIAL RATE - MUSE: 71 BPM
ATRIAL RATE - MUSE: NORMAL BPM
BASOPHILS # BLD AUTO: 0.1 10E3/UL (ref 0–0.2)
BASOPHILS NFR BLD AUTO: 1 %
BILIRUB SERPL-MCNC: 0.2 MG/DL
BUN SERPL-MCNC: 19.6 MG/DL (ref 8–23)
CALCIUM SERPL-MCNC: 9.8 MG/DL (ref 8.8–10.4)
CHLORIDE SERPL-SCNC: 102 MMOL/L (ref 98–107)
CREAT SERPL-MCNC: 1.05 MG/DL (ref 0.67–1.17)
DIASTOLIC BLOOD PRESSURE - MUSE: NORMAL MMHG
DIASTOLIC BLOOD PRESSURE - MUSE: NORMAL MMHG
EGFRCR SERPLBLD CKD-EPI 2021: 81 ML/MIN/1.73M2
EOSINOPHIL # BLD AUTO: 1 10E3/UL (ref 0–0.7)
EOSINOPHIL NFR BLD AUTO: 10 %
ERYTHROCYTE [DISTWIDTH] IN BLOOD BY AUTOMATED COUNT: 12.4 % (ref 10–15)
GLUCOSE SERPL-MCNC: 108 MG/DL (ref 70–99)
HCO3 SERPL-SCNC: 22 MMOL/L (ref 22–29)
HCT VFR BLD AUTO: 40.3 % (ref 40–53)
HGB BLD-MCNC: 13.6 G/DL (ref 13.3–17.7)
HOLD SPECIMEN: NORMAL
HOLD SPECIMEN: NORMAL
IMM GRANULOCYTES # BLD: 0 10E3/UL
IMM GRANULOCYTES NFR BLD: 0 %
INTERPRETATION ECG - MUSE: NORMAL
INTERPRETATION ECG - MUSE: NORMAL
LYMPHOCYTES # BLD AUTO: 3.4 10E3/UL (ref 0.8–5.3)
LYMPHOCYTES NFR BLD AUTO: 36 %
MCH RBC QN AUTO: 30.7 PG (ref 26.5–33)
MCHC RBC AUTO-ENTMCNC: 33.7 G/DL (ref 31.5–36.5)
MCV RBC AUTO: 91 FL (ref 78–100)
MONOCYTES # BLD AUTO: 0.8 10E3/UL (ref 0–1.3)
MONOCYTES NFR BLD AUTO: 9 %
NEUTROPHILS # BLD AUTO: 4.3 10E3/UL (ref 1.6–8.3)
NEUTROPHILS NFR BLD AUTO: 44 %
NRBC # BLD AUTO: 0 10E3/UL
NRBC BLD AUTO-RTO: 0 /100
P AXIS - MUSE: 41 DEGREES
P AXIS - MUSE: NORMAL DEGREES
PLATELET # BLD AUTO: 158 10E3/UL (ref 150–450)
POTASSIUM SERPL-SCNC: 4.1 MMOL/L (ref 3.4–5.3)
PR INTERVAL - MUSE: 146 MS
PR INTERVAL - MUSE: NORMAL MS
PROT SERPL-MCNC: 7.1 G/DL (ref 6.4–8.3)
QRS DURATION - MUSE: 98 MS
QRS DURATION - MUSE: 98 MS
QT - MUSE: 324 MS
QT - MUSE: 372 MS
QTC - MUSE: 404 MS
QTC - MUSE: 474 MS
R AXIS - MUSE: 32 DEGREES
R AXIS - MUSE: 35 DEGREES
RBC # BLD AUTO: 4.43 10E6/UL (ref 4.4–5.9)
SODIUM SERPL-SCNC: 137 MMOL/L (ref 135–145)
SYSTOLIC BLOOD PRESSURE - MUSE: NORMAL MMHG
SYSTOLIC BLOOD PRESSURE - MUSE: NORMAL MMHG
T AXIS - MUSE: 10 DEGREES
T AXIS - MUSE: 33 DEGREES
TROPONIN T SERPL HS-MCNC: 9 NG/L
VENTRICULAR RATE- MUSE: 129 BPM
VENTRICULAR RATE- MUSE: 71 BPM
WBC # BLD AUTO: 9.6 10E3/UL (ref 4–11)

## 2024-11-30 PROCEDURE — 999N000157 HC STATISTIC RCP TIME EA 10 MIN

## 2024-11-30 PROCEDURE — 93005 ELECTROCARDIOGRAM TRACING: CPT | Performed by: FAMILY MEDICINE

## 2024-11-30 PROCEDURE — 92960 CARDIOVERSION ELECTRIC EXT: CPT | Performed by: FAMILY MEDICINE

## 2024-11-30 PROCEDURE — 99285 EMERGENCY DEPT VISIT HI MDM: CPT | Mod: 25 | Performed by: FAMILY MEDICINE

## 2024-11-30 PROCEDURE — 250N000011 HC RX IP 250 OP 636: Performed by: FAMILY MEDICINE

## 2024-11-30 PROCEDURE — 36415 COLL VENOUS BLD VENIPUNCTURE: CPT | Performed by: FAMILY MEDICINE

## 2024-11-30 PROCEDURE — 84484 ASSAY OF TROPONIN QUANT: CPT | Performed by: FAMILY MEDICINE

## 2024-11-30 PROCEDURE — 84132 ASSAY OF SERUM POTASSIUM: CPT | Performed by: FAMILY MEDICINE

## 2024-11-30 PROCEDURE — 85025 COMPLETE CBC W/AUTO DIFF WBC: CPT | Performed by: FAMILY MEDICINE

## 2024-11-30 PROCEDURE — 93010 ELECTROCARDIOGRAM REPORT: CPT | Performed by: FAMILY MEDICINE

## 2024-11-30 PROCEDURE — 82565 ASSAY OF CREATININE: CPT | Performed by: FAMILY MEDICINE

## 2024-11-30 RX ORDER — FLUMAZENIL 0.1 MG/ML
0.2 INJECTION, SOLUTION INTRAVENOUS
Status: DISCONTINUED | OUTPATIENT
Start: 2024-11-30 | End: 2024-12-01 | Stop reason: HOSPADM

## 2024-11-30 RX ORDER — PROPOFOL 10 MG/ML
100 INJECTION, EMULSION INTRAVENOUS ONCE
Status: COMPLETED | OUTPATIENT
Start: 2024-11-30 | End: 2024-11-30

## 2024-11-30 RX ADMIN — PROPOFOL 100 MG: 10 INJECTION, EMULSION INTRAVENOUS at 23:18

## 2024-11-30 ASSESSMENT — ACTIVITIES OF DAILY LIVING (ADL)
ADLS_ACUITY_SCORE: 53
ADLS_ACUITY_SCORE: 53

## 2024-11-30 ASSESSMENT — COLUMBIA-SUICIDE SEVERITY RATING SCALE - C-SSRS
2. HAVE YOU ACTUALLY HAD ANY THOUGHTS OF KILLING YOURSELF IN THE PAST MONTH?: NO
6. HAVE YOU EVER DONE ANYTHING, STARTED TO DO ANYTHING, OR PREPARED TO DO ANYTHING TO END YOUR LIFE?: NO
1. IN THE PAST MONTH, HAVE YOU WISHED YOU WERE DEAD OR WISHED YOU COULD GO TO SLEEP AND NOT WAKE UP?: NO

## 2024-12-01 VITALS
DIASTOLIC BLOOD PRESSURE: 49 MMHG | SYSTOLIC BLOOD PRESSURE: 126 MMHG | RESPIRATION RATE: 13 BRPM | HEART RATE: 63 BPM | OXYGEN SATURATION: 96 %

## 2024-12-01 LAB — TROPONIN T SERPL HS-MCNC: 8 NG/L

## 2024-12-01 PROCEDURE — 84484 ASSAY OF TROPONIN QUANT: CPT | Performed by: FAMILY MEDICINE

## 2024-12-01 PROCEDURE — 36415 COLL VENOUS BLD VENIPUNCTURE: CPT | Performed by: FAMILY MEDICINE

## 2024-12-01 NOTE — DISCHARGE INSTRUCTIONS
Continue your current medications including the Eliquis.  Follow-up with cardiology as scheduled.  You may want to look into getting a smart watch that monitors for atrial fibrillation.  Return to the ED if you worsen or have any concerns.  It was nice visiting with both of you tonight.  Glad you are feeling better and hope you continue to stay in a normal rhythm.    Thank you for choosing [facility]. We appreciate the opportunity to meet your urgent medical needs. Please let us know if we could have done anything to make your stay more satisfying.    After discharge, please closely monitor for any new or worsening symptoms. Return to the Emergency Department if you develop any acute worsening signs or symptoms.    If you had lab work, cultures or imaging studies done during your stay, the final results may still be pending. We will call you if your plan of care needs to change. However, if you are not improving as expected, please follow up with your primary care provider or clinic.     Start any prescription medications that were prescribed to you and take them as directed.     Please see additional handouts that may be pertinent to your condition.

## 2024-12-01 NOTE — ED PROVIDER NOTES
"  History     Chief Complaint   Patient presents with    Atrial Fib    Chest Pain     HPI  Dom Durham is a 59 year old male who resents to the ED with a rapid heartbeat and associated chest discomfort.  He has a history of A-fib and underwent cardioversion on 9/13/2023 with Dr. Akbar.  Went back into A-fib sometime within the next month.  Had a transesophageal echo on 10/24/2023 and they were going to cardiovert him but it sounds like they just increased his medication and sent him home.  He only thing he is on right now is metoprolol for rate control and a small dose of lisinopril for his cardiomyopathy.  His last echo in October 2023 showed improvement in his LV function with an EF of 50-55%.  He is a mildly dilated left ventricle.  He fortunately is on Eliquis for anticoagulation.    Describes the chest discomfort as mild.  He had some shortness of breath last night and got up and then felt better but tonight felt his heart beating quite fast and hard about an hour ago.  He was not aware that he was in A-fib last year when he required cardioversion but he thinks he is pretty sure when it started this time around and in any event he is anticoagulated.    Last meal was at 6 PM.    Allergies:  Allergies   Allergen Reactions    Amoxicillin-Pot Clavulanate Other (See Comments)     \"I was so weak and just felt like dying\"    Dilaudid [Hydromorphone] Nausea and Vomiting     \"ok with Percocet\"    Hydrocodone Nausea     \"also did not do anything for my pain\"       Problem List:    Patient Active Problem List    Diagnosis Date Noted    CHF (congestive heart failure) (H) 03/08/2024     Priority: Medium    New onset a-fib (H) 09/14/2023     Priority: Medium    Essential hypertension 10/10/2021     Priority: Medium    Bilateral hearing loss 01/24/2020     Priority: Medium     Formatting of this note might be different from the original.  Loud noise.      Dermatitis of external ear 01/24/2020     Priority: Medium    Hx " of colonic polyp 01/24/2020     Priority: Medium     Formatting of this note might be different from the original.  Tubular adenoma      Hypothyroidism, unspecified type 05/15/2013     Priority: Medium    Fatty liver 05/15/2013     Priority: Medium    Hyperlipemia 01/08/2009     Priority: Medium     Formatting of this note might be different from the original.  The 10-year CVD risk score (Elias et al., 2008) is: 15.4%-> consider CT coronary calcium screen    Values used to calculate the score:      Age: 54 years      Sex: Male      Diabetic: No      Tobacco smoker: No      Systolic Blood Pressure: 148 mmHg      Is BP treated: No      HDL Cholesterol: 57 mg/dL      Total Cholesterol: 232 mg/dL      Vitamin D deficiency 01/08/2009     Priority: Medium        Past Medical History:    Past Medical History:   Diagnosis Date    History of ankle fracture     Hypertension     Thyroid dysfunction        Past Surgical History:    Past Surgical History:   Procedure Laterality Date    ANESTHESIA CARDIOVERSION N/A 9/14/2023    Procedure: Anesthesia cardioversion;  Surgeon: GENERIC ANESTHESIA PROVIDER;  Location:  OR    ANKLE SURGERY  1984    COLONOSCOPY N/A 6/10/2016    Procedure: COLONOSCOPY;  Surgeon: Jarek Chaudhari MD;  Location:  GI    COLONOSCOPY N/A 2/9/2023    Procedure: COLONOSCOPY;  Surgeon: Vitaliy Huff DO;  Location:  GI    ESOPHAGOSCOPY, GASTROSCOPY, DUODENOSCOPY (EGD), COMBINED  5/24/2013    Procedure: COMBINED ESOPHAGOSCOPY, GASTROSCOPY, DUODENOSCOPY (EGD), BIOPSY SINGLE OR MULTIPLE;;  Surgeon: Timothy Jennings MD;  Location:  GI    LAPAROSCOPY DIAGNOSTIC (GENERAL)  8/14/2013    Procedure: LAPAROSCOPY DIAGNOSTIC (GENERAL);  Diagnostic Laparoscopy;  Surgeon: Stu Arias MD;  Location:  OR    SHOULDER SURGERY         Family History:    Family History   Problem Relation Age of Onset    Hypertension Father     Prostate Cancer Father     Cancer Father         primary not known     Thyroid Disease Sister     Liver Disease Brother     Liver Disease Brother     Colon Cancer No family hx of        Social History:  Marital Status:   [2]  Social History     Tobacco Use    Smoking status: Never    Smokeless tobacco: Never   Vaping Use    Vaping status: Never Used   Substance Use Topics    Alcohol use: Yes     Alcohol/week: 6.0 standard drinks of alcohol     Types: 6 Shots of liquor per week    Drug use: No        Medications:    apixaban ANTICOAGULANT (ELIQUIS) 5 MG tablet  levothyroxine (SYNTHROID/LEVOTHROID) 88 MCG tablet  lisinopril (ZESTRIL) 2.5 MG tablet  metoprolol succinate ER (TOPROL XL) 25 MG 24 hr tablet          Review of Systems   All other systems reviewed and are negative.      Physical Exam   BP: 105/87  Pulse: (!) 137  Resp: 13  SpO2: (!) 89 %      Physical Exam  Constitutional:       General: He is not in acute distress.     Appearance: He is well-developed.   Cardiovascular:      Rate and Rhythm: Tachycardia present. Rhythm irregular.   Neurological:      Mental Status: He is alert.         [facility]    -Cardioversion External    Date/Time: 11/30/2024 10:41 PM    Performed by: David Blackman MD  Authorized by: David Blackman MD    Risks, benefits and alternatives discussed.    ED EVALUATION:      Assessment Time: 11/30/2024 10:41 PM      I have performed an Emergency Department Evaluation including taking a history and physical examination, this evaluation will be documented in the electronic medical record for this ED encounter.     Indication: Atrial fibrillation with rapid ventricular response    ASA Class: Class 2- mild systemic disease, no acute problems, no functional limitations    Mallampati: Grade 2- soft palate, base of uvula, tonsillar pillars, and portion of posterior pharyngeal wall visible    NPO Status: yes    UNIVERSAL PROTOCOL   Site Marked: NA  Prior Images Obtained and Reviewed:   NA  Required items: Required blood products, implants, devices and special equipment available    Patient identity confirmed:  Verbally with patient and arm band  Patient was reevaluated immediately before administering moderate or deep sedation or anesthesia  Confirmation Checklist:  Patient's identity using two indicators, relevant allergies, procedure was appropriate and matched the consent or emergent situation and correct equipment/implants were available  Time out: Immediately prior to the procedure a time out was called    Universal Protocol: the Joint Commission Universal Protocol was followed    Preparation: Patient was prepped and draped in usual sterile fashion      SEDATION  Patient Sedated: Yes    Sedation Type:  Deep  Sedation:  Propofol and see MAR for details  Vital signs: Vital signs monitored during sedation      PRE-PROCEDURE DETAILS:     Cardioversion basis:  Emergent    Rhythm:  Atrial fibrillation    Electrode placement:  Anterior-posterior  Attempt one:     Cardioversion mode:  Synchronous    Waveform:  Biphasic    Shock (Joules):  200    Shock outcome:  Conversion to normal sinus rhythm  Post-procedure details:     Patient status:  Awake      PROCEDURE    Patient Tolerance:  Patient tolerated the procedure well with no immediate complications  Length of time physician/provider present for 1:1 monitoring during sedation: 10                EKG Interpretation:      Interpreted by David Blackman MD at 2205  A-fib with RVR at 129 bpm.  Nonspecific ST/T wave changes.  A-fib is new compared to the EKG from 1/30/2024.    EKG #2 interpreted by me at 2302  Sinus rhythm at 71 bpm.  Normal EKG.  Previous A-fib has converted.      Critical Care time:  none                  Medications   flumazenil (ROMAZICON) injection 0.2 mg (has no administration in time range)   propofol (DIPRIVAN) injection 10 mg/mL vial (100 mg Intravenous $Given 11/30/24 6389)       Assessments & Plan (with Medical Decision  Making)  59-year-old with a history of A-fib in the past as well as cardiomyopathy had improvement in his ejection fraction last year with an EF of 50-55%.  Underwent cardioversion in September 2023.  Had a transesophageal echo a month later when they found that he was back in A-fib and was slated for a second cardioversion but apparently they just increased his metoprolol and he converted on his own as best as I can tell from the chart and the patient history.  Felt a little short of breath last night when he got up but did not notice any rapid heartbeat.  Tonight started feeling his heart beating fast about an hour prior to coming in associated with some chest discomfort.  EKG shows rapid A-fib.  This discomfort has subsided a bit but still slightly present.  Blood pressure is 105/87.  Not a lot of room to work with rate control medications.  Discussed cardioversion and he is going to consider it.  I told him I could discuss this with cardiology and get their blessing but I suspect they would recommend cardioversion as he is already anticoagulated.  2229: I spoke with Dr. Howard from cardiology at [facility].  He agreed that cardioversion would be the preferred treatment especially since he has been on his Eliquis religiously.  Discussed with patient and he agrees to proceed.  He was sedated with propofol and synchronized cardioversion performed at 200 J x 1 with successful conversion to normal sinus rhythm.  He tolerated this well.  He was observed for over an hour and remained in normal sinus rhythm.  Feels much better and wishes to go home.  He will continue on his Eliquis and keep his cardiology appointment at his scheduled for 1/15/2025 with Dr. Lee.  He is going to look into getting a smart watch that could monitor for recurrence of his A-fib.  We discussed reportable signs and when to return.  Verbal written discharge instructions given.  He is comfortable this plan.  His wife will drive him home.      I have reviewed the nursing notes.    I have reviewed the findings, diagnosis, plan and need for follow up with the patient.           Medical Decision Making  The patient's presentation was of high complexity (an acute health issue posing potential threat to life or bodily function).    The patient's evaluation involved:  review of external note(s) from 3+ sources (see separate area of note for details)  ordering and/or review of 3+ test(s) in this encounter (see separate area of note for details)    The patient's management necessitated high risk (moderate or deep procedural sedation) and high risk (cardioversion).        New Prescriptions    No medications on file       Final diagnoses:   Atrial fibrillation with rapid ventricular response (H) - recurrent   Long term (current) use of anticoagulants       11/30/2024   [facility] EMERGENCY DEPT       David Blackman MD  12/01/24 0038

## 2024-12-01 NOTE — ED TRIAGE NOTES
Triage Assessment (Adult)       Row Name 11/30/24 4682          Triage Assessment    Airway WDL WDL        Respiratory WDL    Respiratory WDL WDL                   History of a-fib.  Felt his pulse become irregular and faster about 2 hours ago with chest pain.  Had some shortness of breathe and fatigue last night.  
18

## 2025-01-07 ENCOUNTER — OFFICE VISIT (OUTPATIENT)
Dept: FAMILY MEDICINE | Facility: OTHER | Age: 60
End: 2025-01-07
Payer: COMMERCIAL

## 2025-01-07 VITALS
SYSTOLIC BLOOD PRESSURE: 118 MMHG | OXYGEN SATURATION: 94 % | RESPIRATION RATE: 16 BRPM | DIASTOLIC BLOOD PRESSURE: 76 MMHG | HEART RATE: 64 BPM | TEMPERATURE: 97 F | WEIGHT: 223.5 LBS | BODY MASS INDEX: 31.17 KG/M2

## 2025-01-07 DIAGNOSIS — K59.00 CONSTIPATION, UNSPECIFIED CONSTIPATION TYPE: ICD-10-CM

## 2025-01-07 DIAGNOSIS — Z12.5 SCREENING FOR PROSTATE CANCER: ICD-10-CM

## 2025-01-07 DIAGNOSIS — R39.11 URINARY HESITANCY: Primary | ICD-10-CM

## 2025-01-07 LAB
ALBUMIN UR-MCNC: NEGATIVE MG/DL
ANION GAP SERPL CALCULATED.3IONS-SCNC: 12 MMOL/L (ref 7–15)
APPEARANCE UR: CLEAR
BILIRUB UR QL STRIP: NEGATIVE
BUN SERPL-MCNC: 20 MG/DL (ref 8–23)
CALCIUM SERPL-MCNC: 9.7 MG/DL (ref 8.8–10.4)
CHLORIDE SERPL-SCNC: 104 MMOL/L (ref 98–107)
COLOR UR AUTO: YELLOW
CREAT SERPL-MCNC: 1 MG/DL (ref 0.67–1.17)
EGFRCR SERPLBLD CKD-EPI 2021: 86 ML/MIN/1.73M2
ERYTHROCYTE [DISTWIDTH] IN BLOOD BY AUTOMATED COUNT: 12.4 % (ref 10–15)
GLUCOSE SERPL-MCNC: 94 MG/DL (ref 70–99)
GLUCOSE UR STRIP-MCNC: NEGATIVE MG/DL
HCO3 SERPL-SCNC: 24 MMOL/L (ref 22–29)
HCT VFR BLD AUTO: 43 % (ref 40–53)
HGB BLD-MCNC: 14.2 G/DL (ref 13.3–17.7)
HGB UR QL STRIP: NEGATIVE
KETONES UR STRIP-MCNC: NEGATIVE MG/DL
LEUKOCYTE ESTERASE UR QL STRIP: NEGATIVE
MCH RBC QN AUTO: 30.3 PG (ref 26.5–33)
MCHC RBC AUTO-ENTMCNC: 33 G/DL (ref 31.5–36.5)
MCV RBC AUTO: 92 FL (ref 78–100)
NITRATE UR QL: NEGATIVE
PH UR STRIP: 6.5 [PH] (ref 5–7)
PLATELET # BLD AUTO: 163 10E3/UL (ref 150–450)
POTASSIUM SERPL-SCNC: 5.1 MMOL/L (ref 3.4–5.3)
PSA SERPL DL<=0.01 NG/ML-MCNC: 1.46 NG/ML (ref 0–4.5)
RBC # BLD AUTO: 4.68 10E6/UL (ref 4.4–5.9)
SODIUM SERPL-SCNC: 140 MMOL/L (ref 135–145)
SP GR UR STRIP: 1.02 (ref 1–1.03)
UROBILINOGEN UR STRIP-ACNC: 2 E.U./DL
WBC # BLD AUTO: 9 10E3/UL (ref 4–11)

## 2025-01-07 PROCEDURE — 87086 URINE CULTURE/COLONY COUNT: CPT | Performed by: PHYSICIAN ASSISTANT

## 2025-01-07 PROCEDURE — 99214 OFFICE O/P EST MOD 30 MIN: CPT | Performed by: PHYSICIAN ASSISTANT

## 2025-01-07 PROCEDURE — G0103 PSA SCREENING: HCPCS | Performed by: PHYSICIAN ASSISTANT

## 2025-01-07 PROCEDURE — 80048 BASIC METABOLIC PNL TOTAL CA: CPT | Performed by: PHYSICIAN ASSISTANT

## 2025-01-07 PROCEDURE — 36415 COLL VENOUS BLD VENIPUNCTURE: CPT | Performed by: PHYSICIAN ASSISTANT

## 2025-01-07 PROCEDURE — 81003 URINALYSIS AUTO W/O SCOPE: CPT | Performed by: PHYSICIAN ASSISTANT

## 2025-01-07 PROCEDURE — 85027 COMPLETE CBC AUTOMATED: CPT | Performed by: PHYSICIAN ASSISTANT

## 2025-01-07 NOTE — PROGRESS NOTES
"  Assessment & Plan     Urinary hesitancy   Patient is a 59 year old male who presents today with concerns about UTI or similar infection. He informs me that he was seen at [facility] in 2021 and treated for UTI. I was able to find UA results from 07/2021 which were positive for nitrites. Patient estimates that his current symptoms have been present for 3-4 weeks. Feels that urine has odd odor and that it has more difficult to initiate urine flow. Discussed ruling out infection with the patient. He will submit UA sample and complete BMP and CBC. Since the end of the visit the patient's UA returned without evidence of infection and cell counts have returned normal. I will wait on BMP. Pending results will treat vs have patient connect with urologist.   - CBC with platelets; Future  - UA Macroscopic with reflex to Microscopic and Culture - Lab Collect; Future  - Basic metabolic panel  (Ca, Cl, CO2, Creat, Gluc, K, Na, BUN); Future  - CBC with platelets  - UA Macroscopic with reflex to Microscopic and Culture - Lab Collect  - Basic metabolic panel  (Ca, Cl, CO2, Creat, Gluc, K, Na, BUN)  - Urine Culture Aerobic Bacterial - lab collect; Future    Constipation, unspecified constipation type  Patient has been experiencing bristol type 1-2 stools. He has been using miralax without much benefit. I will have him continue this daily while also adding in fiber supplement daily and increasing hydration. He will update me if not improving as expected.   - methylcellulose (CITRUCEL) powder; Mix 1 spoonful into an 8oz glass up to 3x/daily as needed for bowel movements    Screen for prostate cancer  I do not see a PSA for the patient in >1yr. I will also update this today.       BMI  Estimated body mass index is 31.17 kg/m  as calculated from the following:    Height as of 6/7/24: 1.803 m (5' 11\").    Weight as of this encounter: 101.4 kg (223 lb 8 oz).   Weight management plan: Discussed healthy diet and exercise " guidelines    Subjective   Jigar is a 59 year old, presenting for the following health issues:  UTI concern      1/7/2025    11:08 AM   Additional Questions   Roomed by Amrita GOLDEN   Accompanied by self     History of Present Illness       Reason for visit:  Sick  Symptom onset:  More than a month  Symptoms include:  Stomach pain  Symptom intensity:  Moderate  Symptom progression:  Worsening  Had these symptoms before:  Yes  Has tried/received treatment for these symptoms:  Yes  Previous treatment was successful:  Yes  Prior treatment description:  Meds  What makes it worse:  Water  What makes it better:  No He is missing 1 dose(s) of medications per week.  He is not taking prescribed medications regularly due to remembering to take.     Genitourinary - Male  Onset/Duration: 1-2 months ago  Description:   Dysuria (painful urination): No}  Hematuria (blood in urine): No  Frequency: No  Waking at night to urinate: No  Hesitancy (delay in urine): No  Retention (unable to empty): YES  Decrease in urinary flow: No  Incontinence: No  Progression of Symptoms:  worsening  Accompanying Signs & Symptoms:  Fever: No  Back/Flank pain: No  Urethral discharge: No  Testicle lumps/masses/pain: No  Nausea and/or vomiting: YES- nausea when drinking water  Abdominal pain: YES  History:   History of frequent UTI s: YES  History of kidney stones: No  History of hernias: No  Personal or Family history of Prostate problems: YES-father  Sexually active: YES  Precipitating or alleviating factors: None  Therapies tried and outcome: none        Review of Systems  Constitutional, HEENT, cardiovascular, pulmonary, gi and gu systems are negative, except as otherwise noted.      Objective    /76   Pulse 64   Temp 97  F (36.1  C) (Temporal)   Resp 16   Wt 101.4 kg (223 lb 8 oz)   SpO2 94%   BMI 31.17 kg/m    Body mass index is 31.17 kg/m .  Physical Exam   GENERAL: alert and no distress  NECK: no adenopathy, no asymmetry, masses, or  scars  RESP: lungs clear to auscultation - no rales, rhonchi or wheezes  CV: regular rate and rhythm, normal S1 S2, no S3 or S4, no murmur, click or rub, no peripheral edema  ABDOMEN: soft, nontender, no hepatosplenomegaly, no masses and bowel sounds normal  MS: no gross musculoskeletal defects noted, no edema  PSYCH: mentation appears normal, affect normal/bright    Results for orders placed or performed in visit on 01/07/25   CBC with platelets     Status: Normal   Result Value Ref Range    WBC Count 9.0 4.0 - 11.0 10e3/uL    RBC Count 4.68 4.40 - 5.90 10e6/uL    Hemoglobin 14.2 13.3 - 17.7 g/dL    Hematocrit 43.0 40.0 - 53.0 %    MCV 92 78 - 100 fL    MCH 30.3 26.5 - 33.0 pg    MCHC 33.0 31.5 - 36.5 g/dL    RDW 12.4 10.0 - 15.0 %    Platelet Count 163 150 - 450 10e3/uL   UA Macroscopic with reflex to Microscopic and Culture - Lab Collect     Status: Abnormal    Specimen: Urine, Midstream   Result Value Ref Range    Color Urine Yellow Colorless, Straw, Light Yellow, Yellow    Appearance Urine Clear Clear    Glucose Urine Negative Negative mg/dL    Bilirubin Urine Negative Negative    Ketones Urine Negative Negative mg/dL    Specific Gravity Urine 1.020 1.003 - 1.035    Blood Urine Negative Negative    pH Urine 6.5 5.0 - 7.0    Protein Albumin Urine Negative Negative mg/dL    Urobilinogen Urine 2.0 (A) 0.2, 1.0 E.U./dL    Nitrite Urine Negative Negative    Leukocyte Esterase Urine Negative Negative    Narrative    Microscopic not indicated           Signed Electronically by: Aftab Guevara PA-C

## 2025-01-09 LAB — BACTERIA UR CULT: NO GROWTH

## 2025-01-14 DIAGNOSIS — I48.91 NEW ONSET A-FIB (H): ICD-10-CM

## 2025-01-14 DIAGNOSIS — I42.9 CARDIOMYOPATHY, UNSPECIFIED TYPE (H): ICD-10-CM

## 2025-01-14 RX ORDER — METOPROLOL SUCCINATE 25 MG/1
50 TABLET, EXTENDED RELEASE ORAL DAILY
Qty: 180 TABLET | Refills: 0 | Status: SHIPPED | OUTPATIENT
Start: 2025-01-14 | End: 2025-01-15

## 2025-01-15 ENCOUNTER — OFFICE VISIT (OUTPATIENT)
Dept: CARDIOLOGY | Facility: CLINIC | Age: 60
End: 2025-01-15
Attending: NURSE PRACTITIONER
Payer: COMMERCIAL

## 2025-01-15 ENCOUNTER — TELEPHONE (OUTPATIENT)
Dept: CARDIOLOGY | Facility: CLINIC | Age: 60
End: 2025-01-15

## 2025-01-15 VITALS
OXYGEN SATURATION: 96 % | DIASTOLIC BLOOD PRESSURE: 74 MMHG | HEIGHT: 71 IN | SYSTOLIC BLOOD PRESSURE: 112 MMHG | WEIGHT: 224 LBS | HEART RATE: 57 BPM | BODY MASS INDEX: 31.36 KG/M2

## 2025-01-15 DIAGNOSIS — R36.1 BLOODY EJACULATION: Primary | ICD-10-CM

## 2025-01-15 DIAGNOSIS — R07.2 PRECORDIAL PAIN: ICD-10-CM

## 2025-01-15 DIAGNOSIS — E78.00 HYPERCHOLESTEROLEMIA: Primary | ICD-10-CM

## 2025-01-15 DIAGNOSIS — I25.10 CORONARY ARTERY DISEASE INVOLVING NATIVE CORONARY ARTERY OF NATIVE HEART, UNSPECIFIED WHETHER ANGINA PRESENT: ICD-10-CM

## 2025-01-15 ASSESSMENT — PAIN SCALES - GENERAL: PAINLEVEL_OUTOF10: NO PAIN (0)

## 2025-01-15 NOTE — LETTER
"1/15/2025    Aftab Guevara PA-C  [address] [facility] [address]    RE: Dom Durham       Dear Colleague,     I had the pleasure of seeing Dom Durham in the [facility].  HISTORY OF PRESENT ILLNESS:  Dom Durham a 59 year old male with paroxysmal atrial fibrillation, hypertension, obesity, sleep  apnea, history of alcohol use disorder, and hypothyroidism CAD     Chest discomfort    4th marriage  likes chopping woods dogs chickens,  No diabetes,hypertension, no stroke, no vascular disease, no alcohol,  in November 9:00 shocked  blood in ejaculate urine test    Orders this Visit:  Orders Placed This Encounter   Procedures     EKG 12-lead complete w/read - Clinics (performed today)     No orders of the defined types were placed in this encounter.    There are no discontinued medications.    Encounter Diagnosis   Name Primary?     Atrial fibrillation with RVR (H)        CURRENT MEDICATIONS:  Current Outpatient Medications   Medication Sig Dispense Refill     apixaban ANTICOAGULANT (ELIQUIS) 5 MG tablet Take 1 tablet (5 mg) by mouth 2 times daily 180 tablet 4     levothyroxine (SYNTHROID/LEVOTHROID) 88 MCG tablet Take 1 tablet (88 mcg) by mouth daily 90 tablet 2     lisinopril (ZESTRIL) 2.5 MG tablet Take 1 tablet (2.5 mg) by mouth daily 90 tablet 4     methylcellulose (CITRUCEL) powder Mix 1 spoonful into an 8oz glass up to 3x/daily as needed for bowel movements 454 g 2     metoprolol succinate ER (TOPROL XL) 25 MG 24 hr tablet Take 2 tablets (50 mg) by mouth daily. 180 tablet 0       ALLERGIES     Allergies   Allergen Reactions     Amoxicillin-Pot Clavulanate Other (See Comments)     \"I was so weak and just felt like dying\"     Dilaudid [Hydromorphone] Nausea and Vomiting     \"ok with Percocet\"     Hydrocodone Nausea     \"also did not do anything for my pain\"       PAST MEDICAL, SURGICAL, FAMILY, SOCIAL HISTORY:  History was reviewed and updated as needed, see " "medical record.        Review of Systems:  A 12-point review of systems was completed, see medical record for detailed review of systems information.    Physical Exam:  Vitals: /74 (BP Location: Right arm, Patient Position: Sitting, Cuff Size: Adult Large)   Pulse 57   Ht 1.803 m (5' 11\")   Wt 101.6 kg (224 lb)   SpO2 96%   BMI 31.24 kg/m      Constitutional: No apparent distress    HEENT: pupils equal round reactive to light, thyroid normal size, JVP is normal    Chest: Clear to percussion and auscultation    Cardiac: Normal S1, normal S2 no S3, no murmur or click    Abdomen: Scaphoid.  Liver percusses to 6 cm spleen is not palpable aorta is not tender or enlarged    Extremitiies: no edema.    Neurological:  Cranial nerves II through XII are intact, strength equal and symmetrical, displays normal insight and judgment        ASSESSMENT: Dom Durham is a 59 year old male with          We reviewed the benefits of a regular exercise program, a Mediterranean-style weight loss diet, and contacting us for any changes in between now and the time of her next visit.     RECOMMENDATIONS:   1) urologist to discuss blood in ejaculate  2) stress echocardiogram  3) stop lisinopril/metoprolol xl. Follow up with ANA ROSA after stress echocardiogram, if remains NSR stop apixaban ( CHADSVaSc = 1)   4) atorvastatin 80 mg daily  5) Mediterranean style diet  6) Follow up in one month to 6 weeks with ANA ROSA to review stress test, assess symptoms, check lipids         Recent Lab Results:  LIPID RESULTS:  Lab Results   Component Value Date    CHOL 285 (H) 03/08/2024    CHOL 195 08/24/2015    HDL 47 03/08/2024    HDL 40 (L) 08/24/2015     (H) 03/08/2024     (H) 08/24/2015    TRIG 265 (H) 03/08/2024    TRIG 65 08/24/2015    CHOLHDLRATIO 4.9 08/24/2015       LIVER ENZYME RESULTS:  Lab Results   Component Value Date    AST 26 11/30/2024    AST 44 10/09/2017    ALT 16 11/30/2024    ALT 60 10/09/2017       CBC " "RESULTS:  Lab Results   Component Value Date    WBC 9.0 01/07/2025    WBC 7.5 10/05/2017    RBC 4.68 01/07/2025    RBC 5.17 10/05/2017    HGB 14.2 01/07/2025    HGB 16.2 10/05/2017    HCT 43.0 01/07/2025    HCT 49.5 10/05/2017    MCV 92 01/07/2025    MCV 96 10/05/2017    MCH 30.3 01/07/2025    MCH 31.3 10/05/2017    MCHC 33.0 01/07/2025    MCHC 32.7 10/05/2017    RDW 12.4 01/07/2025    RDW 12.7 10/05/2017     01/07/2025     10/05/2017       BMP RESULTS:  Lab Results   Component Value Date     01/07/2025     10/09/2017    POTASSIUM 5.1 01/07/2025    POTASSIUM 4.4 10/08/2021    POTASSIUM 4.0 10/09/2017    CHLORIDE 104 01/07/2025    CHLORIDE 105 10/08/2021    CHLORIDE 105 10/09/2017    CO2 24 01/07/2025    CO2 25 10/08/2021    CO2 26 10/09/2017    ANIONGAP 12 01/07/2025    ANIONGAP 6 10/08/2021    ANIONGAP 8 10/09/2017    GLC 94 01/07/2025     (H) 10/08/2021    GLC 90 10/09/2017    BUN 20.0 01/07/2025    BUN 18 10/08/2021    BUN 11 10/09/2017    CR 1.00 01/07/2025    CR 0.85 10/09/2017    GFRESTIMATED 86 01/07/2025    GFRESTIMATED >90 10/09/2017    GFRESTBLACK >90 10/09/2017    SHELIA 9.7 01/07/2025    SHELIA 9.5 10/09/2017        A1C RESULTS:  No results found for: \"A1C\"    INR RESULTS:  Lab Results   Component Value Date    INR 1.30 (H) 09/14/2023       We greatly appreciate the opportunity to be involved in the care of your patient, Dom Durham.    Sincerely,  Salvador Lee MD      CC  Yvonne Ruano, APRN CNP  [address]  [place] [address]                                                                       Thank you for allowing me to participate in the care of your patient.      Sincerely,     Salvador Lee MD     [facility]  cc:   KASIE Luther CNP  [address]  [place] [address]      "

## 2025-01-15 NOTE — PROGRESS NOTES
HISTORY OF PRESENT ILLNESS:  Dom Durham a 59 year old male with paroxysmal atrial fibrillation, obesity, sleep  apnea, history of alcohol use disorder, and hypothyroidism and coronary artery disease ( previous CT scan showing  coronary calcification) was seen today at the request of his primary care physician for follow-up of an episode of atrial fibrillation, successfully cardioverted to a sinus rhythm in the [facility] emergency room    The patient was initially seen in consultation by Dr. Cano on 9/13/2023 for a new diagnosis of persistent atrial fibrillation in the setting of significant alcohol use CT scan was negative for pulmonary embolus the patient was placed on anticoagulation diltiazem and metoprolol.  He underwent successful JUANY cardioversion 9/14/2023 and was discharged 9/15/2023.    He return for follow-up visit at the [facility] clinic in October 2023 after an echocardiogram showed that his ejection fraction had returned to normal, but he was back in atrial fibrillation.  There were initial plans for repeat cardioversion at that time but the patient spontaneously converted back to a sinus rhythm without intervention.  During his last visit on 1/30/2024, the patient remained in a sinus rhythm.  He was continued on chronic anticoagulation with plans for follow-up in the future.  On 11/30/2024, the patient developed the abrupt onset of recurrent atrial fibrillation accompanied by mild chest ,discomfort and presented to the [facility] emergency room.  High sensitivity troponin levels, CBC, and comprehensive metabolic profile were all unremarkable.  A TSH from March 2024 was normal.  Since he was still on apixaban, underwent successful cardioversion back to a sinus rhythm and has been free of symptoms since then.  Follow-up was arranged for today    There is no history of diabetes mellitus, stroke, valve disease, hyperthyroidism, documented vascular disease, or recent heart  failure symptoms.  Upon review of his charts, I note he underwent a CT scan exclusion of pulmonary embolus in 2023 that demonstrated mild coronary calcification.  His most recent LDL cholesterol is 185.    Since his marriage last year, the patient's been abstinent from alcohol, follows a prudent diet, and exercises regularly.  His blood pressures have remained optimal without recurrent palpitation.  He does not describe typical chest arm neck jaw or back discomfort with exertion. He has successfully lost weight from 238 lbs 2/2024, to 224 lbs today. He mentioned several episodes  of hematospermia which he has  not discussed with his doctor.     PAST MEDICAL HISTORY  1) Coronary artery disease :  mild coronary calcification on CT.LDL C 185  2) Paroxysmal Atrial fibrillation sp CDV x 2. CHADSVaSc  =1 ( at most due to coronary calcification ie vascular disease) No CHF, HTN, Diabetes, Stroke) remains on anticoagulation. SP cardioversion 11/2024 Last TTE LVEF normal 2023  3) distant history of alcohol use disorder, no longer using  4) EMEKA using CPAP device              Orders this Visit:  Orders Placed This Encounter   Procedures    EKG 12-lead complete w/read - Clinics (performed today)     No orders of the defined types were placed in this encounter.    There are no discontinued medications.    Encounter Diagnosis   Name Primary?    Atrial fibrillation with RVR (H)        CURRENT MEDICATIONS:  Current Outpatient Medications   Medication Sig Dispense Refill    apixaban ANTICOAGULANT (ELIQUIS) 5 MG tablet Take 1 tablet (5 mg) by mouth 2 times daily 180 tablet 4    levothyroxine (SYNTHROID/LEVOTHROID) 88 MCG tablet Take 1 tablet (88 mcg) by mouth daily 90 tablet 2    lisinopril (ZESTRIL) 2.5 MG tablet Take 1 tablet (2.5 mg) by mouth daily 90 tablet 4    methylcellulose (CITRUCEL) powder Mix 1 spoonful into an 8oz glass up to 3x/daily as needed for bowel movements 454 g 2    metoprolol succinate ER (TOPROL XL) 25 MG 24 hr  "tablet Take 2 tablets (50 mg) by mouth daily. 180 tablet 0       ALLERGIES     Allergies   Allergen Reactions    Amoxicillin-Pot Clavulanate Other (See Comments)     \"I was so weak and just felt like dying\"    Dilaudid [Hydromorphone] Nausea and Vomiting     \"ok with Percocet\"    Hydrocodone Nausea     \"also did not do anything for my pain\"       PAST MEDICAL, SURGICAL, FAMILY, SOCIAL HISTORY:  History was reviewed and updated as needed, see medical record.        Review of Systems:  A 12-point review of systems was completed, see medical record for detailed review of systems information.    Physical Exam:  Vitals: /74 (BP Location: Right arm, Patient Position: Sitting, Cuff Size: Adult Large)   Pulse 57   Ht 1.803 m (5' 11\")   Wt 101.6 kg (224 lb)   SpO2 96%   BMI 31.24 kg/m      Constitutional: No apparent distress    HEENT: pupils equal round reactive to light, thyroid normal size, JVP is normal    Chest: Clear to percussion and auscultation    Cardiac: Normal S1, normal S2 no S3, no murmur or click    Abdomen: Scaphoid.  Liver percusses to 6 cm spleen is not palpable aorta is not tender or enlarged    Extremitiies: no edema.    Neurological:  Cranial nerves II through XII are intact, strength equal and symmetrical, displays normal insight and judgment        ASSESSMENT: I am pleased the patient has remained free of recurrent symptomatic atrial fibrillation 11/2024/  His CHADSVaSc is at most 1 based on either the presence of coronary calcification indicative of vascular disease and a very poorly documented history of hypertension.  If his left ventricular systolic performance remains well-preserved, the benefit of long-term anticoagulation is questionable.  The patient no longer uses alcohol and engages in a very healthy lifestyle.  The benefit of lisinopril and the metoprolol ER in this setting is also questionable, especially given his current blood pressures and normal LVEF  on last TTE..  I would " favor discontinuing Toprol-XL and lisinopril, especially if his left ventricular systolic performance remains normal (see below).  I warned the patient that even with lifestyle intervention, he may be subject to future episodes of atrial fibrillation that might change our therapy and require resumption of medications.    In view of the chest discomfort he experienced with his episode of atrial fibrillation, documented coronary calcification noted on a previous CT scan as well as an LDL cholesterol 185, I would favor high-dose statin therapy and a stress echocardiogram.  We discussed the benefit of a Mediterranean style diet and exercise program.  We will arrange follow-up with an ANA ROSA to review his stress echocardiogram, check a lipid profile, and make certain he remains in a sinus that  would allow discontinuation of apixaban, at which  time I would  start aspirin 81 mg daily.  Unless  significant ischemia and or left ventricular dysfunction is identified, it is unlikely further cardiac testing or intervention would be required.    In view of his  hematospermia, I  would advise consultation with a urologist after discussion with his primary care provider.         RECOMMENDATIONS:   1) urologist to discuss hematospermia  2) stress echocardiogram  3) stop lisinopril/metoprolol xl.  4) atorvastatin 80 mg daily  5) Mediterranean style diet  6) Follow up in one month to 6 weeks with ANA ROSA to review stress test, assess symptoms, check lipids ,blood  pressure and stop apixaban /begin aspirin 81 mg daily should he remain NSR. He should likely have follow up 6 months after ANA ROSA visit with ECG, earlier should atrial fibrillation recur.         Recent Lab Results:  LIPID RESULTS:  Lab Results   Component Value Date    CHOL 285 (H) 03/08/2024    CHOL 195 08/24/2015    HDL 47 03/08/2024    HDL 40 (L) 08/24/2015     (H) 03/08/2024     (H) 08/24/2015    TRIG 265 (H) 03/08/2024    TRIG 65 08/24/2015    CHOLHDLRATIO 4.9  "08/24/2015       LIVER ENZYME RESULTS:  Lab Results   Component Value Date    AST 26 11/30/2024    AST 44 10/09/2017    ALT 16 11/30/2024    ALT 60 10/09/2017       CBC RESULTS:  Lab Results   Component Value Date    WBC 9.0 01/07/2025    WBC 7.5 10/05/2017    RBC 4.68 01/07/2025    RBC 5.17 10/05/2017    HGB 14.2 01/07/2025    HGB 16.2 10/05/2017    HCT 43.0 01/07/2025    HCT 49.5 10/05/2017    MCV 92 01/07/2025    MCV 96 10/05/2017    MCH 30.3 01/07/2025    MCH 31.3 10/05/2017    MCHC 33.0 01/07/2025    MCHC 32.7 10/05/2017    RDW 12.4 01/07/2025    RDW 12.7 10/05/2017     01/07/2025     10/05/2017       BMP RESULTS:  Lab Results   Component Value Date     01/07/2025     10/09/2017    POTASSIUM 5.1 01/07/2025    POTASSIUM 4.4 10/08/2021    POTASSIUM 4.0 10/09/2017    CHLORIDE 104 01/07/2025    CHLORIDE 105 10/08/2021    CHLORIDE 105 10/09/2017    CO2 24 01/07/2025    CO2 25 10/08/2021    CO2 26 10/09/2017    ANIONGAP 12 01/07/2025    ANIONGAP 6 10/08/2021    ANIONGAP 8 10/09/2017    GLC 94 01/07/2025     (H) 10/08/2021    GLC 90 10/09/2017    BUN 20.0 01/07/2025    BUN 18 10/08/2021    BUN 11 10/09/2017    CR 1.00 01/07/2025    CR 0.85 10/09/2017    GFRESTIMATED 86 01/07/2025    GFRESTIMATED >90 10/09/2017    GFRESTBLACK >90 10/09/2017    SHELIA 9.7 01/07/2025    SHELIA 9.5 10/09/2017        A1C RESULTS:  No results found for: \"A1C\"    INR RESULTS:  Lab Results   Component Value Date    INR 1.30 (H) 09/14/2023       We greatly appreciate the opportunity to be involved in the care of your patient, Dom Durham.    Sincerely,  Salvador Lee MD      CC  Yvonne Ruano, APRN CNP  [address]  [place] [address]                                                                     "

## 2025-01-15 NOTE — TELEPHONE ENCOUNTER
Dr. Lee (cardiology) is requesting patient's PCP put in a referral for urology.     Dr. Lee Plan from today's OV 1/15/25  RECOMMENDATIONS:   1) urologist to discuss blood in ejaculate  2) stress echocardiogram  3) stop lisinopril/metoprolol xl. Follow up with ANA ROSA after stress echocardiogram, if remains NSR stop apixaban ( CHADSVaSc = 1)   4) atorvastatin 80 mg daily  5) Mediterranean style diet  6) Follow up in one month to 6 weeks with ANA ROSA to review stress test, assess symptoms, check lipids     Will route to KANDICE Nolasco

## 2025-01-21 ENCOUNTER — OFFICE VISIT (OUTPATIENT)
Dept: UROLOGY | Facility: CLINIC | Age: 60
End: 2025-01-21
Attending: MASSAGE THERAPIST
Payer: COMMERCIAL

## 2025-01-21 VITALS
WEIGHT: 220.56 LBS | BODY MASS INDEX: 30.88 KG/M2 | DIASTOLIC BLOOD PRESSURE: 90 MMHG | HEART RATE: 75 BPM | OXYGEN SATURATION: 100 % | RESPIRATION RATE: 16 BRPM | TEMPERATURE: 97.2 F | SYSTOLIC BLOOD PRESSURE: 139 MMHG | HEIGHT: 71 IN

## 2025-01-21 DIAGNOSIS — R36.1 HEMATOSPERMIA: Primary | ICD-10-CM

## 2025-01-21 DIAGNOSIS — R35.1 NOCTURIA: ICD-10-CM

## 2025-01-21 DIAGNOSIS — R03.0 ELEVATED BLOOD PRESSURE READING: ICD-10-CM

## 2025-01-21 LAB
ALBUMIN UR-MCNC: NEGATIVE MG/DL
APPEARANCE UR: CLEAR
BILIRUB UR QL STRIP: NEGATIVE
COLOR UR AUTO: NORMAL
GLUCOSE UR STRIP-MCNC: NEGATIVE MG/DL
HGB UR QL STRIP: NEGATIVE
KETONES UR STRIP-MCNC: NEGATIVE MG/DL
LEUKOCYTE ESTERASE UR QL STRIP: NEGATIVE
NITRATE UR QL: NEGATIVE
PH UR STRIP: 6.5 [PH] (ref 5–7)
SP GR UR STRIP: 1.02 (ref 1–1.03)
UROBILINOGEN UR STRIP-MCNC: NORMAL MG/DL

## 2025-01-21 ASSESSMENT — PAIN SCALES - GENERAL: PAINLEVEL_OUTOF10: NO PAIN (0)

## 2025-01-21 NOTE — PROGRESS NOTES
"  S: Dom Durham is a pleasant 59-year-old male requested to be seen in clinic by Atfab WOODSON for hematospermia.     Mr. Durham reports several episodes when he noticed blood in his semen. He states that this is his only symptom. He denies painful erection, ejaculation.   He denies urination issues including weakness, frequency, urgency, weakness of stream, hesitancy. No concerns for STI.     The patient is currently on Eliquis for new onset A. Fib.     PSA is up to date   Lab Results   Component Value Date    PSA 1.46 01/07/2025     AUA: 7 Quality: 0 (delighted)       Current Outpatient Medications   Medication Sig Dispense Refill    apixaban ANTICOAGULANT (ELIQUIS) 5 MG tablet Take 1 tablet (5 mg) by mouth 2 times daily 180 tablet 4    levothyroxine (SYNTHROID/LEVOTHROID) 88 MCG tablet Take 1 tablet (88 mcg) by mouth daily 90 tablet 2     Allergies   Allergen Reactions    Amoxicillin-Pot Clavulanate Other (See Comments)     \"I was so weak and just felt like dying\"    Dilaudid [Hydromorphone] Nausea and Vomiting     \"ok with Percocet\"    Hydrocodone Nausea     \"also did not do anything for my pain\"     Past Medical History:   Diagnosis Date    History of ankle fracture     5 times left, 2 times right    Hypertension     Thyroid dysfunction      Past Surgical History:   Procedure Laterality Date    ANESTHESIA CARDIOVERSION N/A 9/14/2023    Procedure: Anesthesia cardioversion;  Surgeon: GENERIC ANESTHESIA PROVIDER;  Location: [facility] OR    ANKLE SURGERY  1984    COLONOSCOPY N/A 6/10/2016    Procedure: COLONOSCOPY;  Surgeon: Jarek Chaudhari MD;  Location:  GI    COLONOSCOPY N/A 2/9/2023    Procedure: COLONOSCOPY;  Surgeon: Vitaliy Huff DO;  Location:  GI    ESOPHAGOSCOPY, GASTROSCOPY, DUODENOSCOPY (EGD), COMBINED  5/24/2013    Procedure: COMBINED ESOPHAGOSCOPY, GASTROSCOPY, DUODENOSCOPY (EGD), BIOPSY SINGLE OR MULTIPLE;;  Surgeon: Timothy Jennings MD;  Location:  GI    LAPAROSCOPY " DIAGNOSTIC (GENERAL)  8/14/2013    Procedure: LAPAROSCOPY DIAGNOSTIC (GENERAL);  Diagnostic Laparoscopy;  Surgeon: Stu Arias MD;  Location: [facility] OR    SHOULDER SURGERY        Family History   Problem Relation Age of Onset    Hypertension Father     Prostate Cancer Father     Cancer Father         primary not known    Thyroid Disease Sister     Liver Disease Brother     Liver Disease Brother     Colon Cancer No family hx of      Social History     Socioeconomic History    Marital status:      Spouse name: None    Number of children: None    Years of education: None    Highest education level: None   Tobacco Use    Smoking status: Never    Smokeless tobacco: Never   Vaping Use    Vaping status: Never Used   Substance and Sexual Activity    Alcohol use: Yes     Alcohol/week: 6.0 standard drinks of alcohol     Types: 6 Shots of liquor per week    Drug use: No    Sexual activity: Yes     Partners: Female     Social Drivers of Health     Financial Resource Strain: Unknown (3/8/2024)    Financial Resource Strain     Within the past 12 months, have you or your family members you live with been unable to get utilities (heat, electricity) when it was really needed?: Patient declined   Food Insecurity: Unknown (3/8/2024)    Food Insecurity     Within the past 12 months, did you worry that your food would run out before you got money to buy more?: Patient declined     Within the past 12 months, did the food you bought just not last and you didn t have money to get more?: Patient declined   Transportation Needs: Unknown (3/8/2024)    Transportation Needs     Within the past 12 months, has lack of transportation kept you from medical appointments, getting your medicines, non-medical meetings or appointments, work, or from getting things that you need?: Patient declined   Physical Activity: Insufficiently Active (3/8/2024)    Exercise Vital Sign     Days of Exercise per Week: 7 days     Minutes of Exercise per  Session: 20 min   Stress: Patient Declined (3/8/2024)    [employer] - Occupational Stress Questionnaire     Feeling of Stress : Patient declined   Social Connections: Unknown (3/8/2024)    Social Connection and Isolation Panel [NHANES]     Frequency of Social Gatherings with Friends and Family: Patient declined   Interpersonal Safety: Low Risk  (1/7/2025)    Interpersonal Safety     Do you feel physically and emotionally safe where you currently live?: Yes     Within the past 12 months, have you been hit, slapped, kicked or otherwise physically hurt by someone?: No     Within the past 12 months, have you been humiliated or emotionally abused in other ways by your partner or ex-partner?: No   Housing Stability: Unknown (3/8/2024)    Housing Stability     Do you have housing? : Patient declined     Are you worried about losing your housing?: Patient declined       REVIEW OF SYSTEMS  =================  C: NEGATIVE for fever, chills, change in weight  I: NEGATIVE for worrisome rashes, moles or lesions  E/M: NEGATIVE for ear, mouth and throat problems  R: NEGATIVE for significant cough or SHORTNESS OF BREATH  CV:  NEGATIVE for chest pain, palpitations or peripheral edema  GI: NEGATIVE for nausea, abdominal pain, heartburn, or change in bowel habits  NEURO: NEGATIVE numbness/weakness  : see HPI  PSYCH: NEGATIVE depression/anxiety  LYmph: no new enlarged lymph nodes  Ortho: no new trauma/movements  Results for orders placed or performed in visit on 01/21/25   UA Macroscopic with reflex to Microscopic and Culture     Status: Normal    Specimen: Urine, Midstream   Result Value Ref Range    Color Urine Light Yellow Colorless, Straw, Light Yellow, Yellow    Appearance Urine Clear Clear    Glucose Urine Negative Negative mg/dL    Bilirubin Urine Negative Negative    Ketones Urine Negative Negative mg/dL    Specific Gravity Urine 1.020 1.003 - 1.035    Blood Urine Negative Negative    pH Urine 6.5  "5.0 - 7.0    Protein Albumin Urine Negative Negative mg/dL    Urobilinogen Urine Normal Normal, 2.0 mg/dL    Nitrite Urine Negative Negative    Leukocyte Esterase Urine Negative Negative    Narrative    Microscopic not indicated         Physical Exam:  BP (!) 139/90 (BP Location: Right arm, Patient Position: Sitting, Cuff Size: Adult Regular)   Pulse 75   Temp 97.2  F (36.2  C) (Temporal)   Resp 16   Ht 1.803 m (5' 11\")   Wt 100 kg (220 lb 9 oz)   SpO2 100%   BMI 30.76 kg/m     GENERAL: alert and no distress  EYES: Eyes grossly normal to inspection.  No discharge or erythema, or obvious scleral/conjunctival abnormalities.  RESP: No audible wheeze, cough, or visible cyanosis.    SKIN: Visible skin clear. No significant rash, abnormal pigmentation or lesions.  NEURO: Cranial nerves grossly intact.  Mentation and speech appropriate for age.  PSYCH: Appropriate affect, tone, and pace of words  PVR: 7 ml     Assessment/Plan:   1. Hematospermia (Primary)  It was my pleasure to speak with Mr. Durham in the office today in regards to his recent hematospermia. Specifically, we discussed that hematospermia is almost universally considered a benign condition. We also discussed that we typically do not recommend evaluation and that the symptoms will be self-limiting and resolve spontaneously over time. We also discussed that often the symptoms may recur and that most likely this is related to benign changes within the seminal vesicle itself.     2. Nocturia  Discussed etiology of nocturia including sleep apnea, BPH, bladder irritation or fluid consumption. Patient is not bothered by this symptom, reassurance.     3. Elevated blood pressure reading  Jigar to follow up with Primary Care provider regarding elevated blood pressure.             "

## 2025-01-21 NOTE — PROGRESS NOTES
Bladder Scan performed. 7ml  maximum residual urine detected after 3 scans. MD informed      Anais Damon MA on 1/21/2025 at 9:05 AM

## 2025-01-27 ENCOUNTER — LAB (OUTPATIENT)
Dept: LAB | Facility: CLINIC | Age: 60
End: 2025-01-27
Payer: COMMERCIAL

## 2025-01-27 ENCOUNTER — HOSPITAL ENCOUNTER (OUTPATIENT)
Dept: CARDIOLOGY | Facility: CLINIC | Age: 60
Discharge: HOME | End: 2025-01-27
Attending: INTERNAL MEDICINE | Admitting: INTERNAL MEDICINE
Payer: COMMERCIAL

## 2025-01-27 DIAGNOSIS — R07.2 PRECORDIAL PAIN: ICD-10-CM

## 2025-01-27 DIAGNOSIS — I25.10 CORONARY ARTERY DISEASE INVOLVING NATIVE CORONARY ARTERY OF NATIVE HEART, UNSPECIFIED WHETHER ANGINA PRESENT: ICD-10-CM

## 2025-01-27 DIAGNOSIS — E78.00 HYPERCHOLESTEROLEMIA: ICD-10-CM

## 2025-01-27 LAB
ALT SERPL W P-5'-P-CCNC: 18 U/L (ref 0–70)
CHOLEST SERPL-MCNC: 185 MG/DL
FASTING STATUS PATIENT QL REPORTED: YES
HDLC SERPL-MCNC: 42 MG/DL
LDLC SERPL CALC-MCNC: 129 MG/DL
NONHDLC SERPL-MCNC: 143 MG/DL
TRIGL SERPL-MCNC: 71 MG/DL

## 2025-01-27 PROCEDURE — 93321 DOPPLER ECHO F-UP/LMTD STD: CPT | Mod: 26 | Performed by: INTERNAL MEDICINE

## 2025-01-27 PROCEDURE — 93016 CV STRESS TEST SUPVJ ONLY: CPT | Performed by: INTERNAL MEDICINE

## 2025-01-27 PROCEDURE — 93325 DOPPLER ECHO COLOR FLOW MAPG: CPT | Mod: 26 | Performed by: INTERNAL MEDICINE

## 2025-01-27 PROCEDURE — 255N000002 HC RX 255 OP 636: Performed by: INTERNAL MEDICINE

## 2025-01-27 PROCEDURE — 93325 DOPPLER ECHO COLOR FLOW MAPG: CPT | Mod: TC

## 2025-01-27 PROCEDURE — C8928 TTE W OR W/O FOL W/CON,STRES: HCPCS

## 2025-01-27 PROCEDURE — 93350 STRESS TTE ONLY: CPT | Mod: 26 | Performed by: INTERNAL MEDICINE

## 2025-01-27 PROCEDURE — 80061 LIPID PANEL: CPT

## 2025-01-27 PROCEDURE — 93018 CV STRESS TEST I&R ONLY: CPT | Performed by: INTERNAL MEDICINE

## 2025-01-27 PROCEDURE — 84460 ALANINE AMINO (ALT) (SGPT): CPT

## 2025-01-27 PROCEDURE — 36415 COLL VENOUS BLD VENIPUNCTURE: CPT

## 2025-01-27 RX ADMIN — HUMAN ALBUMIN MICROSPHERES AND PERFLUTREN 2 ML: 10; .22 INJECTION, SOLUTION INTRAVENOUS at 08:58

## 2025-01-28 ENCOUNTER — TELEPHONE (OUTPATIENT)
Dept: CARDIOLOGY | Facility: CLINIC | Age: 60
End: 2025-01-28
Payer: COMMERCIAL

## 2025-01-28 NOTE — TELEPHONE ENCOUNTER
Unable to reach patient by phone. Left message with call back number [phone] also stating will send [employer] message with results.     ----- Message from Salvador Lee sent at 1/27/2025  4:59 PM CST -----  Regarding: stress echo  Souleymane Pinto  Please inform the patient his stress echocardiogram looks great!! Good news. He has a follow up visit already scheduled. Thanks

## 2025-02-03 NOTE — PROGRESS NOTES
~Cardiology Clinic Visit~    Primary Cardiologist: Dr. Lee    History of Present Illness    Dom Durham is a pleasant 58 year old patient with past medical history significant for:     New paroxysmal atrial fibrillation with RVR  [id number] Score 1 (hypertension  Cardiomyopathy  Hypertension  Obesity  EtOH dependance   Hyperlipidemia  Hypothyroidism  Untreated sleep apnea     The patient was admitted to [facility] on 9/14/2023 as a transfer from [facility] with weeping ulcers of his legs.  He was found to be in atrial fibrillation with RVR.  The patient's wife had passed away in July and he had been drinking more alcohol than usual.  He underwent JUANY guided cardioversion.      His echocardiogram while in AF with RVR showed EF of 30 to 35% with mild dilatation of the LV.  He was placed on GDMT with lisinopril 2.5 mg daily, metoprolol XL 12.5 mg twice daily and on anticoagulation with apixaban 5 mg twice daily.  Repeat echocardiogram 10/2023 showed LVEF improved to 50 to 55%.  We was not on his cardiac medications because he ran out.     At that time, his EKG showed recurrent AF.  Given elevated rates, we was transported to [facility] for JUANY/DCCV.  Again, he was asymptomatic to the arrhythmia.  Eliquis was resumed and Metoprolol increased to 50 mg daily.      In November 2023 and January 2024, patient was doing well and back in sinus rhythm, confirmed on EKG.      On 11/30/2024, the patient developed the abrupt onset of recurrent atrial fibrillation accompanied by mild chest discomfort and presented to the [facility] emergency room.  Lab panel overall unremarkable.  He underwent successful DCCV back to a sinus rhythm and has been free of symptoms since then.      He remains abstinent from alcohol, follows a prudent diet, and exercises regularly. His blood pressures have remained optimal without recurrent palpitation.  Given chest discomfort with arrhythmia, stress testing recommended.  He has recovered  his LV and AF stable, therefore Lisinopril and Metoprolol discontinued in January 2025.    Diagnostics:    Stress echocardiogram 1/27/25: Exercise stress echocardiogram is negative for ischemia or infarction at an exercise capacity of 14 METS and 92% of target heart rate.  There was no chest pain or significant ST changes with exercise. The Duke treadmill score was low risk ( >5 Del Castillo score).    Interval 02/05/25    Patient doing well today.  Discussed again that he wants to come off as many medications as possible.  Testing reviewed.  Labs reviewed - lipid panel trending down without statin. Again today, he does not want to be on statin.  He wants to discontinue eliquis.  He remains active, eats healthy and watches his weight.  __________________________________________________________________         Assessment and Impression:     Atrial fibrillation, new diagnosis (asymptomatic)  [id number] score of 1, on Eliquis.  Recent EKG showing stable SR.    Denies AF recurrence.     Cardiomyopathy  Etiology: Likely tachycardia mediated  LVEF 30 to 35% on 9/14/2023 and recovered to 55-60% January 2025.  Weight stable.  No HF symptoms.     Hypertension         Recommendations and Plan:     Stop Eliquis.  Resume ASA 81 mg daily.  Discussed call to clinic if concerns for AF recurrence and would need to resume Eliquis.  He has questions about stopping Levothyroxine, which I have asked him to discuss with his PCP.  ANA ROSA follow up in 6-months with EKG at time of visit.  __________________________________________________________________    Thank you for the opportunity to participate in this pleasant patient's care.    We would be happy to see this patient sooner for any concerns in the meantime.    KASIE Luther, CNP   Nurse Practitioner  [facility] [place] [facility]    Today's clinic visit entailed:  The following tests were independently interpreted by me as noted in my documentation: EKG, labs, stress  "echo  Ordering of each unique test  Prescription drug management  The level of medical decision making during this visit was of moderate complexity.  Review of the result(s) of each unique test - cardiac testing, cardiac imaging, labs  Care everywhere reviewed for additional records to facilitate comprehensive patient care.    Orders this Visit:  Orders Placed This Encounter   Procedures    Follow-Up with Cardiology- ANA ROSA    EKG 12-lead complete w/read - Clinics (future- to be scheduled)     Orders Placed This Encounter   Medications    aspirin 81 MG EC tablet     Sig: Take 1 tablet (81 mg) by mouth daily.     Medications Discontinued During This Encounter   Medication Reason    apixaban ANTICOAGULANT (ELIQUIS) 5 MG tablet      Encounter Diagnoses   Name Primary?    Hypercholesterolemia     Precordial pain     Coronary artery disease involving native coronary artery of native heart, unspecified whether angina present     Paroxysmal atrial fibrillation (H) Yes     CURRENT MEDICATIONS:  Current Outpatient Medications   Medication Sig Dispense Refill    aspirin 81 MG EC tablet Take 1 tablet (81 mg) by mouth daily.      levothyroxine (SYNTHROID/LEVOTHROID) 88 MCG tablet Take 1 tablet (88 mcg) by mouth daily 90 tablet 2     ALLERGIES     Allergies   Allergen Reactions    Amoxicillin-Pot Clavulanate Other (See Comments)     \"I was so weak and just felt like dying\"    Dilaudid [Hydromorphone] Nausea and Vomiting     \"ok with Percocet\"    Hydrocodone Nausea     \"also did not do anything for my pain\"     PAST MEDICAL, SURGICAL, FAMILY, SOCIAL HISTORY:  History was reviewed and updated as needed, see medical record.    Review of Systems:  A 10-point Review Of Systems is otherwise normal except for that which is noted in the HPI and interval summary.    Physical Exam:    Vitals: /84 (BP Location: Right arm, Patient Position: Sitting, Cuff Size: Adult Regular)   Pulse 78   Resp 16   Ht 1.803 m (5' 11\")   Wt 99.8 kg " (220 lb)   SpO2 97%   BMI 30.68 kg/m    Constitutional: Appears stated age, well nourished, NAD.  Neck: Supple. Carotid pulses full and equal.   Respiratory: Non-labored. Lungs CTAB.  Cardiovascular: RRR, normal S1 and S2. No M/G/R.  No edema.  GI: Soft, non-distended, non-tender.  Skin: Warm and dry.   Musculoskeletal/Extremities: Symmetrical movement.  Neurologic: No gross focal deficits. Alert, awake.  Psychiatric: Affect appropriate. Mentation normal.    Recent Lab Results:  LIPID RESULTS:  Lab Results   Component Value Date    CHOL 185 01/27/2025    CHOL 195 08/24/2015    HDL 42 01/27/2025    HDL 40 (L) 08/24/2015     (H) 01/27/2025     (H) 08/24/2015    TRIG 71 01/27/2025    TRIG 65 08/24/2015    CHOLHDLRATIO 4.9 08/24/2015     LIVER ENZYME RESULTS:  Lab Results   Component Value Date    AST 26 11/30/2024    AST 44 10/09/2017    ALT 18 01/27/2025    ALT 60 10/09/2017     CBC RESULTS:  Lab Results   Component Value Date    WBC 9.0 01/07/2025    WBC 7.5 10/05/2017    RBC 4.68 01/07/2025    RBC 5.17 10/05/2017    HGB 14.2 01/07/2025    HGB 16.2 10/05/2017    HCT 43.0 01/07/2025    HCT 49.5 10/05/2017    MCV 92 01/07/2025    MCV 96 10/05/2017    MCH 30.3 01/07/2025    MCH 31.3 10/05/2017    MCHC 33.0 01/07/2025    MCHC 32.7 10/05/2017    RDW 12.4 01/07/2025    RDW 12.7 10/05/2017     01/07/2025     10/05/2017     BMP RESULTS:  Lab Results   Component Value Date     01/07/2025     10/09/2017    POTASSIUM 5.1 01/07/2025    POTASSIUM 4.4 10/08/2021    POTASSIUM 4.0 10/09/2017    CHLORIDE 104 01/07/2025    CHLORIDE 105 10/08/2021    CHLORIDE 105 10/09/2017    CO2 24 01/07/2025    CO2 25 10/08/2021    CO2 26 10/09/2017    ANIONGAP 12 01/07/2025    ANIONGAP 6 10/08/2021    ANIONGAP 8 10/09/2017    GLC 94 01/07/2025     (H) 10/08/2021    GLC 90 10/09/2017    BUN 20.0 01/07/2025    BUN 18 10/08/2021    BUN 11 10/09/2017    CR 1.00 01/07/2025    CR 0.85 10/09/2017     "GFRESTIMATED 86 01/07/2025    GFRESTIMATED >90 10/09/2017    GFRESTBLACK >90 10/09/2017    SHELIA 9.7 01/07/2025    SHELIA 9.5 10/09/2017      A1C RESULTS:  No results found for: \"A1C\"  INR RESULTS:  Lab Results   Component Value Date    INR 1.30 (H) 09/14/2023                     "

## 2025-02-05 ENCOUNTER — OFFICE VISIT (OUTPATIENT)
Dept: CARDIOLOGY | Facility: CLINIC | Age: 60
End: 2025-02-05
Payer: COMMERCIAL

## 2025-02-05 VITALS
BODY MASS INDEX: 30.8 KG/M2 | RESPIRATION RATE: 16 BRPM | HEART RATE: 78 BPM | DIASTOLIC BLOOD PRESSURE: 84 MMHG | SYSTOLIC BLOOD PRESSURE: 126 MMHG | HEIGHT: 71 IN | OXYGEN SATURATION: 97 % | WEIGHT: 220 LBS

## 2025-02-05 DIAGNOSIS — I48.0 PAROXYSMAL ATRIAL FIBRILLATION (H): Primary | ICD-10-CM

## 2025-02-05 DIAGNOSIS — I25.10 CORONARY ARTERY DISEASE INVOLVING NATIVE CORONARY ARTERY OF NATIVE HEART, UNSPECIFIED WHETHER ANGINA PRESENT: ICD-10-CM

## 2025-02-05 DIAGNOSIS — E78.00 HYPERCHOLESTEROLEMIA: ICD-10-CM

## 2025-02-05 DIAGNOSIS — R07.2 PRECORDIAL PAIN: ICD-10-CM

## 2025-02-05 PROCEDURE — 99214 OFFICE O/P EST MOD 30 MIN: CPT | Performed by: NURSE PRACTITIONER

## 2025-02-05 RX ORDER — ASPIRIN 81 MG/1
81 TABLET ORAL DAILY
COMMUNITY
Start: 2025-02-05

## 2025-02-05 ASSESSMENT — PAIN SCALES - GENERAL: PAINLEVEL_OUTOF10: NO PAIN (0)

## 2025-02-05 NOTE — LETTER
2/5/2025    Aftab Guevara PA-C  [address] [facility] [address]    RE: Dom Durham       Dear Colleague,     I had the pleasure of seeing Dom Durham in the [facility].              ~Cardiology Clinic Visit~    Primary Cardiologist: Dr. Lee    History of Present Illness    Dom Durham is a pleasant 58 year old patient with past medical history significant for:     New paroxysmal atrial fibrillation with RVR  [id number] Score 1 (hypertension  Cardiomyopathy  Hypertension  Obesity  EtOH dependance   Hyperlipidemia  Hypothyroidism  Untreated sleep apnea     The patient was admitted to [facility] on 9/14/2023 as a transfer from [facility] with weeping ulcers of his legs.  He was found to be in atrial fibrillation with RVR.  The patient's wife had passed away in July and he had been drinking more alcohol than usual.  He underwent JUANY guided cardioversion.      His echocardiogram while in AF with RVR showed EF of 30 to 35% with mild dilatation of the LV.  He was placed on GDMT with lisinopril 2.5 mg daily, metoprolol XL 12.5 mg twice daily and on anticoagulation with apixaban 5 mg twice daily.  Repeat echocardiogram 10/2023 showed LVEF improved to 50 to 55%.  We was not on his cardiac medications because he ran out.     At that time, his EKG showed recurrent AF.  Given elevated rates, we was transported to [facility] for JUANY/DCCV.  Again, he was asymptomatic to the arrhythmia.  Eliquis was resumed and Metoprolol increased to 50 mg daily.      In November 2023 and January 2024, patient was doing well and back in sinus rhythm, confirmed on EKG.      On 11/30/2024, the patient developed the abrupt onset of recurrent atrial fibrillation accompanied by mild chest discomfort and presented to the [facility] emergency room.  Lab panel overall unremarkable.  He underwent successful DCCV back to a sinus rhythm and has been free of symptoms since then.      He remains abstinent from alcohol,  follows a prudent diet, and exercises regularly. His blood pressures have remained optimal without recurrent palpitation.  Given chest discomfort with arrhythmia, stress testing recommended.  He has recovered his LV and AF stable, therefore Lisinopril and Metoprolol discontinued in January 2025.    Diagnostics:    Stress echocardiogram 1/27/25: Exercise stress echocardiogram is negative for ischemia or infarction at an exercise capacity of 14 METS and 92% of target heart rate.  There was no chest pain or significant ST changes with exercise. The Duke treadmill score was low risk ( >5 Del Castillo score).    Interval 02/05/25    Patient doing well today.  Discussed again that he wants to come off as many medications as possible.  Testing reviewed.  Labs reviewed - lipid panel trending down without statin. Again today, he does not want to be on statin.  He wants to discontinue eliquis.  He remains active, eats healthy and watches his weight.  __________________________________________________________________         Assessment and Impression:     Atrial fibrillation, new diagnosis (asymptomatic)  [id number] score of 1, on Eliquis.  Recent EKG showing stable SR.    Denies AF recurrence.     Cardiomyopathy  Etiology: Likely tachycardia mediated  LVEF 30 to 35% on 9/14/2023 and recovered to 55-60% January 2025.  Weight stable.  No HF symptoms.     Hypertension         Recommendations and Plan:     Stop Eliquis.  Resume ASA 81 mg daily.  Discussed call to clinic if concerns for AF recurrence and would need to resume Eliquis.  He has questions about stopping Levothyroxine, which I have asked him to discuss with his PCP.  ANA ROSA follow up in 6-months with EKG at time of visit.  __________________________________________________________________    Thank you for the opportunity to participate in this pleasant patient's care.    We would be happy to see this patient sooner for any concerns in the meantime.    KASIE Luther,  "CNP   Nurse Practitioner  [facility]    Today's clinic visit entailed:  The following tests were independently interpreted by me as noted in my documentation: EKG, labs, stress echo  Ordering of each unique test  Prescription drug management  The level of medical decision making during this visit was of moderate complexity.  Review of the result(s) of each unique test - cardiac testing, cardiac imaging, labs  Care everywhere reviewed for additional records to facilitate comprehensive patient care.    Orders this Visit:  Orders Placed This Encounter   Procedures     Follow-Up with Cardiology- ANA ROSA     EKG 12-lead complete w/read - Clinics (future- to be scheduled)     Orders Placed This Encounter   Medications     aspirin 81 MG EC tablet     Sig: Take 1 tablet (81 mg) by mouth daily.     Medications Discontinued During This Encounter   Medication Reason     apixaban ANTICOAGULANT (ELIQUIS) 5 MG tablet      Encounter Diagnoses   Name Primary?     Hypercholesterolemia      Precordial pain      Coronary artery disease involving native coronary artery of native heart, unspecified whether angina present      Paroxysmal atrial fibrillation (H) Yes     CURRENT MEDICATIONS:  Current Outpatient Medications   Medication Sig Dispense Refill     aspirin 81 MG EC tablet Take 1 tablet (81 mg) by mouth daily.       levothyroxine (SYNTHROID/LEVOTHROID) 88 MCG tablet Take 1 tablet (88 mcg) by mouth daily 90 tablet 2     ALLERGIES     Allergies   Allergen Reactions     Amoxicillin-Pot Clavulanate Other (See Comments)     \"I was so weak and just felt like dying\"     Dilaudid [Hydromorphone] Nausea and Vomiting     \"ok with Percocet\"     Hydrocodone Nausea     \"also did not do anything for my pain\"     PAST MEDICAL, SURGICAL, FAMILY, SOCIAL HISTORY:  History was reviewed and updated as needed, see medical record.    Review of Systems:  A 10-point Review Of Systems is otherwise normal except for that which is noted in " "the HPI and interval summary.    Physical Exam:    Vitals: /84 (BP Location: Right arm, Patient Position: Sitting, Cuff Size: Adult Regular)   Pulse 78   Resp 16   Ht 1.803 m (5' 11\")   Wt 99.8 kg (220 lb)   SpO2 97%   BMI 30.68 kg/m    Constitutional: Appears stated age, well nourished, NAD.  Neck: Supple. Carotid pulses full and equal.   Respiratory: Non-labored. Lungs CTAB.  Cardiovascular: RRR, normal S1 and S2. No M/G/R.  No edema.  GI: Soft, non-distended, non-tender.  Skin: Warm and dry.   Musculoskeletal/Extremities: Symmetrical movement.  Neurologic: No gross focal deficits. Alert, awake.  Psychiatric: Affect appropriate. Mentation normal.    Recent Lab Results:  LIPID RESULTS:  Lab Results   Component Value Date    CHOL 185 01/27/2025    CHOL 195 08/24/2015    HDL 42 01/27/2025    HDL 40 (L) 08/24/2015     (H) 01/27/2025     (H) 08/24/2015    TRIG 71 01/27/2025    TRIG 65 08/24/2015    CHOLHDLRATIO 4.9 08/24/2015     LIVER ENZYME RESULTS:  Lab Results   Component Value Date    AST 26 11/30/2024    AST 44 10/09/2017    ALT 18 01/27/2025    ALT 60 10/09/2017     CBC RESULTS:  Lab Results   Component Value Date    WBC 9.0 01/07/2025    WBC 7.5 10/05/2017    RBC 4.68 01/07/2025    RBC 5.17 10/05/2017    HGB 14.2 01/07/2025    HGB 16.2 10/05/2017    HCT 43.0 01/07/2025    HCT 49.5 10/05/2017    MCV 92 01/07/2025    MCV 96 10/05/2017    MCH 30.3 01/07/2025    MCH 31.3 10/05/2017    MCHC 33.0 01/07/2025    MCHC 32.7 10/05/2017    RDW 12.4 01/07/2025    RDW 12.7 10/05/2017     01/07/2025     10/05/2017     BMP RESULTS:  Lab Results   Component Value Date     01/07/2025     10/09/2017    POTASSIUM 5.1 01/07/2025    POTASSIUM 4.4 10/08/2021    POTASSIUM 4.0 10/09/2017    CHLORIDE 104 01/07/2025    CHLORIDE 105 10/08/2021    CHLORIDE 105 10/09/2017    CO2 24 01/07/2025    CO2 25 10/08/2021    CO2 26 10/09/2017    ANIONGAP 12 01/07/2025    ANIONGAP 6 10/08/2021    " "ANIONGAP 8 10/09/2017    GLC 94 01/07/2025     (H) 10/08/2021    GLC 90 10/09/2017    BUN 20.0 01/07/2025    BUN 18 10/08/2021    BUN 11 10/09/2017    CR 1.00 01/07/2025    CR 0.85 10/09/2017    GFRESTIMATED 86 01/07/2025    GFRESTIMATED >90 10/09/2017    GFRESTBLACK >90 10/09/2017    SHELIA 9.7 01/07/2025    SHELIA 9.5 10/09/2017      A1C RESULTS:  No results found for: \"A1C\"  INR RESULTS:  Lab Results   Component Value Date    INR 1.30 (H) 09/14/2023                     Thank you for allowing me to participate in the care of your patient.      Sincerely,     KASIE Luther [facility]  cc:   Aftab Guevara PA-C  [address] [place] [id number]      "

## 2025-02-05 NOTE — PATIENT INSTRUCTIONS
Stop Eliquis.  Start Aspirin 81 mg daily.   over the counter.  Follow up in 6-months with EKG.  We discussed if you have recurrence of atrial fibrillation at home, you will need to resume Eliquis 5 mg twice daily and make an appointment to see us sooner in cardiology clinic.

## 2025-03-17 ENCOUNTER — OFFICE VISIT (OUTPATIENT)
Dept: FAMILY MEDICINE | Facility: OTHER | Age: 60
End: 2025-03-17
Attending: PHYSICIAN ASSISTANT
Payer: COMMERCIAL

## 2025-03-17 VITALS
RESPIRATION RATE: 16 BRPM | TEMPERATURE: 97.2 F | HEIGHT: 71 IN | DIASTOLIC BLOOD PRESSURE: 78 MMHG | BODY MASS INDEX: 29.05 KG/M2 | OXYGEN SATURATION: 96 % | WEIGHT: 207.5 LBS | HEART RATE: 75 BPM | SYSTOLIC BLOOD PRESSURE: 122 MMHG

## 2025-03-17 DIAGNOSIS — E03.9 HYPOTHYROIDISM, UNSPECIFIED TYPE: ICD-10-CM

## 2025-03-17 DIAGNOSIS — I50.9 CONGESTIVE HEART FAILURE, UNSPECIFIED HF CHRONICITY, UNSPECIFIED HEART FAILURE TYPE (H): ICD-10-CM

## 2025-03-17 DIAGNOSIS — Z00.00 ROUTINE GENERAL MEDICAL EXAMINATION AT A HEALTH CARE FACILITY: Primary | ICD-10-CM

## 2025-03-17 LAB
CREAT UR-MCNC: 138.9 MG/DL
MICROALBUMIN UR-MCNC: <12 MG/L
MICROALBUMIN/CREAT UR: NORMAL MG/G{CREAT}
T4 FREE SERPL-MCNC: 1.22 NG/DL (ref 0.9–1.7)
TSH SERPL DL<=0.005 MIU/L-ACNC: 4.28 UIU/ML (ref 0.3–4.2)

## 2025-03-17 PROCEDURE — 3074F SYST BP LT 130 MM HG: CPT | Performed by: PHYSICIAN ASSISTANT

## 2025-03-17 PROCEDURE — 82043 UR ALBUMIN QUANTITATIVE: CPT | Performed by: PHYSICIAN ASSISTANT

## 2025-03-17 PROCEDURE — 99396 PREV VISIT EST AGE 40-64: CPT | Performed by: PHYSICIAN ASSISTANT

## 2025-03-17 PROCEDURE — 82570 ASSAY OF URINE CREATININE: CPT | Performed by: PHYSICIAN ASSISTANT

## 2025-03-17 PROCEDURE — 3078F DIAST BP <80 MM HG: CPT | Performed by: PHYSICIAN ASSISTANT

## 2025-03-17 PROCEDURE — 1126F AMNT PAIN NOTED NONE PRSNT: CPT | Performed by: PHYSICIAN ASSISTANT

## 2025-03-17 PROCEDURE — 36415 COLL VENOUS BLD VENIPUNCTURE: CPT | Performed by: PHYSICIAN ASSISTANT

## 2025-03-17 PROCEDURE — 84439 ASSAY OF FREE THYROXINE: CPT | Performed by: PHYSICIAN ASSISTANT

## 2025-03-17 PROCEDURE — 84443 ASSAY THYROID STIM HORMONE: CPT | Performed by: PHYSICIAN ASSISTANT

## 2025-03-17 RX ORDER — LEVOTHYROXINE SODIUM 88 UG/1
88 TABLET ORAL DAILY
Qty: 90 TABLET | Refills: 3 | Status: SHIPPED | OUTPATIENT
Start: 2025-03-17

## 2025-03-17 SDOH — HEALTH STABILITY: PHYSICAL HEALTH: ON AVERAGE, HOW MANY DAYS PER WEEK DO YOU ENGAGE IN MODERATE TO STRENUOUS EXERCISE (LIKE A BRISK WALK)?: 7 DAYS

## 2025-03-17 ASSESSMENT — SOCIAL DETERMINANTS OF HEALTH (SDOH): HOW OFTEN DO YOU GET TOGETHER WITH FRIENDS OR RELATIVES?: PATIENT DECLINED

## 2025-03-17 ASSESSMENT — PAIN SCALES - GENERAL: PAINLEVEL_OUTOF10: NO PAIN (0)

## 2025-03-17 NOTE — PROGRESS NOTES
Preventive Care Visit  [facility]  Aftab Guevara PA-C, Family Medicine  Mar 17, 2025    Assessment & Plan     Routine general medical examination at a health care facility  Patient has been doing well. He informs me that he plans to [place] He says that he and his spouse have been reading more books about the body including heart, liver and thyroid. He says that one of his sisters stopped her levothyroxine without issue and the other is tapering to every 3 days. We discussed what levothyroxine does in the body and the half life of the medication in relation to when measurable levels would change. He estimates that he has been taking half of his dose for the past month or so. We can check this today, pending results can adjust dose if necessary.     Patient informs me that he had a very poor experience with covid and shingles vaccines; caused him to feel ill for a week. Since this time he has sworn off of all vaccines. We had a discussion that not all vaccines are made the same and that many of the vaccines have been around for decades with proven benefit towards reduction in disease rates as well as safety among users of both genders and all ages. I did review the types of exposures which tetanus, pertussis or even measles, given recent outbreak, could occur. Encouraged patient to avoid sources of information such as from politicians, foxnews or political podcasts. Reviewed importance of getting tdap within 24 hours of exposure. Reviewed healthy lifestyle recommendations with the patient. Reviewed health maintenance and updated per the patient's preferences.    Declined all vaccines today.   - Albumin Random Urine Quantitative with Creat Ratio; Future  - TSH with free T4 reflex; Future  - Albumin Random Urine Quantitative with Creat Ratio  - TSH with free T4 reflex    Congestive heart failure, unspecified HF chronicity, unspecified heart  failure type (H)  Patient has been following with cardiologist who has been tracking his atrial fibrillation and cardiomyopathy. At time of most recent visit, 02/25/25, they felt that he was safe to stop his eliquis and continue with aspirin. His EF was noted to have improved from 30-35% to 55-60% between 09/2023 and 01/2025.     Patient has been advised of split billing requirements and indicates understanding: Yes        Counseling  Appropriate preventive services were addressed with this patient via screening, questionnaire, or discussion as appropriate for fall prevention, nutrition, physical activity, Tobacco-use cessation, social engagement, weight loss and cognition.  Checklist reviewing preventive services available has been given to the patient.  Reviewed patient's diet, addressing concerns and/or questions.     Prashanth Kimball is a 60 year old, presenting for the following:  Physical        3/17/2025     6:55 AM   Additional Questions   Roomed by Amrita GOLDEN   Accompanied by self         3/17/2025     6:55 AM   Patient Reported Additional Medications   Patient reports taking the following new medications Thyroid med he is only taking 0.5 tablet because he wants to eventually quit the medicine        HPI       Advance Care Planning  Patient does not have a Health Care Directive: Discussed advance care planning with patient; however, patient declined at this time.      3/17/2025   General Health   How would you rate your overall physical health? Good   Feel stress (tense, anxious, or unable to sleep) Patient declined         3/17/2025   Nutrition   Three or more servings of calcium each day? Yes   Diet: Low salt   How many servings of fruit and vegetables per day? (!) 2-3   How many sweetened beverages each day? 0-1         3/17/2025   Exercise   Days per week of moderate/strenous exercise 7 days         3/17/2025   Social Factors   Frequency of gathering with friends or relatives Patient declined   Worry food  won't last until get money to buy more Patient declined   Food not last or not have enough money for food? Patient declined   Do you have housing? (Housing is defined as stable permanent housing and does not include staying ouside in a car, in a tent, in an abandoned building, in an overnight shelter, or couch-surfing.) Patient declined   Are you worried about losing your housing? Patient declined   Lack of transportation? Patient declined   Unable to get utilities (heat,electricity)? Patient declined         3/17/2025   Fall Risk   Fallen 2 or more times in the past year? No   Trouble with walking or balance? No          3/17/2025   Dental   Dentist two times every year? (!) DECLINE           3/8/2024   TB Screening   Were you born outside of the [place]? (!) DECLINE       Today's PHQ-2 Score:       1/7/2025    11:02 AM   PHQ-2 ( 1999 [employer])   Q1: Little interest or pleasure in doing things 0   Q2: Feeling down, depressed or hopeless 0   PHQ-2 Score 0    Q1: Little interest or pleasure in doing things Not at all   Q2: Feeling down, depressed or hopeless Not at all   PHQ-2 Score 0       Patient-reported         3/17/2025   Substance Use   Alcohol more than 3/day or more than 7/wk Not Applicable   Do you use any other substances recreationally? (!) DECLINE     Social History     Tobacco Use    Smoking status: Never    Smokeless tobacco: Never   Vaping Use    Vaping status: Never Used   Substance Use Topics    Alcohol use: Yes     Alcohol/week: 6.0 standard drinks of alcohol     Types: 6 Shots of liquor per week    Drug use: No         3/17/2025   STI Screening   New sexual partner(s) since last STI/HIV test? (!) DECLINE   Last PSA:   Prostate Specific Antigen Screen   Date Value Ref Range Status   01/07/2025 1.46 0.00 - 4.50 ng/mL Final     ASCVD Risk   The 10-year ASCVD risk score (Margot LEMUS, et al., 2019) is: 9.8%    Values used to calculate the score:      Age: 60 years      Sex: Male      Is Non-  ": No      Diabetic: No      Tobacco smoker: No      Systolic Blood Pressure: 122 mmHg      Is BP treated: No      HDL Cholesterol: 42 mg/dL      Total Cholesterol: 185 mg/dL     Reviewed and updated as needed this visit by Provider    Past Medical History:   Diagnosis Date    History of ankle fracture     5 times left, 2 times right    Hypertension     Thyroid dysfunction          Review of Systems  Constitutional, HEENT, cardiovascular, pulmonary, gi and gu systems are negative, except as otherwise noted.     Objective    Exam  /78   Pulse 75   Temp 97.2  F (36.2  C) (Temporal)   Resp 16   Ht 1.803 m (5' 11\")   Wt 94.1 kg (207 lb 8 oz)   SpO2 96%   BMI 28.94 kg/m     Estimated body mass index is 28.94 kg/m  as calculated from the following:    Height as of this encounter: 1.803 m (5' 11\").    Weight as of this encounter: 94.1 kg (207 lb 8 oz).    Physical Exam  GENERAL: alert and no distress  EYES: Eyes grossly normal to inspection, PERRL and conjunctivae and sclerae normal  HENT: ear canals and TM's normal, nose and mouth without ulcers or lesions  NECK: no adenopathy, no asymmetry, masses, or scars  RESP: lungs clear to auscultation - no rales, rhonchi or wheezes  CV: regular rate and rhythm, normal S1 S2, no S3 or S4, no murmur, click or rub, no peripheral edema  ABDOMEN: soft, nontender, no hepatosplenomegaly, no masses and bowel sounds normal  MS: no gross musculoskeletal defects noted, no edema  NEURO: Normal strength and tone, mentation intact and speech normal  PSYCH: mentation appears normal, affect normal/bright        Signed Electronically by: Aftab Guevara PA-C    "

## 2025-03-17 NOTE — PATIENT INSTRUCTIONS
Patient Education   Preventive Care Advice   This is general advice given by our system to help you stay healthy. However, your care team may have specific advice just for you. Please talk to your care team about your preventive care needs.  Nutrition  Eat 5 or more servings of fruits and vegetables each day.  Try wheat bread, brown rice and whole grain pasta (instead of white bread, rice, and pasta).  Get enough calcium and vitamin D. Check the label on foods and aim for 100% of the RDA (recommended daily allowance).  Lifestyle  Exercise at least 150 minutes each week  (30 minutes a day, 5 days a week).  Do muscle strengthening activities 2 days a week. These help control your weight and prevent disease.  No smoking.  Wear sunscreen to prevent skin cancer.  Have a dental exam and cleaning every 6 months.  Yearly exams  See your health care team every year to talk about:  Any changes in your health.  Any medicines your care team has prescribed.  Preventive care, family planning, and ways to prevent chronic diseases.  Shots (vaccines)   HPV shots (up to age 26), if you've never had them before.  Hepatitis B shots (up to age 59), if you've never had them before.  COVID-19 shot: Get this shot when it's due.  Flu shot: Get a flu shot every year.  Tetanus shot: Get a tetanus shot every 10 years.  Pneumococcal, hepatitis A, and RSV shots: Ask your care team if you need these based on your risk.  Shingles shot (for age 50 and up)  General health tests  Diabetes screening:  Starting at age 35, Get screened for diabetes at least every 3 years.  If you are younger than age 35, ask your care team if you should be screened for diabetes.  Cholesterol test: At age 39, start having a cholesterol test every 5 years, or more often if advised.  Bone density scan (DEXA): At age 50, ask your care team if you should have this scan for osteoporosis (brittle bones).  Hepatitis C: Get tested at least once in your life.  STIs (sexually  transmitted infections)  Before age 24: Ask your care team if you should be screened for STIs.  After age 24: Get screened for STIs if you're at risk. You are at risk for STIs (including HIV) if:  You are sexually active with more than one person.  You don't use condoms every time.  You or a partner was diagnosed with a sexually transmitted infection.  If you are at risk for HIV, ask about PrEP medicine to prevent HIV.  Get tested for HIV at least once in your life, whether you are at risk for HIV or not.  Cancer screening tests  Cervical cancer screening: If you have a cervix, begin getting regular cervical cancer screening tests starting at age 21.  Breast cancer scan (mammogram): If you've ever had breasts, begin having regular mammograms starting at age 40. This is a scan to check for breast cancer.  Colon cancer screening: It is important to start screening for colon cancer at age 45.  Have a colonoscopy test every 10 years (or more often if you're at risk) Or, ask your provider about stool tests like a FIT test every year or Cologuard test every 3 years.  To learn more about your testing options, visit:   .  For help making a decision, visit:   https://bit.ly/no15714.  Prostate cancer screening test: If you have a prostate, ask your care team if a prostate cancer screening test (PSA) at age 55 is right for you.  Lung cancer screening: If you are a current or former smoker ages 50 to 80, ask your care team if ongoing lung cancer screenings are right for you.  For informational purposes only. Not to replace the advice of your health care provider. Copyright   2023 [employer]. All rights reserved. Clinically reviewed by the [facility]. iMOS[employer] 187062 - REV 01/24.

## 2025-08-05 ENCOUNTER — ORDERS ONLY (AUTO-RELEASED) (OUTPATIENT)
Dept: CARDIOLOGY | Facility: CLINIC | Age: 60
End: 2025-08-05

## 2025-08-05 ENCOUNTER — OFFICE VISIT (OUTPATIENT)
Dept: CARDIOLOGY | Facility: CLINIC | Age: 60
End: 2025-08-05
Attending: NURSE PRACTITIONER
Payer: COMMERCIAL

## 2025-08-05 VITALS
BODY MASS INDEX: 30.24 KG/M2 | OXYGEN SATURATION: 99 % | WEIGHT: 216 LBS | HEART RATE: 67 BPM | HEIGHT: 71 IN | RESPIRATION RATE: 20 BRPM | SYSTOLIC BLOOD PRESSURE: 124 MMHG | DIASTOLIC BLOOD PRESSURE: 82 MMHG

## 2025-08-05 DIAGNOSIS — I48.0 PAROXYSMAL ATRIAL FIBRILLATION (H): Primary | ICD-10-CM

## 2025-08-05 DIAGNOSIS — R06.00 DYSPNEA, UNSPECIFIED TYPE: ICD-10-CM

## 2025-08-05 DIAGNOSIS — I10 BENIGN ESSENTIAL HYPERTENSION: ICD-10-CM

## 2025-08-05 DIAGNOSIS — I48.0 PAROXYSMAL ATRIAL FIBRILLATION (H): ICD-10-CM

## 2025-08-05 DIAGNOSIS — Z86.79 HISTORY OF CARDIOMYOPATHY: ICD-10-CM

## 2025-08-05 PROBLEM — I50.9 CHF (CONGESTIVE HEART FAILURE) (H): Status: RESOLVED | Noted: 2024-03-08 | Resolved: 2025-08-05

## 2025-08-05 PROCEDURE — 3079F DIAST BP 80-89 MM HG: CPT

## 2025-08-05 PROCEDURE — 93000 ELECTROCARDIOGRAM COMPLETE: CPT

## 2025-08-05 PROCEDURE — 1126F AMNT PAIN NOTED NONE PRSNT: CPT

## 2025-08-05 PROCEDURE — 3074F SYST BP LT 130 MM HG: CPT

## 2025-08-05 PROCEDURE — 99214 OFFICE O/P EST MOD 30 MIN: CPT

## 2025-08-05 ASSESSMENT — PAIN SCALES - GENERAL: PAINLEVEL_OUTOF10: NO PAIN (0)

## 2025-08-26 LAB — CV ZIO PRELIM RESULTS: NORMAL

## (undated) DEVICE — KIT ENDO TURNOVER/PROCEDURE CARRY-ON 101822

## (undated) DEVICE — TUBING SUCTION 12"X1/4" N612

## (undated) DEVICE — SOL WATER IRRIG 1000ML BOTTLE 07139-09

## (undated) DEVICE — GLOVE EXAM NITRILE LG

## (undated) DEVICE — LUBRICATING JELLY 4.25OZ

## (undated) RX ORDER — GLYCOPYRROLATE 0.2 MG/ML
INJECTION, SOLUTION INTRAMUSCULAR; INTRAVENOUS
Status: DISPENSED
Start: 2023-09-14

## (undated) RX ORDER — PROPOFOL 10 MG/ML
INJECTION, EMULSION INTRAVENOUS
Status: DISPENSED
Start: 2023-02-09

## (undated) RX ORDER — LIDOCAINE HYDROCHLORIDE 10 MG/ML
INJECTION, SOLUTION EPIDURAL; INFILTRATION; INTRACAUDAL; PERINEURAL
Status: DISPENSED
Start: 2023-02-09

## (undated) RX ORDER — FENTANYL CITRATE 50 UG/ML
INJECTION, SOLUTION INTRAMUSCULAR; INTRAVENOUS
Status: DISPENSED
Start: 2023-09-14

## (undated) RX ORDER — NALOXONE HYDROCHLORIDE 0.4 MG/ML
INJECTION, SOLUTION INTRAMUSCULAR; INTRAVENOUS; SUBCUTANEOUS
Status: DISPENSED
Start: 2023-09-14

## (undated) RX ORDER — FLUMAZENIL 0.1 MG/ML
INJECTION, SOLUTION INTRAVENOUS
Status: DISPENSED
Start: 2023-09-14

## (undated) RX ORDER — LIDOCAINE HYDROCHLORIDE 40 MG/ML
SOLUTION TOPICAL
Status: DISPENSED
Start: 2023-09-14